# Patient Record
Sex: FEMALE | Race: WHITE | NOT HISPANIC OR LATINO | Employment: OTHER | URBAN - NONMETROPOLITAN AREA
[De-identification: names, ages, dates, MRNs, and addresses within clinical notes are randomized per-mention and may not be internally consistent; named-entity substitution may affect disease eponyms.]

---

## 2017-02-15 ENCOUNTER — OFFICE VISIT - GICH (OUTPATIENT)
Dept: FAMILY MEDICINE | Facility: OTHER | Age: 67
End: 2017-02-15

## 2017-02-15 ENCOUNTER — HISTORY (OUTPATIENT)
Dept: FAMILY MEDICINE | Facility: OTHER | Age: 67
End: 2017-02-15

## 2017-02-15 DIAGNOSIS — R09.81 NASAL CONGESTION: ICD-10-CM

## 2017-02-15 DIAGNOSIS — J20.9 ACUTE BRONCHITIS: ICD-10-CM

## 2018-01-03 NOTE — PROGRESS NOTES
"Patient Information     Patient Name MRN Sex Natalee Urban [id number] Female 1950      Progress Notes by Linda Pedroza NP at 2/15/2017  3:45 PM     Author:  Linda Pedroza NP Service:  (none) Author Type:  PHYS- Nurse Practitioner     Filed:  2/15/2017  8:08 PM Encounter Date:  2/15/2017 Status:  Signed     :  Linda Pedroza NP (PHYS- Nurse Practitioner)            HPI:    Natalee Montalvo is a 66 y.o. female who presents to clinic today for URI.  Cough x 2 weeks.  Chest tightness initially, loosened up after taking Mucinex.  Rarely short of breath with coughing spells/fits.  Nasal sinus congestion and drainage ongoing for 2 weeks.  Lots of nasal phlegm.   Sinus pressure with hard coughing.  No fevers.  No sore throat.  Appetite normal.  Energy normal.  States she normally goes to [facility] but she was unable to get into see her PCP until .  States she was treated about 2 months ago with Z jarrod which worked well.  No flu shot.  Current daily smoker, 0.5 ppd            No past medical history on file.  No past surgical history on file.  Social History        Substance Use Topics          Smoking status:   Current Every Day Smoker      Packs/day:  0.50      Types:  Cigarettes      Smokeless tobacco:   Never Used      Alcohol use   Yes      Comment: moderate       Current Outpatient Prescriptions       Medication  Sig Dispense Refill     atorvastatin (LIPITOR) 80 mg tablet        hydroCHLOROthiazide (HCTZ) 25 mg tablet        LORazepam (ATIVAN) 0.5 mg tab        No current facility-administered medications for this visit.      Medications have been reviewed by me and are current to the best of my knowledge and ability.    No Known Allergies    ROS:  Refer to HPI    Visit Vitals       /80     Pulse 80     Temp 99.6  F (37.6  C) (Tympanic)     Ht 1.61 m (5' 3.39\")     Wt 64.3 kg (141 lb 12.8 oz)     Breastfeeding No     BMI 24.81 kg/m2       EXAM:  General Appearance: Well " appearing adult female, appropriate appearance for age. No acute distress  Head: normocephalic, atraumatic  Ears: Left TM with bony landmarks appreciated, no erythema, no effusion, no bulging, no purulence.  Right TM with bony landmarks appreciated, no erythema, no effusion, no bulging, no purulence.   Left auditory canal clear.  Right auditory canal clear.  Normal external ears, non tender.  Eyes: conjunctivae normal, no drainage  Orophayrnx: moist mucous membranes, posterior pharynx without erythema, tonsils without hypertrophy, no erythema, no exudates or petechiae, no post nasal drip seen.    No sinus pain upon palpation of the frontal, maxillary, or ethmoid sinuses  Nose:  Bilateral nares with erythema, edema, and congestion   Neck: supple without adenopathy  Respiratory: normal chest wall and respirations.  Normal effort.  Clear to auscultation bilaterally, no wheezes or rhonchi or congestion, congested cough appreciated  Cardiac: RRR with no murmurs  Psychological: normal affect, alert and pleasant    ASSESSMENT/PLAN:    ICD-10-CM    1. Bronchitis, acute, with bronchospasm J20.9 azithromycin (ZITHROMAX Z-HOLLY) 250 mg tablet      predniSONE (DELTASONE) 20 mg tablet      albuterol HFA 90 mcg/actuation inhaler   2. Nasal sinus congestion R09.81 azithromycin (ZITHROMAX Z-HOLLY) 250 mg tablet         Azithromycin daily x 5 days (z holly dosing)  Prednisone 20 mg daily x 3 days, take with food  Albuterol inhaler 2 puffs Q 4 hours PRN  Mucinex PRN  Encouraged fluids  Symptomatic treatment - salt water gargles, honey, elevation, humidifier, sinus rinse/netti pot, lozenges, etc   Tylenol or ibuprofen PRN  Follow up if symptoms persist or worsen or concerns        Patient Instructions   Z holly - daily x 5 days     Prednisone daily x 3 days     Albuterol inhaler 2 puffs every 4 hours as needed      Encouraged fluids and rest.    May use symptomatic care with tylenol or ibuprofen.     Using a humidifier works well to break up  the congestion.     Elevate the mattress to 15 degrees in order to help with the congestion.    Frequent swallows of cool liquid.      Oatmeal or honey coats the throat and some patients find it soothes the pain.     Please call clinic with any questions or concerns.     Return to clinic with change/worsening of symptoms or concerns.

## 2018-01-03 NOTE — NURSING NOTE
Patient Information     Patient Name MRN Natalee Sims [id number] Female 1950      Nursing Note by Lynette Saba at 2/15/2017  3:45 PM     Author:  Lynette Saba Service:  (none) Author Type:  NURS- Student Practical Nurse     Filed:  2/15/2017  3:48 PM Encounter Date:  2/15/2017 Status:  Signed     :  Lynette Saba (NURS- Student Practical Nurse)            Patient presents with congestion, nasal discharge/yellow, cough. Patient uses honey. These symptoms have been 2 weeks. Two months ago was seen in [place] and given zpak. Patient will bring living will in.  Lynette Saba LPN .............2/15/2017  3:41 PM

## 2018-01-03 NOTE — PATIENT INSTRUCTIONS
Patient Information     Patient Name MRN Natalee Sims [id number] Female 1950      Patient Instructions by Linda Pedroza NP at 2/15/2017  3:45 PM     Author:  Linda Pedroza NP  Service:  (none) Author Type:  PHYS- Nurse Practitioner     Filed:  2/15/2017  8:07 PM  Encounter Date:  2/15/2017 Status:  Addendum     :  Linda Pedroza NP (PHYS- Nurse Practitioner)        Related Notes: Original Note by Linda Pedroza NP (PHYS- Nurse Practitioner) filed at 2/15/2017  4:06 PM            Z jarrod - daily x 5 days     Prednisone daily x 3 days     Albuterol inhaler 2 puffs every 4 hours as needed      Encouraged fluids and rest.    May use symptomatic care with tylenol or ibuprofen.     Using a humidifier works well to break up the congestion.     Elevate the mattress to 15 degrees in order to help with the congestion.    Frequent swallows of cool liquid.      Oatmeal or honey coats the throat and some patients find it soothes the pain.     Please call clinic with any questions or concerns.     Return to clinic with change/worsening of symptoms or concerns.

## 2018-01-26 VITALS
WEIGHT: 141.8 LBS | BODY MASS INDEX: 25.12 KG/M2 | HEIGHT: 63 IN | DIASTOLIC BLOOD PRESSURE: 80 MMHG | SYSTOLIC BLOOD PRESSURE: 118 MMHG | TEMPERATURE: 99.6 F | HEART RATE: 80 BPM

## 2018-03-09 ENCOUNTER — DOCUMENTATION ONLY (OUTPATIENT)
Dept: FAMILY MEDICINE | Facility: OTHER | Age: 68
End: 2018-03-09

## 2018-03-09 RX ORDER — LORAZEPAM 0.5 MG/1
TABLET ORAL
Status: ON HOLD | COMMUNITY
Start: 2016-12-01 | End: 2019-04-02

## 2018-03-09 RX ORDER — ALBUTEROL SULFATE 90 UG/1
2 AEROSOL, METERED RESPIRATORY (INHALATION) EVERY 4 HOURS PRN
Status: ON HOLD | COMMUNITY
Start: 2017-02-15 | End: 2024-01-18

## 2018-03-09 RX ORDER — HYDROCHLOROTHIAZIDE 25 MG/1
25 TABLET ORAL DAILY
Status: ON HOLD | COMMUNITY
Start: 2019-02-15 | End: 2019-04-02

## 2018-03-09 RX ORDER — ATORVASTATIN CALCIUM 80 MG/1
80 TABLET, FILM COATED ORAL DAILY
COMMUNITY
Start: 2017-01-28

## 2018-05-11 ENCOUNTER — TRANSFERRED RECORDS (OUTPATIENT)
Dept: HEALTH INFORMATION MANAGEMENT | Facility: OTHER | Age: 68
End: 2018-05-11

## 2019-04-01 ENCOUNTER — APPOINTMENT (OUTPATIENT)
Dept: CT IMAGING | Facility: OTHER | Age: 69
DRG: 871 | End: 2019-04-01
Attending: FAMILY MEDICINE
Payer: MEDICARE

## 2019-04-01 ENCOUNTER — HOSPITAL ENCOUNTER (INPATIENT)
Facility: OTHER | Age: 69
LOS: 5 days | Discharge: NURSING, REHAB OR LONG-TERM CARE | DRG: 871 | End: 2019-04-06
Attending: PHYSICIAN ASSISTANT | Admitting: INTERNAL MEDICINE
Payer: MEDICARE

## 2019-04-01 ENCOUNTER — APPOINTMENT (OUTPATIENT)
Dept: GENERAL RADIOLOGY | Facility: OTHER | Age: 69
DRG: 871 | End: 2019-04-01
Attending: FAMILY MEDICINE
Payer: MEDICARE

## 2019-04-01 DIAGNOSIS — E87.1 HYPOSMOLALITY SYNDROME: ICD-10-CM

## 2019-04-01 DIAGNOSIS — J44.1 CHRONIC OBSTRUCTIVE PULMONARY DISEASE WITH ACUTE EXACERBATION (H): Primary | ICD-10-CM

## 2019-04-01 DIAGNOSIS — F10.29 ALCOHOL DEPENDENCE WITH ALCOHOL-INDUCED DISORDER (H): ICD-10-CM

## 2019-04-01 DIAGNOSIS — E87.1 HYPONATREMIA: ICD-10-CM

## 2019-04-01 DIAGNOSIS — F17.210 CIGARETTE SMOKER: ICD-10-CM

## 2019-04-01 DIAGNOSIS — F10.29 ALCOHOL DEPENDENCE WITH UNSPECIFIED ALCOHOL-INDUCED DISORDER (H): ICD-10-CM

## 2019-04-01 DIAGNOSIS — E87.6 HYPOKALEMIA: ICD-10-CM

## 2019-04-01 DIAGNOSIS — E87.3 ALKALOSIS: ICD-10-CM

## 2019-04-01 DIAGNOSIS — E87.3 METABOLIC ALKALOSIS: ICD-10-CM

## 2019-04-01 DIAGNOSIS — E86.1 HYPOTENSION DUE TO HYPOVOLEMIA: ICD-10-CM

## 2019-04-01 DIAGNOSIS — E86.1 HYPOVOLEMIA: ICD-10-CM

## 2019-04-01 PROBLEM — R57.1 HYPOVOLEMIC SHOCK (H): Status: ACTIVE | Noted: 2019-04-01

## 2019-04-01 PROBLEM — S22.41XA CLOSED FRACTURE OF MULTIPLE RIBS OF RIGHT SIDE: Status: ACTIVE | Noted: 2019-04-01

## 2019-04-01 PROBLEM — R94.31 PROLONGED Q-T INTERVAL ON ECG: Status: ACTIVE | Noted: 2019-04-01

## 2019-04-01 LAB
ALBUMIN SERPL-MCNC: 3.5 G/DL (ref 3.5–5.7)
ALBUMIN UR-MCNC: NEGATIVE MG/DL
ALP SERPL-CCNC: 102 U/L (ref 34–104)
ALT SERPL W P-5'-P-CCNC: 35 U/L (ref 7–52)
ANION GAP SERPL CALCULATED.3IONS-SCNC: 21 MMOL/L (ref 3–14)
APPEARANCE UR: CLEAR
AST SERPL W P-5'-P-CCNC: 65 U/L (ref 13–39)
BASOPHILS # BLD AUTO: 0 10E9/L (ref 0–0.2)
BASOPHILS NFR BLD AUTO: 0.4 %
BILIRUB SERPL-MCNC: 0.6 MG/DL (ref 0.3–1)
BILIRUB UR QL STRIP: NEGATIVE
BUN SERPL-MCNC: 6 MG/DL (ref 7–25)
CALCIUM SERPL-MCNC: 8.6 MG/DL (ref 8.6–10.3)
CHLORIDE SERPL-SCNC: 80 MMOL/L (ref 98–107)
CK SERPL-CCNC: 234 U/L (ref 30–223)
CO2 SERPL-SCNC: 20 MMOL/L (ref 21–31)
COLOR UR AUTO: YELLOW
CREAT SERPL-MCNC: 0.56 MG/DL (ref 0.6–1.2)
D DIMER PPP DDU-MCNC: 2992 NG/ML D-DU (ref 0–230)
DIFFERENTIAL METHOD BLD: ABNORMAL
EOSINOPHIL # BLD AUTO: 0 10E9/L (ref 0–0.7)
EOSINOPHIL NFR BLD AUTO: 0.3 %
ERYTHROCYTE [DISTWIDTH] IN BLOOD BY AUTOMATED COUNT: 13.2 % (ref 10–15)
ETHANOL SERPL-MCNC: 0.04 %
GFR SERPL CREATININE-BSD FRML MDRD: >90 ML/MIN/{1.73_M2}
GLUCOSE SERPL-MCNC: 149 MG/DL (ref 70–105)
GLUCOSE UR STRIP-MCNC: NEGATIVE MG/DL
HBA1C MFR BLD: 5.4 % (ref 4–6)
HCO3 BLD-SCNC: 24 MMOL/L (ref 22–28)
HCT VFR BLD AUTO: 32.3 % (ref 35–47)
HGB BLD-MCNC: 11.9 G/DL (ref 11.7–15.7)
HGB UR QL STRIP: NEGATIVE
IMM GRANULOCYTES # BLD: 0.1 10E9/L (ref 0–0.4)
IMM GRANULOCYTES NFR BLD: 1 %
KETONES UR STRIP-MCNC: 15 MG/DL
LACTATE SERPL-SCNC: 1.1 MMOL/L (ref 0.5–2.2)
LEUKOCYTE ESTERASE UR QL STRIP: NEGATIVE
LYMPHOCYTES # BLD AUTO: 0.5 10E9/L (ref 0.8–5.3)
LYMPHOCYTES NFR BLD AUTO: 7 %
MAGNESIUM SERPL-MCNC: 1.3 MG/DL (ref 1.9–2.7)
MCH RBC QN AUTO: 35 PG (ref 26.5–33)
MCHC RBC AUTO-ENTMCNC: 36.8 G/DL (ref 31.5–36.5)
MCV RBC AUTO: 95 FL (ref 78–100)
MONOCYTES # BLD AUTO: 0.5 10E9/L (ref 0–1.3)
MONOCYTES NFR BLD AUTO: 8.1 %
NEUTROPHILS # BLD AUTO: 5.6 10E9/L (ref 1.6–8.3)
NEUTROPHILS NFR BLD AUTO: 83.2 %
NITRATE UR QL: NEGATIVE
NT-PROBNP SERPL-MCNC: 260 PG/ML (ref 0–100)
O2/TOTAL GAS SETTING VFR VENT: 0 %
OXYHGB MFR BLD: 90 %
PCO2 BLD: 33 MM HG (ref 35–45)
PH BLD: 7.47 PH (ref 7.35–7.45)
PH UR STRIP: 5.5 PH (ref 5–9)
PLATELET # BLD AUTO: 219 10E9/L (ref 150–450)
PO2 BLD: 73 MM HG (ref 83–108)
POTASSIUM SERPL-SCNC: 2.5 MMOL/L (ref 3.5–5.1)
POTASSIUM SERPL-SCNC: 2.7 MMOL/L (ref 3.5–5.1)
PROT SERPL-MCNC: 6.4 G/DL (ref 6.4–8.9)
RBC # BLD AUTO: 3.4 10E12/L (ref 3.8–5.2)
SODIUM SERPL-SCNC: 121 MMOL/L (ref 134–144)
SOURCE: ABNORMAL
SP GR UR STRIP: 1.01 (ref 1–1.03)
TROPONIN I SERPL-MCNC: <0.03 UG/L (ref 0–0.03)
TROPONIN I SERPL-MCNC: <0.03 UG/L (ref 0–0.03)
UROBILINOGEN UR STRIP-ACNC: 0.2 EU/DL (ref 0.2–1)
WBC # BLD AUTO: 6.7 10E9/L (ref 4–11)

## 2019-04-01 PROCEDURE — 84484 ASSAY OF TROPONIN QUANT: CPT | Performed by: FAMILY MEDICINE

## 2019-04-01 PROCEDURE — 71260 CT THORAX DX C+: CPT

## 2019-04-01 PROCEDURE — 72125 CT NECK SPINE W/O DYE: CPT

## 2019-04-01 PROCEDURE — 96365 THER/PROPH/DIAG IV INF INIT: CPT | Performed by: FAMILY MEDICINE

## 2019-04-01 PROCEDURE — 85379 FIBRIN DEGRADATION QUANT: CPT | Performed by: FAMILY MEDICINE

## 2019-04-01 PROCEDURE — 20000006 ZZH R&B ICU - GICH

## 2019-04-01 PROCEDURE — 25000132 ZZH RX MED GY IP 250 OP 250 PS 637: Mod: GY | Performed by: FAMILY MEDICINE

## 2019-04-01 PROCEDURE — 99223 1ST HOSP IP/OBS HIGH 75: CPT | Mod: AI | Performed by: INTERNAL MEDICINE

## 2019-04-01 PROCEDURE — 87040 BLOOD CULTURE FOR BACTERIA: CPT | Performed by: INTERNAL MEDICINE

## 2019-04-01 PROCEDURE — 84132 ASSAY OF SERUM POTASSIUM: CPT | Performed by: INTERNAL MEDICINE

## 2019-04-01 PROCEDURE — 83735 ASSAY OF MAGNESIUM: CPT | Performed by: FAMILY MEDICINE

## 2019-04-01 PROCEDURE — 25000128 H RX IP 250 OP 636: Performed by: FAMILY MEDICINE

## 2019-04-01 PROCEDURE — 25000132 ZZH RX MED GY IP 250 OP 250 PS 637: Mod: GY | Performed by: INTERNAL MEDICINE

## 2019-04-01 PROCEDURE — 83605 ASSAY OF LACTIC ACID: CPT | Performed by: FAMILY MEDICINE

## 2019-04-01 PROCEDURE — 99285 EMERGENCY DEPT VISIT HI MDM: CPT | Mod: Z6 | Performed by: FAMILY MEDICINE

## 2019-04-01 PROCEDURE — 80320 DRUG SCREEN QUANTALCOHOLS: CPT | Performed by: FAMILY MEDICINE

## 2019-04-01 PROCEDURE — 85025 COMPLETE CBC W/AUTO DIFF WBC: CPT | Performed by: FAMILY MEDICINE

## 2019-04-01 PROCEDURE — 84484 ASSAY OF TROPONIN QUANT: CPT | Performed by: INTERNAL MEDICINE

## 2019-04-01 PROCEDURE — 93010 ELECTROCARDIOGRAM REPORT: CPT | Performed by: INTERNAL MEDICINE

## 2019-04-01 PROCEDURE — 36600 WITHDRAWAL OF ARTERIAL BLOOD: CPT | Performed by: FAMILY MEDICINE

## 2019-04-01 PROCEDURE — 83880 ASSAY OF NATRIURETIC PEPTIDE: CPT | Performed by: FAMILY MEDICINE

## 2019-04-01 PROCEDURE — 83735 ASSAY OF MAGNESIUM: CPT | Performed by: INTERNAL MEDICINE

## 2019-04-01 PROCEDURE — 70450 CT HEAD/BRAIN W/O DYE: CPT

## 2019-04-01 PROCEDURE — 25800030 ZZH RX IP 258 OP 636: Performed by: FAMILY MEDICINE

## 2019-04-01 PROCEDURE — 25500064 ZZH RX 255 OP 636: Performed by: FAMILY MEDICINE

## 2019-04-01 PROCEDURE — 83036 HEMOGLOBIN GLYCOSYLATED A1C: CPT | Performed by: INTERNAL MEDICINE

## 2019-04-01 PROCEDURE — A9270 NON-COVERED ITEM OR SERVICE: HCPCS | Mod: GY | Performed by: FAMILY MEDICINE

## 2019-04-01 PROCEDURE — A9270 NON-COVERED ITEM OR SERVICE: HCPCS | Mod: GY | Performed by: INTERNAL MEDICINE

## 2019-04-01 PROCEDURE — 25000128 H RX IP 250 OP 636: Performed by: INTERNAL MEDICINE

## 2019-04-01 PROCEDURE — 93005 ELECTROCARDIOGRAM TRACING: CPT | Mod: 76 | Performed by: FAMILY MEDICINE

## 2019-04-01 PROCEDURE — 81003 URINALYSIS AUTO W/O SCOPE: CPT | Performed by: INTERNAL MEDICINE

## 2019-04-01 PROCEDURE — 99285 EMERGENCY DEPT VISIT HI MDM: CPT | Mod: 25 | Performed by: FAMILY MEDICINE

## 2019-04-01 PROCEDURE — 93005 ELECTROCARDIOGRAM TRACING: CPT | Performed by: FAMILY MEDICINE

## 2019-04-01 PROCEDURE — 96367 TX/PROPH/DG ADDL SEQ IV INF: CPT | Performed by: FAMILY MEDICINE

## 2019-04-01 PROCEDURE — 36415 COLL VENOUS BLD VENIPUNCTURE: CPT | Performed by: INTERNAL MEDICINE

## 2019-04-01 PROCEDURE — 96361 HYDRATE IV INFUSION ADD-ON: CPT | Performed by: FAMILY MEDICINE

## 2019-04-01 PROCEDURE — 82550 ASSAY OF CK (CPK): CPT | Performed by: INTERNAL MEDICINE

## 2019-04-01 PROCEDURE — 96366 THER/PROPH/DIAG IV INF ADDON: CPT | Performed by: FAMILY MEDICINE

## 2019-04-01 PROCEDURE — 36416 COLLJ CAPILLARY BLOOD SPEC: CPT | Performed by: FAMILY MEDICINE

## 2019-04-01 PROCEDURE — 80053 COMPREHEN METABOLIC PANEL: CPT | Performed by: FAMILY MEDICINE

## 2019-04-01 PROCEDURE — 82805 BLOOD GASES W/O2 SATURATION: CPT | Performed by: FAMILY MEDICINE

## 2019-04-01 PROCEDURE — 71045 X-RAY EXAM CHEST 1 VIEW: CPT

## 2019-04-01 RX ORDER — POTASSIUM CHLORIDE 7.45 MG/ML
10 INJECTION INTRAVENOUS CONTINUOUS
Status: DISCONTINUED | OUTPATIENT
Start: 2019-04-01 | End: 2019-04-06 | Stop reason: HOSPADM

## 2019-04-01 RX ORDER — PROCHLORPERAZINE 25 MG
12.5 SUPPOSITORY, RECTAL RECTAL EVERY 12 HOURS PRN
Status: DISCONTINUED | OUTPATIENT
Start: 2019-04-01 | End: 2019-04-06 | Stop reason: HOSPADM

## 2019-04-01 RX ORDER — SODIUM CHLORIDE 9 MG/ML
INJECTION, SOLUTION INTRAVENOUS CONTINUOUS
Status: DISCONTINUED | OUTPATIENT
Start: 2019-04-01 | End: 2019-04-03

## 2019-04-01 RX ORDER — ASPIRIN 81 MG/1
81 TABLET, CHEWABLE ORAL DAILY
Status: DISCONTINUED | OUTPATIENT
Start: 2019-04-02 | End: 2019-04-06 | Stop reason: HOSPADM

## 2019-04-01 RX ORDER — METOCLOPRAMIDE 5 MG/1
5 TABLET ORAL EVERY 6 HOURS PRN
Status: DISCONTINUED | OUTPATIENT
Start: 2019-04-01 | End: 2019-04-06 | Stop reason: HOSPADM

## 2019-04-01 RX ORDER — DOPAMINE HYDROCHLORIDE 160 MG/100ML
5-20 INJECTION, SOLUTION INTRAVENOUS CONTINUOUS
Status: DISCONTINUED | OUTPATIENT
Start: 2019-04-01 | End: 2019-04-02 | Stop reason: DRUGHIGH

## 2019-04-01 RX ORDER — POTASSIUM CHLORIDE 20MEQ/15ML
20 LIQUID (ML) ORAL ONCE
Status: COMPLETED | OUTPATIENT
Start: 2019-04-01 | End: 2019-04-01

## 2019-04-01 RX ORDER — METOCLOPRAMIDE HYDROCHLORIDE 5 MG/ML
5 INJECTION INTRAMUSCULAR; INTRAVENOUS EVERY 6 HOURS PRN
Status: DISCONTINUED | OUTPATIENT
Start: 2019-04-01 | End: 2019-04-06 | Stop reason: HOSPADM

## 2019-04-01 RX ORDER — CALCIUM CARBONATE 500 MG/1
1000 TABLET, CHEWABLE ORAL 4 TIMES DAILY PRN
Status: DISCONTINUED | OUTPATIENT
Start: 2019-04-01 | End: 2019-04-06 | Stop reason: HOSPADM

## 2019-04-01 RX ORDER — ONDANSETRON 2 MG/ML
4 INJECTION INTRAMUSCULAR; INTRAVENOUS EVERY 6 HOURS PRN
Status: DISCONTINUED | OUTPATIENT
Start: 2019-04-01 | End: 2019-04-06 | Stop reason: HOSPADM

## 2019-04-01 RX ORDER — AMOXICILLIN 250 MG
1 CAPSULE ORAL 2 TIMES DAILY PRN
Status: DISCONTINUED | OUTPATIENT
Start: 2019-04-01 | End: 2019-04-06 | Stop reason: HOSPADM

## 2019-04-01 RX ORDER — POTASSIUM CHLORIDE 7.45 MG/ML
10 INJECTION INTRAVENOUS
Status: DISCONTINUED | OUTPATIENT
Start: 2019-04-01 | End: 2019-04-06 | Stop reason: HOSPADM

## 2019-04-01 RX ORDER — SODIUM CHLORIDE 9 MG/ML
INJECTION, SOLUTION INTRAVENOUS CONTINUOUS
Status: DISCONTINUED | OUTPATIENT
Start: 2019-04-01 | End: 2019-04-02

## 2019-04-01 RX ORDER — AMOXICILLIN 250 MG
2 CAPSULE ORAL 2 TIMES DAILY PRN
Status: DISCONTINUED | OUTPATIENT
Start: 2019-04-01 | End: 2019-04-06 | Stop reason: HOSPADM

## 2019-04-01 RX ORDER — MORPHINE SULFATE 2 MG/ML
2 INJECTION, SOLUTION INTRAMUSCULAR; INTRAVENOUS
Status: DISCONTINUED | OUTPATIENT
Start: 2019-04-01 | End: 2019-04-06 | Stop reason: HOSPADM

## 2019-04-01 RX ORDER — POTASSIUM CHLORIDE 1500 MG/1
20-40 TABLET, EXTENDED RELEASE ORAL
Status: DISCONTINUED | OUTPATIENT
Start: 2019-04-01 | End: 2019-04-06 | Stop reason: HOSPADM

## 2019-04-01 RX ORDER — LIDOCAINE 40 MG/G
CREAM TOPICAL
Status: DISCONTINUED | OUTPATIENT
Start: 2019-04-01 | End: 2019-04-06 | Stop reason: HOSPADM

## 2019-04-01 RX ORDER — PROCHLORPERAZINE MALEATE 5 MG
5 TABLET ORAL EVERY 6 HOURS PRN
Status: DISCONTINUED | OUTPATIENT
Start: 2019-04-01 | End: 2019-04-06 | Stop reason: HOSPADM

## 2019-04-01 RX ORDER — ONDANSETRON 4 MG/1
4 TABLET, ORALLY DISINTEGRATING ORAL EVERY 6 HOURS PRN
Status: DISCONTINUED | OUTPATIENT
Start: 2019-04-01 | End: 2019-04-06 | Stop reason: HOSPADM

## 2019-04-01 RX ORDER — MAGNESIUM SULFATE HEPTAHYDRATE 40 MG/ML
2 INJECTION, SOLUTION INTRAVENOUS ONCE
Status: COMPLETED | OUTPATIENT
Start: 2019-04-01 | End: 2019-04-01

## 2019-04-01 RX ORDER — DEXTROSE MONOHYDRATE 25 G/50ML
25-50 INJECTION, SOLUTION INTRAVENOUS
Status: DISCONTINUED | OUTPATIENT
Start: 2019-04-01 | End: 2019-04-06 | Stop reason: HOSPADM

## 2019-04-01 RX ORDER — MAGNESIUM SULFATE HEPTAHYDRATE 40 MG/ML
4 INJECTION, SOLUTION INTRAVENOUS EVERY 4 HOURS PRN
Status: DISCONTINUED | OUTPATIENT
Start: 2019-04-01 | End: 2019-04-06 | Stop reason: HOSPADM

## 2019-04-01 RX ORDER — ATORVASTATIN CALCIUM 40 MG/1
80 TABLET, FILM COATED ORAL DAILY
Status: DISCONTINUED | OUTPATIENT
Start: 2019-04-02 | End: 2019-04-06 | Stop reason: HOSPADM

## 2019-04-01 RX ORDER — ACETAMINOPHEN 325 MG/1
650 TABLET ORAL EVERY 4 HOURS PRN
Status: DISCONTINUED | OUTPATIENT
Start: 2019-04-01 | End: 2019-04-06 | Stop reason: HOSPADM

## 2019-04-01 RX ORDER — MAGNESIUM CARB/ALUMINUM HYDROX 105-160MG
148 TABLET,CHEWABLE ORAL
Status: DISCONTINUED | OUTPATIENT
Start: 2019-04-01 | End: 2019-04-06 | Stop reason: HOSPADM

## 2019-04-01 RX ORDER — ACETAMINOPHEN 650 MG/1
650 SUPPOSITORY RECTAL EVERY 4 HOURS PRN
Status: DISCONTINUED | OUTPATIENT
Start: 2019-04-01 | End: 2019-04-06 | Stop reason: HOSPADM

## 2019-04-01 RX ORDER — NICOTINE POLACRILEX 4 MG
15-30 LOZENGE BUCCAL
Status: DISCONTINUED | OUTPATIENT
Start: 2019-04-01 | End: 2019-04-06 | Stop reason: HOSPADM

## 2019-04-01 RX ORDER — NALOXONE HYDROCHLORIDE 0.4 MG/ML
.1-.4 INJECTION, SOLUTION INTRAMUSCULAR; INTRAVENOUS; SUBCUTANEOUS
Status: DISCONTINUED | OUTPATIENT
Start: 2019-04-01 | End: 2019-04-06 | Stop reason: HOSPADM

## 2019-04-01 RX ORDER — POLYETHYLENE GLYCOL 3350 17 G/17G
17 POWDER, FOR SOLUTION ORAL DAILY PRN
Status: DISCONTINUED | OUTPATIENT
Start: 2019-04-01 | End: 2019-04-06 | Stop reason: HOSPADM

## 2019-04-01 RX ORDER — ASPIRIN 81 MG/1
81 TABLET, CHEWABLE ORAL DAILY
Status: ON HOLD | COMMUNITY
End: 2024-02-19

## 2019-04-01 RX ADMIN — SODIUM CHLORIDE 1000 ML: 9 INJECTION, SOLUTION INTRAVENOUS at 19:28

## 2019-04-01 RX ADMIN — IOHEXOL 100 ML: 350 INJECTION, SOLUTION INTRAVENOUS at 18:46

## 2019-04-01 RX ADMIN — DEXTROSE MONOHYDRATE AND SODIUM CHLORIDE 125 ML/HR: 5; .9 INJECTION, SOLUTION INTRAVENOUS at 18:19

## 2019-04-01 RX ADMIN — SODIUM CHLORIDE 500 ML: 9 INJECTION, SOLUTION INTRAVENOUS at 17:55

## 2019-04-01 RX ADMIN — SODIUM CHLORIDE 125 ML/HR: 9 INJECTION, SOLUTION INTRAVENOUS at 17:40

## 2019-04-01 RX ADMIN — DOPAMINE HYDROCHLORIDE IN DEXTROSE 5 MCG/KG/MIN: 1.6 INJECTION, SOLUTION INTRAVENOUS at 23:46

## 2019-04-01 RX ADMIN — ACETAMINOPHEN 650 MG: 325 TABLET, FILM COATED ORAL at 21:46

## 2019-04-01 RX ADMIN — MORPHINE SULFATE 2 MG: 2 INJECTION, SOLUTION INTRAMUSCULAR; INTRAVENOUS at 21:46

## 2019-04-01 RX ADMIN — SODIUM CHLORIDE 500 ML: 9 INJECTION, SOLUTION INTRAVENOUS at 20:39

## 2019-04-01 RX ADMIN — POTASSIUM CHLORIDE 20 MEQ: 40 SOLUTION ORAL at 19:23

## 2019-04-01 RX ADMIN — MAGNESIUM SULFATE HEPTAHYDRATE 2 G: 40 INJECTION, SOLUTION INTRAVENOUS at 20:39

## 2019-04-01 ASSESSMENT — ENCOUNTER SYMPTOMS
UNEXPECTED WEIGHT CHANGE: 0
ABDOMINAL PAIN: 0
PALPITATIONS: 0
PSYCHIATRIC NEGATIVE: 1
ROS SKIN COMMENTS: EASY BRUISABILITY
SHORTNESS OF BREATH: 1
CHEST TIGHTNESS: 0
SPEECH DIFFICULTY: 0
FATIGUE: 1
NECK PAIN: 0
DIAPHORESIS: 0
DIARRHEA: 0
ACTIVITY CHANGE: 1
ABDOMINAL DISTENTION: 0
CHILLS: 0
HEADACHES: 0
APPETITE CHANGE: 0
LIGHT-HEADEDNESS: 1
VOMITING: 0
FEVER: 0
HEMATOLOGIC/LYMPHATIC NEGATIVE: 1
MYALGIAS: 0
WHEEZING: 0
STRIDOR: 0
BACK PAIN: 0
WEAKNESS: 1
APNEA: 0
ALLERGIC/IMMUNOLOGIC NEGATIVE: 1
CONSTIPATION: 0
DIZZINESS: 0

## 2019-04-01 ASSESSMENT — ACTIVITIES OF DAILY LIVING (ADL)
BATHING: 0-->INDEPENDENT
AMBULATION: 0-->INDEPENDENT
COGNITION: 0 - NO COGNITION ISSUES REPORTED
TOILETING: 0-->INDEPENDENT
RETIRED_COMMUNICATION: 0-->UNDERSTANDS/COMMUNICATES WITHOUT DIFFICULTY
TRANSFERRING: 0-->INDEPENDENT
FALL_HISTORY_WITHIN_LAST_SIX_MONTHS: YES
NUMBER_OF_TIMES_PATIENT_HAS_FALLEN_WITHIN_LAST_SIX_MONTHS: 10
SWALLOWING: 0-->SWALLOWS FOODS/LIQUIDS WITHOUT DIFFICULTY
RETIRED_EATING: 0-->INDEPENDENT
DRESS: 0-->INDEPENDENT

## 2019-04-01 ASSESSMENT — MIFFLIN-ST. JEOR
SCORE: 1133.24
SCORE: 1209.13

## 2019-04-01 NOTE — ED TRIAGE NOTES
"Pt to ER from home after falling, sister called EMS after pt had been on floor for approx 15 mins having trouble getting up.  Pt c/o neck pain, c collar applied.  BG for EMS 60, 1 amp D25 given, BG up to 282.  BP 60 systolic on arrival.  Pt reported tired and weak for past weeks.  Pt c/o neck back and right sided rib pain on arrival.  Pt states she has \"been falling all the time\" for past few weeks d/t weakness, dizzy. C/o cough.  Multiple bruises t/o body.  "

## 2019-04-01 NOTE — ED NOTES
Patient has multiple bruises in different stages of healing all over body. Patient reporting frequent falls that correlate to the bruises. Large bruise on left upper back noted upon skin assessment.

## 2019-04-01 NOTE — ED PROVIDER NOTES
History     Chief Complaint   Patient presents with     Fall     Trauma     HPI  Natalee Montalvo is a 68 year old female who  Presents via EMS for fall about two hours ago.Sister called EMS after she was unable to get patient up 15 minutes.  When EMS arrived it was noted that she had SBP of 60 and BS of 60. She was given 1 am of D25 and repeat upon arrival here was 282//. Initial BP was 92 here.  She notes multiple falls last week and chart review shows that she called in because she was concerned that her last BP med was causing the falls.  She complained of neck pain today and collar was applied prior to arrival at the ER. Complains of right rib pain as well. HAs been having difficulty coughing. She did take her meds today including Plavix, Toprol, hydrochlorothiazide  And atorvastatin.      She has a PMH of CAD with 3V CABG in 2013 (SHERRELL-LAD, RSVG-OM1 and RSVG-RCA), anxiety and depression, history of alcohol use disorder, COPD, HTN, and HLD. Also recent admit for NSTEMI to [facility] and they noted severe left subclavian stenosis requiring bypass for the left CCA to the left subclavian artery 1/22/19. She was discharged from [facility] with plans for cardiac rehab which she did not get. She continues to smoke as well and admits to drinking 4 shots of Rum daily. Patient admitted to alcohol intake today as well stating she only had one drinke    Allergies:  Allergies   Allergen Reactions     Augmentin      Other reaction(s): GI intolerance  Stomach cramps     Bupropion      Other reaction(s): Other  Insomnia and tension     Oxycodone      nausea     Pseudoephedrine      Other reaction(s): Increased blood pressure, Tachycardia  Advise not to use this medication which was in her allergy product.     Nicotine Rash     Sertraline Rash       Problem List:    Patient Active Problem List    Diagnosis Date Noted     Hypovolemic shock (H) 04/01/2019     Priority: Medium     CAD (coronary artery disease) 04/01/2019     Priority:  Medium     COPD (chronic obstructive pulmonary disease) (H) 04/01/2019     Priority: Medium     Alcoholism (H) 04/01/2019     Priority: Medium     Hypokalemia 04/01/2019     Priority: Medium     Hyponatremia 04/01/2019     Priority: Medium     Closed fracture of multiple ribs of right side 04/01/2019     Priority: Medium     Prolonged Q-T interval on ECG 04/01/2019     Priority: Medium        Past Medical History:    Past Medical History:   Diagnosis Date     Alcoholism (H) 4/1/2019     CAD (coronary artery disease) 4/1/2019     COPD (chronic obstructive pulmonary disease) (H) 4/1/2019       Past Surgical History:    History reviewed. No pertinent surgical history.    Family History:    History reviewed. No pertinent family history.    Social History:  Marital Status:   [5]  Social History     Tobacco Use     Smoking status: Current Every Day Smoker     Packs/day: 0.50     Types: Cigarettes     Smokeless tobacco: Never Used   Substance Use Topics     Alcohol use: Yes     Comment: daily     Drug use: No     Comment: Drug use: No        Medications:      albuterol (PROAIR HFA/PROVENTIL HFA/VENTOLIN HFA) 108 (90 BASE) MCG/ACT Inhaler   aspirin (ASA) 81 MG chewable tablet   atorvastatin (LIPITOR) 80 MG tablet   hydrochlorothiazide (HYDRODIURIL) 25 MG tablet   LORazepam (ATIVAN) 0.5 MG tablet         Review of Systems   Constitutional: Positive for activity change and fatigue. Negative for appetite change, chills, diaphoresis, fever and unexpected weight change.   HENT: Negative.    Respiratory: Positive for shortness of breath. Negative for apnea, chest tightness, wheezing and stridor.    Cardiovascular: Positive for leg swelling. Negative for chest pain and palpitations.   Gastrointestinal: Negative for abdominal distention, abdominal pain, constipation, diarrhea and vomiting.   Genitourinary: Negative.    Musculoskeletal: Negative for back pain, myalgias and neck pain.   Skin:        Easy bruisability  "  Allergic/Immunologic: Negative.    Neurological: Positive for weakness and light-headedness. Negative for dizziness, speech difficulty and headaches.   Hematological: Negative.    Psychiatric/Behavioral: Negative.        Physical Exam   BP: 97/50  Pulse: 73  Heart Rate: 81  Temp: 98.4  F (36.9  C)  Resp: 22  Height: 160 cm (5' 3\")  Weight: 64.4 kg (142 lb)  SpO2: 94 %      Physical Exam   Constitutional: She is oriented to person, place, and time. She appears well-developed and well-nourished. No distress.   HENT:   Head: Normocephalic.   Right Ear: External ear normal.   Left Ear: External ear normal.   Nose: Nose normal.   Mouth/Throat: Oropharynx is clear and moist.   Multiple bruises   Eyes: Conjunctivae and EOM are normal. Pupils are equal, round, and reactive to light. Right eye exhibits no discharge. Left eye exhibits no discharge.   Neck: Normal range of motion. Neck supple. No thyromegaly present.   Cardiovascular: Normal rate, regular rhythm and normal heart sounds.   Pulmonary/Chest: Effort normal and breath sounds normal. No stridor. No respiratory distress. She has no wheezes.   Chest tenderness on right.    Abdominal: Soft. Bowel sounds are normal. She exhibits no distension. There is no tenderness. There is no guarding.   Musculoskeletal: Normal range of motion. She exhibits no edema, tenderness or deformity.   Lymphadenopathy:     She has no cervical adenopathy.   Neurological: She is alert and oriented to person, place, and time.   Skin: Skin is warm and dry. Capillary refill takes less than 2 seconds. She is not diaphoretic.   Psychiatric: She has a normal mood and affect. Her behavior is normal. Judgment and thought content normal.   Nursing note and vitals reviewed.      ED Course   Patient seen and examined. Labs and EKG ordered. CXR ordered.     Labs reviewed as they came in, but were incidentally deleted from the ED course. Patient's sodium noted to be 121 potassium 2.5 with an anion gap of " 21, creatinine 0.56 pH was 7.47 with a PCO2 of 33 and a PO2 of 73.  O2 sats are 90%.    Discussed results with patient she has been more short of breath with any kind of activity as well as lightheadedness dizziness.  She has had multiple falls with bruising all over her body but also states that she has been bruising very easily with minimal contact.    D-dimer came back elevated at 2992.  CT PE chest ordered.  Ethanol is also elevated at 0.04 and BNP is mildly elevated at 260.  Troponin was negative and lactate was also negative.    CT PE study is negative. Discussed with Dr Saldivar=- She does have 4 broken ribs, but no [pneumothorax noted. She does have a cough that is congested. Potassium - is low and she has been given one dose of potassium IV and now that she is more awake will give her oral potassium as well. Second troponin is ordered. Second EKG done as well.     Second EKG has improved but QTC is more prolonged. BP has dropped again. Dr Saldivar has evaluated. MAP has dropped to 58 - will start Dopamine for pressor support.     7:37 PM Phone call to [facility].Discussed with Dr Weathers. Patient's pressure is finally coming back up into the systolic of 90's/ Not on pressors yet. Dr Saldivar is comfortable with admission here now that pressures are coming up. Patient is on her third liter of normal saline at present and has received 30cc/kg of fluids. She is acting more normally. BP's were taken with patient lying flat, had previously been sitting up. Repeat BP in right arm is the same as the left. Patient had subclavian stenosis and repair on the right.   Procedures               EKG Interpretation:      Interpreted by Isis Gonzalez  Time reviewed: 17:00   Symptoms at time of EKG: lightheadedness and SOB   Rhythm: normal sinus  and sinus arrhythmia  Rate: Normal  Axis: Normal  Ectopy: none  Conduction: normal  ST Segments/ T Waves: No acute ischemic changes, ST Segment Depression III, T wave inversion  V1 and V2 and QTc prolongation 565 versus 464 <arch 2019  Q Waves: none  Comparison to prior: unable to directly compare but QT is prolonged per report.    Clinical Impression: non-specific EKG and prolonged QT interval         EKG Interpretation:    #2  Interpreted by Isis Gonzalez  Time reviewed:19:25  Symptoms at time of EKG: hypotension   Rhythm: Normal sinus/sinus arrhythmia   Rate: Normal  Axis: Normal  Ectopy: None  Conduction: Normal  ST Segments/ T Waves: Non-specific ST-T wave changes and QTc prolongation -574  Q Waves: None  Comparison to prior: improved ST depression and T wave inversions. QTC is longer.     Clinical Impression: non-specific EKG, hypokalemia and prolonged QT interval    Results for orders placed or performed during the hospital encounter of 04/01/19 (from the past 24 hour(s))   CBC with platelets differential   Result Value Ref Range    WBC 6.7 4.0 - 11.0 10e9/L    RBC Count 3.40 (L) 3.8 - 5.2 10e12/L    Hemoglobin 11.9 11.7 - 15.7 g/dL    Hematocrit 32.3 (L) 35.0 - 47.0 %    MCV 95 78 - 100 fl    MCH 35.0 (H) 26.5 - 33.0 pg    MCHC 36.8 (H) 31.5 - 36.5 g/dL    RDW 13.2 10.0 - 15.0 %    Platelet Count 219 150 - 450 10e9/L    Diff Method Automated Method     % Neutrophils 83.2 %    % Lymphocytes 7.0 %    % Monocytes 8.1 %    % Eosinophils 0.3 %    % Basophils 0.4 %    % Immature Granulocytes 1.0 %    Absolute Neutrophil 5.6 1.6 - 8.3 10e9/L    Absolute Lymphocytes 0.5 (L) 0.8 - 5.3 10e9/L    Absolute Monocytes 0.5 0.0 - 1.3 10e9/L    Absolute Eosinophils 0.0 0.0 - 0.7 10e9/L    Absolute Basophils 0.0 0.0 - 0.2 10e9/L    Abs Immature Granulocytes 0.1 0 - 0.4 10e9/L   Comprehensive metabolic panel   Result Value Ref Range    Sodium 121 (L) 134 - 144 mmol/L    Potassium 2.5 (L) 3.5 - 5.1 mmol/L    Chloride 80 (L) 98 - 107 mmol/L    Carbon Dioxide 20 (L) 21 - 31 mmol/L    Anion Gap 21 (H) 3 - 14 mmol/L    Glucose 149 (H) 70 - 105 mg/dL    Urea Nitrogen 6 (L) 7 - 25 mg/dL    Creatinine 0.56  (L) 0.60 - 1.20 mg/dL    GFR Estimate >90 >60 mL/min/[1.73_m2]    GFR Estimate If Black >90 >60 mL/min/[1.73_m2]    Calcium 8.6 8.6 - 10.3 mg/dL    Bilirubin Total 0.6 0.3 - 1.0 mg/dL    Albumin 3.5 3.5 - 5.7 g/dL    Protein Total 6.4 6.4 - 8.9 g/dL    Alkaline Phosphatase 102 34 - 104 U/L    ALT 35 7 - 52 U/L    AST 65 (H) 13 - 39 U/L   NT pro BNP   Result Value Ref Range    N-Terminal Pro BNP Inpatient 260 (H) 0 - 100 pg/mL   Troponin I   Result Value Ref Range    Troponin I ES <0.030 0.000 - 0.034 ug/L   Ethanol GH   Result Value Ref Range    Ethanol g/dL 0.04 (H) <0.01 %   XR Chest Port 1 View    Narrative    PROCEDURE:  XR CHEST PORT 1 VW    HISTORY: chest tightness -recent NSTEMI. .    COMPARISON:  6/5/07.    FINDINGS:    The cardiomediastinal contours are within normal limits. There is  calcific aortic atherosclerosis. Changes of prior median sternotomy  are seen.  No focal consolidation, effusion or pneumothorax.  Age indeterminant right mid posterior rib fractures are noted.      Impression    IMPRESSION:  Age-indeterminate right rib fractures.      SEEMA ALEXNADRE MD   CT Head w/o Contrast    Narrative    PROCEDURE: CT HEAD W/O CONTRAST     HISTORY: Head trauma, minor, pt on anticoagulation.    COMPARISON: None.    TECHNIQUE:  Helical images of the head from the foramen magnum to the  vertex were obtained without contrast.    FINDINGS: The ventricles and sulci are prominent, compatible with  mild, generalized volume loss. No acute intracranial hemorrhage, mass  effect, midline shift, hydrocephalus or basilar cystern effacement are  present.    The grey-white matter interface is preserved.     The calvarium is intact. An air-fluid level is present in the left  maxillary sinus, which can be seen in acute on chronic sinusitis.      Impression    IMPRESSION: No acute intracranial hemorrhage.      SEEMA ALEXANDRE MD   CT Cervical Spine w/o Contrast    Narrative    PROCEDURE: CT CERVICAL SPINE W/O  CONTRAST     HISTORY: C-spine trauma, ligamentous injury suspected.    TECHNIQUE: Helical noncontrast CT images of the cervical spine.    COMPARISON: None.    FINDINGS:     No acute fracture is identified. The vertebral bodies are normal in  height. The cervical lordosis is straightened. The C1-2 articulation  and the craniocervical junction are intact.     Scattered senescent changes are present. Vascular calcifications are  noted at the carotid bifurcations. Bulky calcific change within the  proximal left subclavian may reflect an occluded stent. Correlate for  evidence of subclavian steal.    Emphysematous changes are present at the lung apices.       Impression    IMPRESSION: No evidence of acute cervical spine fracture.    SEEMA ALEXANDRE MD   D-Dimer (HI,GH)   Result Value Ref Range    D-Dimer ng/mL 2,992 (H) 0 - 230 ng/ml D-DU   Blood gas arterial and oxyhgb   Result Value Ref Range    pH Arterial 7.47 (H) 7.35 - 7.45 pH    pCO2 Arterial 33 (L) 35 - 45 mm Hg    pO2 Arterial 73 (L) 83 - 108 mm Hg    Bicarbonate Arterial 24 22 - 28 mmol/L    FIO2 0     Oxyhemoglobin Arterial 90 (L) >95 %   Lactic acid   Result Value Ref Range    Lactic Acid 1.1 0.5 - 2.2 mmol/L   CT Chest Pulmonary Embolism w Contrast    Narrative    PROCEDURE:  CT CHEST PULMONARY EMBOLISM W CONTRAST.    HISTORY:  Evaluate for pulmonary embolism.  PE suspected, high pretest  prob    TECHNIQUE:  Initial pre-contrast  and localizer images were  obtained.  Contrast enhanced helical CT pulmonary angiography was then  performed.  Routine transaxial and post-processed (multiplanar and/or  MIP) reformations were obtained.    COMPARISON:  Radiograph earlier today.    MEDS/CONTRAST: 100 mL omnipaque 350    PULMONARY CTA FINDINGS:  This is a diagnostic quality helical CT  pulmonary angiogram.  There is no acute pulmonary embolism within the  limitations of mild respiratory motion.    OTHER FINDINGS:      The heart is borderline enlarged.  Coronary calcifications are present.  There is no pericardial thickening or effusions. There is no  mediastinal, hilar, or axillary lymphadenopathy. The thoracic aorta  and main pulmonary artery are within normal limits in size.     Limited views of the upper abdomen reveal no adrenal mass or acute  process. Increased colonic submucosal fat is present.    The pleura is without thickening or effusions. The central airways are  unremarkable. Dependent atelectasis is present.    A chronic right fifth for rib fracture is present. Acute right sixth,  seventh, eighth and ninth rib fractures are present.      Impression    IMPRESSION:    No acute pulmonary emboli.    Acute right sixth, seventh, eighth and ninth rib fractures are  present. Probable atelectasis at the bases from splinting. No  pneumothorax.    SEEMA ALEXANDRE MD   Troponin I   Result Value Ref Range    Troponin I ES <0.030 0.000 - 0.034 ug/L   Magnesium   Result Value Ref Range    Magnesium 1.3 (L) 1.9 - 2.7 mg/dL       Medications   0.9% sodium chloride BOLUS (0 mLs Intravenous Stopped 4/1/19 1740)   sodium chloride 0.9% infusion ( Intravenous Stopped 4/1/19 1819)   potassium chloride 10 mEq in 100 mL sterile water intermittent infusion (premix) ( Intravenous Canceled Entry 4/1/19 1917)   dextrose 5% and 0.9% NaCl infusion (125 mL/hr Intravenous New Bag 4/1/19 1819)   0.9% sodium chloride BOLUS (1,000 mLs Intravenous New Bag 4/1/19 1928)   DOPamine 400 mg in dextrose 5% 250 mL (adult std) - premix (0 mcg/kg/min × 64.4 kg Intravenous Hold 4/1/19 2004)   0.9% sodium chloride BOLUS (0 mLs Intravenous Stopped 4/1/19 1915)   iohexol (OMNIPAQUE) 350 mg/mL solution 100 mL (100 mLs Intravenous Given 4/1/19 1846)   potassium chloride (KAYCIEL) solution 20 mEq (20 mEq Oral Given 4/1/19 1923)       Assessments & Plan (with Medical Decision Making)     I have reviewed the nursing notes.    I have reviewed the findings, diagnosis, plan and need for follow up with  the patient.  8:04 PM  IF her troponin bumps significantly- will consider transfer at that time. Dr Saldivar is comfortable with this plan. Admit ICU.        Medication List      There are no discharge medications for this visit.         Final diagnoses:   Hypotension due to hypovolemia   Alcohol dependence with unspecified alcohol-induced disorder (H)   Hyponatremia   Hypokalemia   Metabolic alkalosis       4/1/2019   [facility] ortega MD  04/01/19 2013

## 2019-04-01 NOTE — ED NOTES
C collar off.  Pt remains lethargic, but opens eyes and speaks appropriately with verbal prompt.  Sister remains at bedside.

## 2019-04-01 NOTE — PROGRESS NOTES
1.  Has the patient had a previous reaction to IV contrast? No      2.  Does the patient have kidney disease? No    3.  Is the patient on dialysis? no    If YES to any of these questions, exam will be reviewed with a Radiologist before administering contrast.

## 2019-04-01 NOTE — ED NOTES
"Sister at bedside, states pt was normal when she found her on floor, just seemed really \"tired\".  "

## 2019-04-02 ENCOUNTER — APPOINTMENT (OUTPATIENT)
Dept: CARDIOLOGY | Facility: OTHER | Age: 69
DRG: 871 | End: 2019-04-02
Attending: INTERNAL MEDICINE
Payer: MEDICARE

## 2019-04-02 LAB
ALBUMIN SERPL-MCNC: 3.3 G/DL (ref 3.5–5.7)
ALP SERPL-CCNC: 90 U/L (ref 34–104)
ALT SERPL W P-5'-P-CCNC: 30 U/L (ref 7–52)
ANION GAP SERPL CALCULATED.3IONS-SCNC: 7 MMOL/L (ref 3–14)
AST SERPL W P-5'-P-CCNC: 50 U/L (ref 13–39)
BILIRUB SERPL-MCNC: 0.5 MG/DL (ref 0.3–1)
BUN SERPL-MCNC: 5 MG/DL (ref 7–25)
CALCIUM SERPL-MCNC: 7.6 MG/DL (ref 8.6–10.3)
CHLORIDE SERPL-SCNC: 93 MMOL/L (ref 98–107)
CO2 SERPL-SCNC: 29 MMOL/L (ref 21–31)
CREAT SERPL-MCNC: 0.51 MG/DL (ref 0.6–1.2)
ERYTHROCYTE [DISTWIDTH] IN BLOOD BY AUTOMATED COUNT: 13.6 % (ref 10–15)
GFR SERPL CREATININE-BSD FRML MDRD: >90 ML/MIN/{1.73_M2}
GLUCOSE SERPL-MCNC: 202 MG/DL (ref 70–105)
HCT VFR BLD AUTO: 30.3 % (ref 35–47)
HGB BLD-MCNC: 10.5 G/DL (ref 11.7–15.7)
MAGNESIUM SERPL-MCNC: 1.8 MG/DL (ref 1.9–2.7)
MCH RBC QN AUTO: 34.3 PG (ref 26.5–33)
MCHC RBC AUTO-ENTMCNC: 34.7 G/DL (ref 31.5–36.5)
MCV RBC AUTO: 99 FL (ref 78–100)
PLATELET # BLD AUTO: 177 10E9/L (ref 150–450)
POTASSIUM SERPL-SCNC: 2.5 MMOL/L (ref 3.5–5.1)
POTASSIUM SERPL-SCNC: 3.4 MMOL/L (ref 3.5–5.1)
PROCALCITONIN SERPL-MCNC: 1.2 NG/ML
PROT SERPL-MCNC: 5.5 G/DL (ref 6.4–8.9)
RBC # BLD AUTO: 3.06 10E12/L (ref 3.8–5.2)
SODIUM SERPL-SCNC: 129 MMOL/L (ref 134–144)
TROPONIN I SERPL-MCNC: 0.04 UG/L (ref 0–0.03)
WBC # BLD AUTO: 5.7 10E9/L (ref 4–11)

## 2019-04-02 PROCEDURE — 40000264 ECHOCARDIOGRAM COMPLETE

## 2019-04-02 PROCEDURE — 25000131 ZZH RX MED GY IP 250 OP 636 PS 637: Mod: GY | Performed by: INTERNAL MEDICINE

## 2019-04-02 PROCEDURE — 93306 TTE W/DOPPLER COMPLETE: CPT | Mod: 26 | Performed by: INTERNAL MEDICINE

## 2019-04-02 PROCEDURE — 25000128 H RX IP 250 OP 636: Performed by: INTERNAL MEDICINE

## 2019-04-02 PROCEDURE — 80053 COMPREHEN METABOLIC PANEL: CPT | Performed by: INTERNAL MEDICINE

## 2019-04-02 PROCEDURE — A9270 NON-COVERED ITEM OR SERVICE: HCPCS | Mod: GY | Performed by: FAMILY MEDICINE

## 2019-04-02 PROCEDURE — 36415 COLL VENOUS BLD VENIPUNCTURE: CPT | Performed by: INTERNAL MEDICINE

## 2019-04-02 PROCEDURE — 25500064 ZZH RX 255 OP 636: Performed by: INTERNAL MEDICINE

## 2019-04-02 PROCEDURE — 84132 ASSAY OF SERUM POTASSIUM: CPT | Performed by: INTERNAL MEDICINE

## 2019-04-02 PROCEDURE — 20000006 ZZH R&B ICU - GICH

## 2019-04-02 PROCEDURE — 99233 SBSQ HOSP IP/OBS HIGH 50: CPT | Performed by: INTERNAL MEDICINE

## 2019-04-02 PROCEDURE — 84484 ASSAY OF TROPONIN QUANT: CPT | Performed by: INTERNAL MEDICINE

## 2019-04-02 PROCEDURE — 25000132 ZZH RX MED GY IP 250 OP 250 PS 637: Mod: GY | Performed by: FAMILY MEDICINE

## 2019-04-02 PROCEDURE — 25800030 ZZH RX IP 258 OP 636: Performed by: FAMILY MEDICINE

## 2019-04-02 PROCEDURE — 85027 COMPLETE CBC AUTOMATED: CPT | Performed by: INTERNAL MEDICINE

## 2019-04-02 PROCEDURE — 25800030 ZZH RX IP 258 OP 636: Performed by: INTERNAL MEDICINE

## 2019-04-02 PROCEDURE — A9270 NON-COVERED ITEM OR SERVICE: HCPCS | Mod: GY | Performed by: INTERNAL MEDICINE

## 2019-04-02 PROCEDURE — 25000132 ZZH RX MED GY IP 250 OP 250 PS 637: Mod: GY | Performed by: INTERNAL MEDICINE

## 2019-04-02 PROCEDURE — 84145 PROCALCITONIN (PCT): CPT | Performed by: INTERNAL MEDICINE

## 2019-04-02 RX ORDER — METOPROLOL SUCCINATE 25 MG/1
25 TABLET, EXTENDED RELEASE ORAL DAILY
COMMUNITY

## 2019-04-02 RX ORDER — FOLIC ACID 1 MG/1
1 TABLET ORAL DAILY
Status: DISCONTINUED | OUTPATIENT
Start: 2019-04-02 | End: 2019-04-06 | Stop reason: HOSPADM

## 2019-04-02 RX ORDER — IPRATROPIUM BROMIDE AND ALBUTEROL SULFATE 2.5; .5 MG/3ML; MG/3ML
1 SOLUTION RESPIRATORY (INHALATION) EVERY 4 HOURS PRN
Status: ON HOLD | COMMUNITY
End: 2024-01-19

## 2019-04-02 RX ORDER — MULTIVITAMIN,THERAPEUTIC
1 TABLET ORAL DAILY
Status: ON HOLD | COMMUNITY
End: 2024-01-18

## 2019-04-02 RX ORDER — CLOPIDOGREL BISULFATE 75 MG/1
75 TABLET ORAL DAILY
Status: DISCONTINUED | OUTPATIENT
Start: 2019-04-02 | End: 2019-04-06 | Stop reason: HOSPADM

## 2019-04-02 RX ORDER — LORAZEPAM 2 MG/ML
.5-1 INJECTION INTRAMUSCULAR EVERY 4 HOURS PRN
Status: DISCONTINUED | OUTPATIENT
Start: 2019-04-02 | End: 2019-04-06 | Stop reason: HOSPADM

## 2019-04-02 RX ORDER — KETOROLAC TROMETHAMINE 30 MG/ML
30 INJECTION, SOLUTION INTRAMUSCULAR; INTRAVENOUS EVERY 6 HOURS PRN
Status: DISCONTINUED | OUTPATIENT
Start: 2019-04-02 | End: 2019-04-04

## 2019-04-02 RX ORDER — SILVER SULFADIAZINE 10 MG/G
CREAM TOPICAL PRN
Status: ON HOLD | COMMUNITY
End: 2024-01-18

## 2019-04-02 RX ORDER — MULTIPLE VITAMINS W/ MINERALS TAB 9MG-400MCG
1 TAB ORAL DAILY
Status: DISCONTINUED | OUTPATIENT
Start: 2019-04-02 | End: 2019-04-06 | Stop reason: HOSPADM

## 2019-04-02 RX ORDER — LISINOPRIL 10 MG/1
10 TABLET ORAL DAILY
COMMUNITY
Start: 2019-03-20

## 2019-04-02 RX ORDER — FLUTICASONE PROPIONATE 50 MCG
2 SPRAY, SUSPENSION (ML) NASAL DAILY
Status: ON HOLD | COMMUNITY
End: 2024-01-18

## 2019-04-02 RX ORDER — BUDESONIDE AND FORMOTEROL FUMARATE DIHYDRATE 160; 4.5 UG/1; UG/1
2 AEROSOL RESPIRATORY (INHALATION) 2 TIMES DAILY
Status: ON HOLD | COMMUNITY
End: 2024-01-18

## 2019-04-02 RX ORDER — DOPAMINE HYDROCHLORIDE 160 MG/100ML
2-20 INJECTION, SOLUTION INTRAVENOUS CONTINUOUS
Status: DISCONTINUED | OUTPATIENT
Start: 2019-04-02 | End: 2019-04-03

## 2019-04-02 RX ORDER — FUROSEMIDE 20 MG
20 TABLET ORAL DAILY
Status: ON HOLD | COMMUNITY
Start: 2019-03-20 | End: 2024-01-18

## 2019-04-02 RX ORDER — LORAZEPAM 1 MG/1
1-2 TABLET ORAL EVERY 30 MIN PRN
Status: DISCONTINUED | OUTPATIENT
Start: 2019-04-02 | End: 2019-04-06 | Stop reason: HOSPADM

## 2019-04-02 RX ORDER — LORAZEPAM 2 MG/ML
1-2 INJECTION INTRAMUSCULAR EVERY 30 MIN PRN
Status: DISCONTINUED | OUTPATIENT
Start: 2019-04-02 | End: 2019-04-06 | Stop reason: HOSPADM

## 2019-04-02 RX ORDER — LANOLIN ALCOHOL/MO/W.PET/CERES
100 CREAM (GRAM) TOPICAL DAILY
Status: COMPLETED | OUTPATIENT
Start: 2019-04-02 | End: 2019-04-06

## 2019-04-02 RX ORDER — NITROGLYCERIN 0.4 MG/1
0.4 TABLET SUBLINGUAL EVERY 5 MIN PRN
COMMUNITY

## 2019-04-02 RX ORDER — CLOPIDOGREL BISULFATE 75 MG/1
75 TABLET ORAL DAILY
COMMUNITY
Start: 2019-01-25 | End: 2020-01-25

## 2019-04-02 RX ADMIN — POTASSIUM CHLORIDE 10 MEQ: 10 INJECTION, SOLUTION INTRAVENOUS at 13:14

## 2019-04-02 RX ADMIN — THIAMINE HCL TAB 100 MG 100 MG: 100 TAB at 21:31

## 2019-04-02 RX ADMIN — LORAZEPAM 1 MG: 2 INJECTION INTRAMUSCULAR; INTRAVENOUS at 23:44

## 2019-04-02 RX ADMIN — ATORVASTATIN CALCIUM 80 MG: 40 TABLET, FILM COATED ORAL at 10:04

## 2019-04-02 RX ADMIN — TAZOBACTAM SODIUM AND PIPERACILLIN SODIUM 3.38 G: 375; 3 INJECTION, SOLUTION INTRAVENOUS at 10:03

## 2019-04-02 RX ADMIN — LORAZEPAM 1 MG: 2 INJECTION INTRAMUSCULAR; INTRAVENOUS at 19:28

## 2019-04-02 RX ADMIN — MORPHINE SULFATE 2 MG: 2 INJECTION, SOLUTION INTRAMUSCULAR; INTRAVENOUS at 23:44

## 2019-04-02 RX ADMIN — MORPHINE SULFATE 2 MG: 2 INJECTION, SOLUTION INTRAMUSCULAR; INTRAVENOUS at 08:24

## 2019-04-02 RX ADMIN — FOLIC ACID 1 MG: 1 TABLET ORAL at 21:30

## 2019-04-02 RX ADMIN — TAZOBACTAM SODIUM AND PIPERACILLIN SODIUM 3.38 G: 375; 3 INJECTION, SOLUTION INTRAVENOUS at 16:54

## 2019-04-02 RX ADMIN — KETOROLAC TROMETHAMINE 30 MG: 30 INJECTION, SOLUTION INTRAMUSCULAR; INTRAVENOUS at 06:06

## 2019-04-02 RX ADMIN — KETOROLAC TROMETHAMINE 30 MG: 30 INJECTION, SOLUTION INTRAMUSCULAR; INTRAVENOUS at 18:14

## 2019-04-02 RX ADMIN — ASPIRIN 81 MG 81 MG: 81 TABLET ORAL at 10:04

## 2019-04-02 RX ADMIN — MORPHINE SULFATE 2 MG: 2 INJECTION, SOLUTION INTRAMUSCULAR; INTRAVENOUS at 11:02

## 2019-04-02 RX ADMIN — POTASSIUM CHLORIDE 10 MEQ: 10 INJECTION, SOLUTION INTRAVENOUS at 08:48

## 2019-04-02 RX ADMIN — MULTIPLE VITAMINS W/ MINERALS TAB 1 TABLET: TAB at 21:30

## 2019-04-02 RX ADMIN — INSULIN ASPART 2 UNITS: 100 INJECTION, SOLUTION INTRAVENOUS; SUBCUTANEOUS at 08:23

## 2019-04-02 RX ADMIN — LORAZEPAM 0.5 MG: 2 INJECTION INTRAMUSCULAR; INTRAVENOUS at 12:28

## 2019-04-02 RX ADMIN — KETOROLAC TROMETHAMINE 30 MG: 30 INJECTION, SOLUTION INTRAMUSCULAR; INTRAVENOUS at 11:47

## 2019-04-02 RX ADMIN — DOPAMINE HYDROCHLORIDE IN DEXTROSE 2 MCG/KG/MIN: 1.6 INJECTION, SOLUTION INTRAVENOUS at 06:58

## 2019-04-02 RX ADMIN — MORPHINE SULFATE 2 MG: 2 INJECTION, SOLUTION INTRAMUSCULAR; INTRAVENOUS at 00:16

## 2019-04-02 RX ADMIN — POTASSIUM CHLORIDE 10 MEQ: 10 INJECTION, SOLUTION INTRAVENOUS at 06:48

## 2019-04-02 RX ADMIN — DEXTROSE MONOHYDRATE AND SODIUM CHLORIDE: 5; .9 INJECTION, SOLUTION INTRAVENOUS at 10:04

## 2019-04-02 RX ADMIN — MORPHINE SULFATE 2 MG: 2 INJECTION, SOLUTION INTRAMUSCULAR; INTRAVENOUS at 21:37

## 2019-04-02 RX ADMIN — POTASSIUM CHLORIDE 10 MEQ: 10 INJECTION, SOLUTION INTRAVENOUS at 07:44

## 2019-04-02 RX ADMIN — INSULIN ASPART 2 UNITS: 100 INJECTION, SOLUTION INTRAVENOUS; SUBCUTANEOUS at 11:51

## 2019-04-02 RX ADMIN — SODIUM CHLORIDE: 9 INJECTION, SOLUTION INTRAVENOUS at 01:29

## 2019-04-02 RX ADMIN — PERFLUTREN 2 ML: 6.52 INJECTION, SUSPENSION INTRAVENOUS at 11:20

## 2019-04-02 RX ADMIN — MORPHINE SULFATE 2 MG: 2 INJECTION, SOLUTION INTRAMUSCULAR; INTRAVENOUS at 16:50

## 2019-04-02 RX ADMIN — MORPHINE SULFATE 2 MG: 2 INJECTION, SOLUTION INTRAMUSCULAR; INTRAVENOUS at 04:27

## 2019-04-02 RX ADMIN — INSULIN ASPART 2 UNITS: 100 INJECTION, SOLUTION INTRAVENOUS; SUBCUTANEOUS at 16:51

## 2019-04-02 RX ADMIN — TAZOBACTAM SODIUM AND PIPERACILLIN SODIUM 3.38 G: 375; 3 INJECTION, SOLUTION INTRAVENOUS at 21:31

## 2019-04-02 RX ADMIN — POTASSIUM CHLORIDE 10 MEQ: 10 INJECTION, SOLUTION INTRAVENOUS at 11:02

## 2019-04-02 RX ADMIN — CLOPIDOGREL 75 MG: 75 TABLET, FILM COATED ORAL at 10:04

## 2019-04-02 RX ADMIN — POTASSIUM CHLORIDE 10 MEQ: 10 INJECTION, SOLUTION INTRAVENOUS at 10:01

## 2019-04-02 RX ADMIN — DEXTROSE MONOHYDRATE AND SODIUM CHLORIDE: 5; .9 INJECTION, SOLUTION INTRAVENOUS at 18:13

## 2019-04-02 RX ADMIN — DEXTROSE MONOHYDRATE AND SODIUM CHLORIDE: 5; .9 INJECTION, SOLUTION INTRAVENOUS at 02:29

## 2019-04-02 ASSESSMENT — ACTIVITIES OF DAILY LIVING (ADL)
ADLS_ACUITY_SCORE: 12
ADLS_ACUITY_SCORE: 13
ADLS_ACUITY_SCORE: 12
ADLS_ACUITY_SCORE: 14

## 2019-04-02 NOTE — H&P
"[facility]    History and Physical  Hospitalist       Date of Admission:  4/1/2019    Assessment & Plan   Natalee Montalvo is a 68 year old female who presents with falls, weakness.    Principal Problem:    Hypovolemic shock (H)    Assessment: Blood pressure persistently in the 70s systolic.  The patient is received 4 L of crystalloid and finally has blood pressures in the 90s systolic while in the ED.  No evidence for septic shock or cardiogenic shock.  Hemoglobin normal.  Per her family she does not eat, but drinks alcohol daily.    Plan: Admit to ICU.  Low-dose dopamine if blood pressures drop.  Continue aggressive IV fluid replacement.    Active Problems:    CAD (coronary artery disease)    Assessment: Chronic.  Recent non-ST elevation MI.  Troponin negative.  EKG does show ST depressions in V1 and V2 which have been seen on prior EKGs according to care everywhere.    Plan: Serial troponins, monitor EKG.      COPD (chronic obstructive pulmonary disease) (H)    Assessment: Chronic, stable.  No evidence for an exacerbation at this time    Plan: monitor      Alcoholism (H)    Assessment: Patient is a heavy alcohol user.  Her blood alcohol level is 0.04 on admission.  I am concerned that she could develop alcohol withdrawal.    Plan: CIWA protocol once hemodynamically stable.      Hypokalemia    Assessment: Severe, present on admission.  Related to nutrition and nausea vomiting diarrhea    Plan: Replace      Hyponatremia    Assessment: Severe, related to poor intake and \"tea and toast diet\"    Plan: Normal saline should improve sodium levels    Hypomagnesemia  Assessment: Present on admission  Plan: Replace      Closed fracture of multiple ribs of right side    Assessment: Patient has 4 rib fractures on the right that are all acute and 2 old fractures on the left.    Plan: PT OT, Toradol for pain control      Prolonged Q-T interval on ECG    Assessment: Presumed secondary to hypokalemia and " hypomagnesemia.  I do not see any other medications that prolong QT.    Plan: Replace potassium and magnesium    DVT Prophylaxis: Pneumatic Compression Devices  Code Status: Full    John Saldivar    Primary Care Physician   TERE GRIDER    Chief Complaint   Weakness, falls    History is obtained from the patient and chart review.    History of Present Illness   Natalee Montalvo is a 68 year old female who presents with recurrent falls.  She has a history of heavy alcohol use and coronary artery disease.  Her sister was visiting her at her home today and witnessed her falling.  EMS was called.  Upon arrival she had a systolic blood pressure of 60 and a blood glucose of 60.  On arrival to the emergency department she has had persistent blood pressures in the 70-80 range.  Blood glucose has normalized.  She has a normal troponin.  EKG shows some ST depressions in the anterior precordial leads.  Patient has been drinking and not been eating well per her family.  She says she has had nausea and vomiting at home as well as some diarrhea.  She states she continues to take her medications.    She has a recent history of a non-ST elevation MI as well as left subclavian artery bypass.  She had a stress test on January 25 with no reversible ischemia.    Her emergency room evaluation includes a CT angiogram of the chest which shows no PE but does show multiple rib fractures.  Laboratory studies show profound hyponatremia of 121, hypokalemia of 2.5, and magnesium of 1.3.  Liver and kidney function appear normal.  She has no fever and a normal white blood cell count.  She has had persistent hypotension with a normal lactate.  After 3 L of normal saline, her blood pressures tyra to the 90s systolic range.  She was admitted to the ICU.    Past Medical History    I have reviewed this patient's medical history and updated it with pertinent information if needed.   Past Medical History:   Diagnosis Date     Alcoholism (H)  4/1/2019     CAD (coronary artery disease) 4/1/2019     COPD (chronic obstructive pulmonary disease) (H) 4/1/2019       Past Surgical History   I have reviewed this patient's surgical history and updated it with pertinent information if needed.  History reviewed. No pertinent surgical history.    Prior to Admission Medications   Prior to Admission Medications   Prescriptions Last Dose Informant Patient Reported? Taking?   LORazepam (ATIVAN) 0.5 MG tablet Unknown at Unknown time  Yes No   albuterol (PROAIR HFA/PROVENTIL HFA/VENTOLIN HFA) 108 (90 BASE) MCG/ACT Inhaler Past Week at Unknown time  Yes Yes   Sig: Inhale 2 puffs into the lungs every 4 hours as needed   aspirin (ASA) 81 MG chewable tablet 4/1/2019 at Unknown time  Yes Yes   Sig: Take 81 mg by mouth daily   atorvastatin (LIPITOR) 80 MG tablet 4/1/2019 at Unknown time  Yes Yes   hydrochlorothiazide (HYDRODIURIL) 25 MG tablet 4/1/2019 at Unknown time  Yes Yes      Facility-Administered Medications: None     Allergies   Allergies   Allergen Reactions     Augmentin      Other reaction(s): GI intolerance  Stomach cramps     Bupropion      Other reaction(s): Other  Insomnia and tension     Oxycodone      nausea     Pseudoephedrine      Other reaction(s): Increased blood pressure, Tachycardia  Advise not to use this medication which was in her allergy product.     Nicotine Rash     Sertraline Rash       Social History   I have reviewed this patient's social history and updated it with pertinent information if needed. Natalee Montalvo  reports that she has been smoking cigarettes.  She has been smoking about 0.50 packs per day. she has never used smokeless tobacco. She reports that she drinks alcohol. She reports that she does not use drugs.    Family History   I have reviewed this patient's family history and updated it with pertinent information if needed.   History reviewed. No pertinent family history.    Review of Systems     REVIEW OF SYSTEMS:     Constitutional: no energy and no appetite, no recent sick contacts  Eyes: no changes in vision  Ears, nose, mouth, throat, and face: no mouth sores, dysphagia, or odynophagia  Respiratory: no shortness of breath, cough, or wheezing. No aspiration symptoms.   Cardiovascular: no chest pain, palpitations, orthopnea, increased lower extremity edema, or syncope.   Gastrointestinal: complains of nausea, vomiting, diarrhea  Genitourinary: no dysuria, hematuria, urgency or frequency.   Hematologic/lymphatic: no unintentional weight loss or night sweats.  Musculoskeletal: complains of right rib pain, having multiple falls.   Neurological: no new weakness, tingling, numbness.   Psychiatric: no hallucinations ordelusions.  Endocrine:  not a known diabetic.     Physical Exam   Temp: 98.4  F (36.9  C) Temp src: Tympanic BP: 93/55 Pulse: 82 Heart Rate: 82 Resp: 12 SpO2: 99 % O2 Device: None (Room air)    Vital Signs with Ranges  Temp:  [98.4  F (36.9  C)] 98.4  F (36.9  C)  Pulse:  [73-82] 82  Heart Rate:  [71-86] 82  Resp:  [11-22] 12  BP: ()/(29-71) 93/55  SpO2:  [90 %-99 %] 99 %  142 lbs 0 oz    Constitutional: In no apparent distress  Eyes: pupils reactive, extraocular movements intact. Anicteric sclera.   HEENT: TM's normal, oropharynx nonerythematous. Neck supple, no JVD.  Respiratory: no crackles noted, no wheezes.  Cardiovascular: regular, no murmur. No lower extremity edema.  GI: soft, non-tender, bowel sounds present.  Lymph/Hematologic: no cervical or supraclavicular LAD.  Genitourinary: deferred  Skin:  Multiple bruises  Musculoskeletal: no joint erythema or swelling  Neurologic: cranial nerves symmetric. Neuro exam nonfocal  Psychiatric: alert and oriented x3. Interactive.       Data   Data reviewed today:  I personally reviewed the chest CT image(s) showing no PE, rib fractures.  Recent Labs   Lab 04/01/19 1938 04/01/19  1720   WBC  --  6.7   HGB  --  11.9   MCV  --  95   PLT  --  219   NA  --  121*    POTASSIUM 2.7* 2.5*   CHLORIDE  --  80*   CO2  --  20*   BUN  --  6*   CR  --  0.56*   ANIONGAP  --  21*   FELICIANO  --  8.6   GLC  --  149*   ALBUMIN  --  3.5   PROTTOTAL  --  6.4   BILITOTAL  --  0.6   ALKPHOS  --  102   ALT  --  35   AST  --  65*   TROPI <0.030 <0.030       Recent Results (from the past 24 hour(s))   XR Chest Port 1 View    Narrative    PROCEDURE:  XR CHEST PORT 1 VW    HISTORY: chest tightness -recent NSTEMI. .    COMPARISON:  6/5/07.    FINDINGS:    The cardiomediastinal contours are within normal limits. There is  calcific aortic atherosclerosis. Changes of prior median sternotomy  are seen.  No focal consolidation, effusion or pneumothorax.  Age indeterminant right mid posterior rib fractures are noted.      Impression    IMPRESSION:  Age-indeterminate right rib fractures.      SEEMA ALEXANDRE MD   CT Head w/o Contrast    Narrative    PROCEDURE: CT HEAD W/O CONTRAST     HISTORY: Head trauma, minor, pt on anticoagulation.    COMPARISON: None.    TECHNIQUE:  Helical images of the head from the foramen magnum to the  vertex were obtained without contrast.    FINDINGS: The ventricles and sulci are prominent, compatible with  mild, generalized volume loss. No acute intracranial hemorrhage, mass  effect, midline shift, hydrocephalus or basilar cystern effacement are  present.    The grey-white matter interface is preserved.     The calvarium is intact. An air-fluid level is present in the left  maxillary sinus, which can be seen in acute on chronic sinusitis.      Impression    IMPRESSION: No acute intracranial hemorrhage.      SEEMA ALEXANDRE MD   CT Cervical Spine w/o Contrast    Narrative    PROCEDURE: CT CERVICAL SPINE W/O CONTRAST     HISTORY: C-spine trauma, ligamentous injury suspected.    TECHNIQUE: Helical noncontrast CT images of the cervical spine.    COMPARISON: None.    FINDINGS:     No acute fracture is identified. The vertebral bodies are normal in  height. The cervical lordosis  is straightened. The C1-2 articulation  and the craniocervical junction are intact.     Scattered senescent changes are present. Vascular calcifications are  noted at the carotid bifurcations. Bulky calcific change within the  proximal left subclavian may reflect an occluded stent. Correlate for  evidence of subclavian steal.    Emphysematous changes are present at the lung apices.       Impression    IMPRESSION: No evidence of acute cervical spine fracture.    SEEMA ALEXANDRE MD   CT Chest Pulmonary Embolism w Contrast    Narrative    PROCEDURE:  CT CHEST PULMONARY EMBOLISM W CONTRAST.    HISTORY:  Evaluate for pulmonary embolism.  PE suspected, high pretest  prob    TECHNIQUE:  Initial pre-contrast  and localizer images were  obtained.  Contrast enhanced helical CT pulmonary angiography was then  performed.  Routine transaxial and post-processed (multiplanar and/or  MIP) reformations were obtained.    COMPARISON:  Radiograph earlier today.    MEDS/CONTRAST: 100 mL omnipaque 350    PULMONARY CTA FINDINGS:  This is a diagnostic quality helical CT  pulmonary angiogram.  There is no acute pulmonary embolism within the  limitations of mild respiratory motion.    OTHER FINDINGS:      The heart is borderline enlarged. Coronary calcifications are present.  There is no pericardial thickening or effusions. There is no  mediastinal, hilar, or axillary lymphadenopathy. The thoracic aorta  and main pulmonary artery are within normal limits in size.     Limited views of the upper abdomen reveal no adrenal mass or acute  process. Increased colonic submucosal fat is present.    The pleura is without thickening or effusions. The central airways are  unremarkable. Dependent atelectasis is present.    A chronic right fifth for rib fracture is present. Acute right sixth,  seventh, eighth and ninth rib fractures are present.      Impression    IMPRESSION:    No acute pulmonary emboli.    Acute right sixth, seventh, eighth and  ninth rib fractures are  present. Probable atelectasis at the bases from splinting. No  pneumothorax.    SEEMA ALEXANDRE MD

## 2019-04-02 NOTE — PHARMACY-ADMISSION MEDICATION HISTORY
"Pharmacy -- Admission Medication Reconciliation    Prior to admission (PTA) medications were reviewed and the patient's PTA medication list was updated.    Sources Consulted: patient, [employer], [place] , Care Everywhere    The reliability of this Medication Reconciliation is: Reliability: Borderline reliable    The following significant changes were made:  -Symbicort ADDED - patient states she is almost out of this medication and needs a refill.  There is no refill history on file at [facility] for this medication.  -clopidogrel ADDED -  per chart review patient is to take for 1 year (until 1/25/20)  -Flonase ADDED - patient had been getting this filled regularly but states she can no longer afford this so has not been using recently  -Furosemide 20 mg daily ADDED - this medication was started on 3/20/19  -Duonebs ADDED - patient states she cannot afford this medication but that she should be taking it  -lisinopril 5 mg daily ADDED - this medication was started on 3/20/19 but patient states she did not take for 3 days because she could feel her blood pressure was \"low\"  -Metoprolol ER ADDED  -multivitamin ADDED  -nitroglycerin ADDED  -omeprazole ADDED  -Silvadene cream ADDED  -lorazepam REMOVED - patient states she has not taken any for a very long time -  records support this  -hydrochlorothiazide REMOVED - patient states she does not take this medication but it has been filled regularly at [facility] (last fill 2/15/19 #90 tabs)    In addition, the patient's allergies were reviewed with the patient and updated as follows:   Allergies: Augmentin; Bupropion; Nicotine; Oxycodone; Pseudoephedrine; and Sertraline    The pharmacist has reviewed with the patient that all personal medications should be removed from the building or locked in the belongings safe.  Patient shall only take medications ordered by the physician and administered by the nursing staff.       Medication barriers identified: patient states she cannot " afford her medications   Medication adherence concerns: yes due to cost- information provided to patient on [facility], lifestyle   Understanding of emergency medications: has nitroglycerin but has never had to use    Rossisa ROBERT [facility], 4/2/2019,  1:15 PM

## 2019-04-02 NOTE — PROGRESS NOTES
MD at bedside.  BP is elevated, patient sitting on side of bed.  Dopamine stopped at this moment.  Continue to monitor BP, MAP.

## 2019-04-02 NOTE — PLAN OF CARE
Patient has been sustaining blood pressures 120's/60-70's and has remained off the dopamine drip since 1238 today.  She has rested most of the day, has c/o pain on right rib cage which toradol seems to be helping mostly with small bumps of morphine in between.  She did have an anxiety attack today (mentioned in previous note) relieved by 0.5mg of Ativan.  CIWA's completed and only rated around lunch hour d/t anxiety attack, otherwise they have been 0.  Her potassium was replaced with 60 meq today and recheck showed a level of 3.4.  Blood sugars have been elevated, patient on D5NS all day.  Patient requiring O2, will continue to titrate PRN.

## 2019-04-02 NOTE — PLAN OF CARE
"Pt arrives via cart and monitored from ED with diagnosis of fall down stairs and hypotension.  Pt is alert and oriented, speech clear.  NS infusing at 125/hr and D5NS is infusing at 125/hr.  Pt oriented to room, call light, controls on bed and Television.  Pt ate 75% of sandwich, fruit, and milk.  Pt states that she is having pain on the right side of her chest (rib fractures 6-9) from her fall.  Pt has multiple bruises in various stages of healing scattered all over her body.  Pt admits to falling \"at least 10 times\" in the past 6 months.  Pt admits to daily alcohol intake.  States she lives alone in a house and normally performs all self care.  Pt has a frequent productive cough which she states that she has had for about 2 weeks.  IVs x 2 patent.  "

## 2019-04-02 NOTE — PROGRESS NOTES
Phoned MD. Braden stating that she feels like she wants a cigarette and feels like she is having an anxiety attack.  She states that she does get these at home and takes medications for it.  She is sitting on the edge of the bed, swaying.  Quick CIWA assessment done and all is negative aside from the acute anxiety, no c/o nausea, tactile disturbance, headache, agitation, etc.  New orders received for Ativan.  MD to place orders.

## 2019-04-03 LAB
ALBUMIN SERPL-MCNC: 3.1 G/DL (ref 3.5–5.7)
ALP SERPL-CCNC: 81 U/L (ref 34–104)
ALT SERPL W P-5'-P-CCNC: 25 U/L (ref 7–52)
ANION GAP SERPL CALCULATED.3IONS-SCNC: 6 MMOL/L (ref 3–14)
AST SERPL W P-5'-P-CCNC: 34 U/L (ref 13–39)
BASOPHILS # BLD AUTO: 0.1 10E9/L (ref 0–0.2)
BASOPHILS NFR BLD AUTO: 0.9 %
BILIRUB SERPL-MCNC: 0.4 MG/DL (ref 0.3–1)
BUN SERPL-MCNC: <2 MG/DL (ref 7–25)
CALCIUM SERPL-MCNC: 7.6 MG/DL (ref 8.6–10.3)
CHLORIDE SERPL-SCNC: 97 MMOL/L (ref 98–107)
CO2 SERPL-SCNC: 29 MMOL/L (ref 21–31)
CREAT SERPL-MCNC: 0.42 MG/DL (ref 0.6–1.2)
DIFFERENTIAL METHOD BLD: ABNORMAL
EOSINOPHIL # BLD AUTO: 0.1 10E9/L (ref 0–0.7)
EOSINOPHIL NFR BLD AUTO: 1.7 %
ERYTHROCYTE [DISTWIDTH] IN BLOOD BY AUTOMATED COUNT: 14.1 % (ref 10–15)
GFR SERPL CREATININE-BSD FRML MDRD: >90 ML/MIN/{1.73_M2}
GLUCOSE SERPL-MCNC: 141 MG/DL (ref 70–105)
HCT VFR BLD AUTO: 29.6 % (ref 35–47)
HGB BLD-MCNC: 10.1 G/DL (ref 11.7–15.7)
IMM GRANULOCYTES # BLD: 0 10E9/L (ref 0–0.4)
IMM GRANULOCYTES NFR BLD: 0.2 %
LYMPHOCYTES # BLD AUTO: 1.1 10E9/L (ref 0.8–5.3)
LYMPHOCYTES NFR BLD AUTO: 19.3 %
MAGNESIUM SERPL-MCNC: 1.2 MG/DL (ref 1.9–2.7)
MAGNESIUM SERPL-MCNC: 2.3 MG/DL (ref 1.9–2.7)
MCH RBC QN AUTO: 34.9 PG (ref 26.5–33)
MCHC RBC AUTO-ENTMCNC: 34.1 G/DL (ref 31.5–36.5)
MCV RBC AUTO: 102 FL (ref 78–100)
MONOCYTES # BLD AUTO: 0.6 10E9/L (ref 0–1.3)
MONOCYTES NFR BLD AUTO: 10.7 %
NEUTROPHILS # BLD AUTO: 3.7 10E9/L (ref 1.6–8.3)
NEUTROPHILS NFR BLD AUTO: 67.2 %
PLATELET # BLD AUTO: 150 10E9/L (ref 150–450)
POTASSIUM SERPL-SCNC: 2.7 MMOL/L (ref 3.5–5.1)
POTASSIUM SERPL-SCNC: 4.1 MMOL/L (ref 3.5–5.1)
PROT SERPL-MCNC: 5.4 G/DL (ref 6.4–8.9)
RBC # BLD AUTO: 2.89 10E12/L (ref 3.8–5.2)
SODIUM SERPL-SCNC: 132 MMOL/L (ref 134–144)
WBC # BLD AUTO: 5.4 10E9/L (ref 4–11)

## 2019-04-03 PROCEDURE — 36415 COLL VENOUS BLD VENIPUNCTURE: CPT | Performed by: INTERNAL MEDICINE

## 2019-04-03 PROCEDURE — 25000128 H RX IP 250 OP 636: Performed by: INTERNAL MEDICINE

## 2019-04-03 PROCEDURE — 80053 COMPREHEN METABOLIC PANEL: CPT | Performed by: INTERNAL MEDICINE

## 2019-04-03 PROCEDURE — 12000000 ZZH R&B MED SURG/OB

## 2019-04-03 PROCEDURE — A9270 NON-COVERED ITEM OR SERVICE: HCPCS | Mod: GY | Performed by: FAMILY MEDICINE

## 2019-04-03 PROCEDURE — 25800030 ZZH RX IP 258 OP 636: Performed by: FAMILY MEDICINE

## 2019-04-03 PROCEDURE — 83735 ASSAY OF MAGNESIUM: CPT | Performed by: INTERNAL MEDICINE

## 2019-04-03 PROCEDURE — 25000128 H RX IP 250 OP 636: Performed by: FAMILY MEDICINE

## 2019-04-03 PROCEDURE — A9270 NON-COVERED ITEM OR SERVICE: HCPCS | Mod: GY | Performed by: INTERNAL MEDICINE

## 2019-04-03 PROCEDURE — 25000132 ZZH RX MED GY IP 250 OP 250 PS 637: Mod: GY | Performed by: FAMILY MEDICINE

## 2019-04-03 PROCEDURE — 84132 ASSAY OF SERUM POTASSIUM: CPT | Performed by: INTERNAL MEDICINE

## 2019-04-03 PROCEDURE — 85025 COMPLETE CBC W/AUTO DIFF WBC: CPT | Performed by: INTERNAL MEDICINE

## 2019-04-03 PROCEDURE — 99233 SBSQ HOSP IP/OBS HIGH 50: CPT | Performed by: INTERNAL MEDICINE

## 2019-04-03 PROCEDURE — 25000132 ZZH RX MED GY IP 250 OP 250 PS 637: Mod: GY | Performed by: INTERNAL MEDICINE

## 2019-04-03 RX ORDER — FUROSEMIDE 10 MG/ML
40 INJECTION INTRAMUSCULAR; INTRAVENOUS ONCE
Status: COMPLETED | OUTPATIENT
Start: 2019-04-03 | End: 2019-04-03

## 2019-04-03 RX ADMIN — POTASSIUM CHLORIDE 40 MEQ: 1500 TABLET, EXTENDED RELEASE ORAL at 09:39

## 2019-04-03 RX ADMIN — DEXTROSE MONOHYDRATE AND SODIUM CHLORIDE: 5; .9 INJECTION, SOLUTION INTRAVENOUS at 02:54

## 2019-04-03 RX ADMIN — FOLIC ACID 1 MG: 1 TABLET ORAL at 09:31

## 2019-04-03 RX ADMIN — LORAZEPAM 2 MG: 2 INJECTION INTRAMUSCULAR; INTRAVENOUS at 17:16

## 2019-04-03 RX ADMIN — KETOROLAC TROMETHAMINE 30 MG: 30 INJECTION, SOLUTION INTRAMUSCULAR; INTRAVENOUS at 09:29

## 2019-04-03 RX ADMIN — LORAZEPAM 2 MG: 2 INJECTION INTRAMUSCULAR; INTRAVENOUS at 15:02

## 2019-04-03 RX ADMIN — CLOPIDOGREL 75 MG: 75 TABLET, FILM COATED ORAL at 09:31

## 2019-04-03 RX ADMIN — ASPIRIN 81 MG 81 MG: 81 TABLET ORAL at 09:30

## 2019-04-03 RX ADMIN — ATORVASTATIN CALCIUM 80 MG: 40 TABLET, FILM COATED ORAL at 09:31

## 2019-04-03 RX ADMIN — MORPHINE SULFATE 2 MG: 2 INJECTION, SOLUTION INTRAMUSCULAR; INTRAVENOUS at 05:52

## 2019-04-03 RX ADMIN — MORPHINE SULFATE 2 MG: 2 INJECTION, SOLUTION INTRAMUSCULAR; INTRAVENOUS at 03:19

## 2019-04-03 RX ADMIN — KETOROLAC TROMETHAMINE 30 MG: 30 INJECTION, SOLUTION INTRAMUSCULAR; INTRAVENOUS at 16:41

## 2019-04-03 RX ADMIN — KETOROLAC TROMETHAMINE 30 MG: 30 INJECTION, SOLUTION INTRAMUSCULAR; INTRAVENOUS at 03:17

## 2019-04-03 RX ADMIN — TAZOBACTAM SODIUM AND PIPERACILLIN SODIUM 3.38 G: 375; 3 INJECTION, SOLUTION INTRAVENOUS at 21:24

## 2019-04-03 RX ADMIN — THIAMINE HCL TAB 100 MG 100 MG: 100 TAB at 09:32

## 2019-04-03 RX ADMIN — TAZOBACTAM SODIUM AND PIPERACILLIN SODIUM 3.38 G: 375; 3 INJECTION, SOLUTION INTRAVENOUS at 16:06

## 2019-04-03 RX ADMIN — LORAZEPAM 2 MG: 2 INJECTION INTRAMUSCULAR; INTRAVENOUS at 22:58

## 2019-04-03 RX ADMIN — INSULIN ASPART 1 UNITS: 100 INJECTION, SOLUTION INTRAVENOUS; SUBCUTANEOUS at 07:28

## 2019-04-03 RX ADMIN — FUROSEMIDE 40 MG: 10 INJECTION, SOLUTION INTRAMUSCULAR; INTRAVENOUS at 09:45

## 2019-04-03 RX ADMIN — TAZOBACTAM SODIUM AND PIPERACILLIN SODIUM 3.38 G: 375; 3 INJECTION, SOLUTION INTRAVENOUS at 03:43

## 2019-04-03 RX ADMIN — POTASSIUM CHLORIDE 40 MEQ: 1500 TABLET, EXTENDED RELEASE ORAL at 08:07

## 2019-04-03 RX ADMIN — MULTIPLE VITAMINS W/ MINERALS TAB 1 TABLET: TAB at 09:31

## 2019-04-03 RX ADMIN — LORAZEPAM 1 MG: 2 INJECTION INTRAMUSCULAR; INTRAVENOUS at 13:45

## 2019-04-03 RX ADMIN — LORAZEPAM 1 MG: 1 TABLET ORAL at 12:41

## 2019-04-03 RX ADMIN — LORAZEPAM 2 MG: 2 INJECTION INTRAMUSCULAR; INTRAVENOUS at 20:07

## 2019-04-03 RX ADMIN — LORAZEPAM 2 MG: 2 INJECTION INTRAMUSCULAR; INTRAVENOUS at 16:16

## 2019-04-03 RX ADMIN — LORAZEPAM 1 MG: 1 TABLET ORAL at 09:37

## 2019-04-03 RX ADMIN — MAGNESIUM SULFATE HEPTAHYDRATE 4 G: 40 INJECTION, SOLUTION INTRAVENOUS at 05:03

## 2019-04-03 RX ADMIN — TAZOBACTAM SODIUM AND PIPERACILLIN SODIUM 3.38 G: 375; 3 INJECTION, SOLUTION INTRAVENOUS at 09:47

## 2019-04-03 ASSESSMENT — ACTIVITIES OF DAILY LIVING (ADL)
ADLS_ACUITY_SCORE: 12
ADLS_ACUITY_SCORE: 13
ADLS_ACUITY_SCORE: 12
ADLS_ACUITY_SCORE: 13
ADLS_ACUITY_SCORE: 12
ADLS_ACUITY_SCORE: 12

## 2019-04-03 ASSESSMENT — MIFFLIN-ST. JEOR: SCORE: 1224.13

## 2019-04-03 NOTE — PHARMACY
Pharmacy - Transfer Medication Reconciliation     The patient's transfer medication orders have been compared to the medication administration record and to the Prior to Admissions Medications list - any noted discrepancies were resolved with the MD.     Thank you. Pharmacy will continue to monitor.     Glo Alexander [facility] ....................  4/3/2019   11:08 AM

## 2019-04-03 NOTE — PROGRESS NOTES
Report given to: DORINA Godinez    Time of transfer: 1031    Transferred to: [facility]    Belongings sent:Yes, Insulin pen and chart sent to receiving unit.     Family updated:No    Reviewed pertinent information from EPIC (EMAR/Clinical Summary/Flowsheets):Yes    Head-to-toe assessment with receiving RN:No    Recommendations (e.g. Family needs/recent issues/things to watch for): ETOH w/drawal.

## 2019-04-03 NOTE — PLAN OF CARE
"  Alcohol Withdrawal  Alcohol Withdrawal Symptom Control  4/3/2019 1804 by Gintete Hutchinson, RN  Note  Pt was A&O upon transfer to unit. Pt has become oriented to only self throughout the shift. CIWAs have been 8-13 throughout the shift, ativan given per protocol. Pt noted to have anxiety, tremors, and restless. Pt also reports dyspnea at rest, pt satting 93-96% on room air. Elevating the HOB helps a little bit. LS are diminished with crackles in the bases. Pt moved to room 305, family notified. Middle bed alarm set at all times.   Ginette Hutchinson, RN on 4/3/2019 at 6:06 PM    /71   Pulse 75   Temp 98.4  F (36.9  C) (Tympanic)   Resp 18   Ht 1.6 m (5' 3\")   Wt 73.5 kg (162 lb 0.6 oz)   SpO2 96%   Breastfeeding? No   BMI 28.70 kg/m         "

## 2019-04-03 NOTE — PROGRESS NOTES
"[facility]    Hospitalist Progress Note      Assessment & Plan   Natalee Montalvo is a 68 year old female who was admitted on 4/1/2019.     Hypovolemic shock (H)    Assessment: resolved. Suspect hypovolemia as cause, consider sepsis but unlikely. Borderline procalcitonin. Off pressors. Eating and drinking well.     Plan: transfer to medical floor. Saline lock. If procalcitonin normal tomorrow then stop zosyn.     Active Problems:    CAD (coronary artery disease)    Assessment: Chronic.  Recent non-ST elevation MI.  Echo shows EF 45-50%    Plan: monitor. Add troponin to this AM labs       COPD (chronic obstructive pulmonary disease) (H)    Assessment: Chronic, stable.  No evidence for an exacerbation at this time    Plan: monitor       Alcoholism (H)    Assessment: Patient is a heavy alcohol user.  Her blood alcohol level is 0.04 on admission.  I am concerned that she could develop alcohol withdrawal.    Plan: CIWA protocol        Hypokalemia    Assessment: Severe, present on admission.  Related to nutrition and nausea vomiting diarrhea. Not improving much yet.    Plan: Replace, replace mag.       Hyponatremia    Assessment: Severe, related to poor intake and \"tea and toast diet\". Improving with NS intravenous fluids.     Plan: monitor     Hypomagnesemia  Assessment: Present on admission. Persistently low  Plan: Replace       Closed fracture of multiple ribs of right side    Assessment: Patient has 4 rib fractures on the right that are all acute and 2 old fractures on the left.    Plan: PT OT, Toradol for pain control. Add oxycodone low dose today.       Prolonged Q-T interval on ECG    Assessment: Presumed secondary to hypokalemia and hypomagnesemia.  Still prolonged QTc of 0.50    Plan: Replace potassium and magnesium      Acute on Chronic systolic heart failure     Assessment: aggressive intravenous fluids has fluid overloaded    Plan: lasix 20 mg IV x1 today    DVT Prophylaxis: Pneumatic " Compression Devices  Code Status: Full Code    John Saldivar    Interval History   Feels better. Slept poorly. Has anxiety issues. Pain from ribs present. Productive cough. No nausea, vomiting. Eating and drinking.    -Data reviewed today: I reviewed all new labs and imaging results over the last 24 hours. I personally reviewed the Echo image(s) showing EF 45-50%, IVF not collapsing.    Physical Exam   Temp: 98  F (36.7  C) Temp src: Tympanic BP: 137/73   Heart Rate: 90 Resp: 12 SpO2: 93 % O2 Device: Nasal cannula Oxygen Delivery: 3 LPM  Vitals:    04/01/19 1646 04/01/19 2130   Weight: 64.4 kg (142 lb) 72 kg (158 lb 11.7 oz)     Vital Signs with Ranges  Temp:  [97.7  F (36.5  C)-98  F (36.7  C)] 98  F (36.7  C)  Heart Rate:  [] 90  Resp:  [7-23] 12  BP: ()/(35-88) 137/73  SpO2:  [87 %-100 %] 93 %  I/O last 3 completed shifts:  In: 4211.4 [P.O.:370; I.V.:3841.4]  Out: 2540 [Urine:2540]    GENERAL: Comfortable, no apparent distress.  CARDIOVASCULAR: regular rate and rhythm, no murmur. No lower extremity edema   RESPIRATORY: Crackles  GI: non-tender, non-distended, normal bowel sounds.   SKIN: warm periphery, no rashes      Medications     potassium chloride         sodium chloride 0.9%  1,000 mL Intravenous Once     aspirin  81 mg Oral Daily     atorvastatin  80 mg Oral Daily     clopidogrel  75 mg Oral Daily     folic acid  1 mg Oral Daily     furosemide  40 mg Intravenous Once     insulin aspart  1-7 Units Subcutaneous TID AC     insulin aspart  1-5 Units Subcutaneous At Bedtime     multivitamin w/minerals  1 tablet Oral Daily     piperacillin-tazobactam  3.375 g Intravenous Q6H     sodium chloride (PF)  3 mL Intracatheter Q8H     vitamin B1  100 mg Oral Daily       Data   Recent Labs   Lab 04/03/19  0400 04/02/19  1650 04/02/19  0400 04/01/19  1938 04/01/19  1720   WBC 5.4  --  5.7  --  6.7   HGB 10.1*  --  10.5*  --  11.9   *  --  99  --  95     --  177  --  219   *  --  129*  --   121*   POTASSIUM 2.7* 3.4* 2.5* 2.7* 2.5*   CHLORIDE 97*  --  93*  --  80*   CO2 29  --  29  --  20*   BUN <2*  --  5*  --  6*   CR 0.42*  --  0.51*  --  0.56*   ANIONGAP 6  --  7  --  21*   FELICIANO 7.6*  --  7.6*  --  8.6   *  --  202*  --  149*   ALBUMIN 3.1*  --  3.3*  --  3.5   PROTTOTAL 5.4*  --  5.5*  --  6.4   BILITOTAL 0.4  --  0.5  --  0.6   ALKPHOS 81  --  90  --  102   ALT 25  --  30  --  35   AST 34  --  50*  --  65*   TROPI  --   --  0.035* <0.030 <0.030     Recent Results (from the past 4320 hour(s))   Echocardiogram Complete    Narrative    [id number]  [id number]  [id number]  [id number]^ONEYDA^JACKSON^L        [facility]  [address] [place] [address]     Name: GLENNA AHN  MRN: [id number]  : 1950  Study Date: 2019 10:02 AM  Age: 68 yrs  Gender: Female  Patient Location: [facility]  Reason For Study: Other, Please Specify in Comments  Ordering Physician: JACKSON CALL  Performed By: Essie Dawkins RDCS, RVT     BSA: 1.7 m2  Height: 63 in  Weight: 158 lb  HR: 78  BP: 96/51 mmHg  _____________________________________________________________________________  __        Procedure  Complete Portable Echo Adult. Contrast Definity. Definity (NDC #46039-583-67)  given intravenously. Patient was given 2ml mixture of 1.5ml Definity and 8.5ml  saline. 8 ml wasted. Definity Lot # [id number] .  _____________________________________________________________________________  __        Interpretation Summary  Ischemic cardiomyopathy  Mildly (EF 45-50%) reduced left ventricular function is present. There is  hypokinesis of the basal-mid lateral segments and the mid-distal anterior  segments.  Global right ventricular function is normal.  Mild to moderate mitral insufficiency is present. Moderate tricuspid  insufficiency is present.  Pulmonary hypertension present. Estimated pulmonary artery systolic pressure  is 55 mmHg.  The inferior vena cava was normal in size with preserved  respiratory  variability.  No pericardial effusion is present.  _____________________________________________________________________________  __        Left Ventricle  Left ventricular wall thickness is normal. Mild left ventricular dilation is  present. Mildly (EF 45-50%) reduced left ventricular function is present.  There is hypokinesis of the basal-mid lateral segments and the mid-distal  anterior segments concerning for CAD in the LAD and LCx territorries. There is  no thrombus.     Right Ventricle  The right ventricle is normal size. Global right ventricular function is  normal.     Atria  The right atria appears normal. Severe left atrial enlargement is present.     Mitral Valve  Moderate mitral annular calcification is present. Mild to moderate mitral  insufficiency is present.     Aortic Valve  Trileaflet aortic sclerosis without stenosis.        Tricuspid Valve  Moderate tricuspid insufficiency is present. Estimated pulmonary artery  systolic pressure is 52 mmHg plus right atrial pressure.     Pulmonic Valve  The pulmonic valve is normal.     Vessels  The inferior vena cava was normal in size with preserved respiratory  variability. Ascending aorta 3.3 cm.     Pericardium  No pericardial effusion is present.     Compared to Previous Study  Previous study not available for comparison.     _____________________________________________________________________________  __  MMode/2D Measurements & Calculations  IVSd: 0.89 cm  LVIDd: 5.1 cm  LVIDs: 3.0 cm  LVPWd: 0.80 cm  FS: 40.2 %     LV mass(C)d: 149.5 grams  LV mass(C)dI: 85.4 grams/m2  Ao root diam: 3.2 cm  LA dimension: 4.4 cm  asc Aorta Diam: 3.3 cm  LA/Ao: 1.4  LVOT diam: 2.2 cm  LVOT area: 3.8 cm2  LA Volume (BP): 90.6 ml  LA Volume Index (BP): 51.8 ml/m2  RWT: 0.32        Doppler Measurements & Calculations  MV E max darion: 132.0 cm/sec  MV A max darion: 90.0 cm/sec  MV E/A: 1.5     MV dec time: 0.15 sec  Ao V2 max: 146.0 cm/sec  Ao max P.0  mmHg  YOUNG(V,D): 3.0 cm2  LV V1 max P.2 mmHg  LV V1 max: 114.0 cm/sec  TR max sonido: 340.3 cm/sec  TR max P.4 mmHg  AV Sonido Ratio (DI): 0.78  E/E' av.0  Lateral E/e': 15.0  Medial E/e': 19.0           _____________________________________________________________________________  __           Report approved by: Samuel Godinez 2019 01:00 PM

## 2019-04-03 NOTE — PROGRESS NOTES
Poor first half of night spent due to anxiety. VSS throughout the night. Patient was sleeping and pain got out of control at one point, but relieved well post medication and then able to rest well. Required to have 02 per nc on all night to maintain oxygen saturations. Patient did get a bit confused throughout the night but easily orientated.

## 2019-04-03 NOTE — PLAN OF CARE
"Adult Inpatient Plan of Care  Plan of Care Review  4/3/2019 1122 by Ginette Hutchinson, RN  Note  Pt is lethargic at times, but oriented x4 when awake. CIWA score 0. Pt denies pain when at rest, but states she has 9/10 pain in right rib cage when coughing. LS diminished with crackles in the bases bilaterally. Shallow breaths noted, pt encouraged deep breath and use IS. No cough noted, satting 93% on RA.   Ginette Hutchinson, RN on 4/3/2019 at 11:28 AM    /49   Pulse 75   Temp 98.3  F (36.8  C) (Tympanic)   Resp 16   Ht 1.6 m (5' 3\")   Wt 73.5 kg (162 lb 0.6 oz)   SpO2 93%   Breastfeeding? No   BMI 28.70 kg/m         "

## 2019-04-03 NOTE — PROGRESS NOTES
"[facility] Transfer Note    Patient admitted to room 333 at approximately DORINA Sher via wheel chair from ICU. Patient was accompanied by transport tech.     Verbal SBAR report received from DORINA Sher prior to patient arrival.     Patient ambulated to bed with one assist. Patient alert and oriented X 4. The patient is not having any pain. 0-10 Pain Scale: 8. Admission vital signs: Blood pressure 132/49, pulse 75, temperature 98.3  F (36.8  C), temperature source Tympanic, resp. rate 16, height 1.6 m (5' 3\"), weight 73.5 kg (162 lb 0.6 oz), SpO2 93 %, not currently breastfeeding. Patient was oriented to plan of care, call light, bed controls, tv, telephone, bathroom and visiting hours.     Risk Assessment    The following safety risks were identified during admission: fall. Yellow risk band applied: YES.     Ginette Hutchinson    "

## 2019-04-04 ENCOUNTER — ANESTHESIA EVENT (OUTPATIENT)
Dept: MEDSURG UNIT | Facility: OTHER | Age: 69
DRG: 871 | End: 2019-04-04
Payer: MEDICARE

## 2019-04-04 ENCOUNTER — APPOINTMENT (OUTPATIENT)
Dept: GENERAL RADIOLOGY | Facility: OTHER | Age: 69
DRG: 871 | End: 2019-04-04
Attending: INTERNAL MEDICINE
Payer: MEDICARE

## 2019-04-04 ENCOUNTER — ANESTHESIA (OUTPATIENT)
Dept: MEDSURG UNIT | Facility: OTHER | Age: 69
DRG: 871 | End: 2019-04-04
Payer: MEDICARE

## 2019-04-04 ENCOUNTER — APPOINTMENT (OUTPATIENT)
Dept: OCCUPATIONAL THERAPY | Facility: OTHER | Age: 69
DRG: 871 | End: 2019-04-04
Attending: INTERNAL MEDICINE
Payer: MEDICARE

## 2019-04-04 ENCOUNTER — APPOINTMENT (OUTPATIENT)
Dept: PHYSICAL THERAPY | Facility: OTHER | Age: 69
DRG: 871 | End: 2019-04-04
Attending: INTERNAL MEDICINE
Payer: MEDICARE

## 2019-04-04 LAB
ALBUMIN SERPL-MCNC: 3.1 G/DL (ref 3.5–5.7)
ALP SERPL-CCNC: 83 U/L (ref 34–104)
ALT SERPL W P-5'-P-CCNC: 23 U/L (ref 7–52)
ANION GAP SERPL CALCULATED.3IONS-SCNC: 7 MMOL/L (ref 3–14)
AST SERPL W P-5'-P-CCNC: 32 U/L (ref 13–39)
BILIRUB SERPL-MCNC: 0.6 MG/DL (ref 0.3–1)
BUN SERPL-MCNC: 3 MG/DL (ref 7–25)
CALCIUM SERPL-MCNC: 8.2 MG/DL (ref 8.6–10.3)
CHLORIDE SERPL-SCNC: 95 MMOL/L (ref 98–107)
CO2 SERPL-SCNC: 30 MMOL/L (ref 21–31)
CREAT SERPL-MCNC: 0.38 MG/DL (ref 0.6–1.2)
ERYTHROCYTE [DISTWIDTH] IN BLOOD BY AUTOMATED COUNT: 13.9 % (ref 10–15)
GFR SERPL CREATININE-BSD FRML MDRD: >90 ML/MIN/{1.73_M2}
GLUCOSE SERPL-MCNC: 131 MG/DL (ref 70–105)
HCT VFR BLD AUTO: 30.9 % (ref 35–47)
HGB BLD-MCNC: 10.5 G/DL (ref 11.7–15.7)
MAGNESIUM SERPL-MCNC: 1.5 MG/DL (ref 1.9–2.7)
MAGNESIUM SERPL-MCNC: 2.7 MG/DL (ref 1.9–2.7)
MCH RBC QN AUTO: 34.3 PG (ref 26.5–33)
MCHC RBC AUTO-ENTMCNC: 34 G/DL (ref 31.5–36.5)
MCV RBC AUTO: 101 FL (ref 78–100)
PLATELET # BLD AUTO: 143 10E9/L (ref 150–450)
POTASSIUM SERPL-SCNC: 3.1 MMOL/L (ref 3.5–5.1)
POTASSIUM SERPL-SCNC: 4.1 MMOL/L (ref 3.5–5.1)
PROCALCITONIN SERPL-MCNC: 2.3 NG/ML
PROT SERPL-MCNC: 5.8 G/DL (ref 6.4–8.9)
RBC # BLD AUTO: 3.06 10E12/L (ref 3.8–5.2)
SODIUM SERPL-SCNC: 132 MMOL/L (ref 134–144)
WBC # BLD AUTO: 5.3 10E9/L (ref 4–11)

## 2019-04-04 PROCEDURE — 25000128 H RX IP 250 OP 636: Performed by: FAMILY MEDICINE

## 2019-04-04 PROCEDURE — 84132 ASSAY OF SERUM POTASSIUM: CPT | Performed by: INTERNAL MEDICINE

## 2019-04-04 PROCEDURE — 36410 VNPNXR 3YR/> PHY/QHP DX/THER: CPT | Performed by: NURSE ANESTHETIST, CERTIFIED REGISTERED

## 2019-04-04 PROCEDURE — 71046 X-RAY EXAM CHEST 2 VIEWS: CPT

## 2019-04-04 PROCEDURE — 97530 THERAPEUTIC ACTIVITIES: CPT | Mod: GP

## 2019-04-04 PROCEDURE — 97161 PT EVAL LOW COMPLEX 20 MIN: CPT | Mod: GP

## 2019-04-04 PROCEDURE — 93005 ELECTROCARDIOGRAM TRACING: CPT

## 2019-04-04 PROCEDURE — A9270 NON-COVERED ITEM OR SERVICE: HCPCS | Mod: GY | Performed by: INTERNAL MEDICINE

## 2019-04-04 PROCEDURE — A9270 NON-COVERED ITEM OR SERVICE: HCPCS | Mod: GY | Performed by: FAMILY MEDICINE

## 2019-04-04 PROCEDURE — 97535 SELF CARE MNGMENT TRAINING: CPT | Mod: GO | Performed by: OCCUPATIONAL THERAPIST

## 2019-04-04 PROCEDURE — 12000000 ZZH R&B MED SURG/OB

## 2019-04-04 PROCEDURE — 36415 COLL VENOUS BLD VENIPUNCTURE: CPT | Performed by: INTERNAL MEDICINE

## 2019-04-04 PROCEDURE — 40000275 ZZH STATISTIC RCP TIME EA 10 MIN

## 2019-04-04 PROCEDURE — 99233 SBSQ HOSP IP/OBS HIGH 50: CPT | Performed by: INTERNAL MEDICINE

## 2019-04-04 PROCEDURE — 25800030 ZZH RX IP 258 OP 636: Performed by: INTERNAL MEDICINE

## 2019-04-04 PROCEDURE — 83735 ASSAY OF MAGNESIUM: CPT | Performed by: INTERNAL MEDICINE

## 2019-04-04 PROCEDURE — 25000132 ZZH RX MED GY IP 250 OP 250 PS 637: Mod: GY | Performed by: FAMILY MEDICINE

## 2019-04-04 PROCEDURE — 93010 ELECTROCARDIOGRAM REPORT: CPT | Performed by: INTERNAL MEDICINE

## 2019-04-04 PROCEDURE — 85027 COMPLETE CBC AUTOMATED: CPT | Performed by: INTERNAL MEDICINE

## 2019-04-04 PROCEDURE — 25000128 H RX IP 250 OP 636: Performed by: INTERNAL MEDICINE

## 2019-04-04 PROCEDURE — 84145 PROCALCITONIN (PCT): CPT | Performed by: INTERNAL MEDICINE

## 2019-04-04 PROCEDURE — 97530 THERAPEUTIC ACTIVITIES: CPT | Mod: GO | Performed by: OCCUPATIONAL THERAPIST

## 2019-04-04 PROCEDURE — 97166 OT EVAL MOD COMPLEX 45 MIN: CPT | Mod: GO | Performed by: OCCUPATIONAL THERAPIST

## 2019-04-04 PROCEDURE — 80053 COMPREHEN METABOLIC PANEL: CPT | Performed by: INTERNAL MEDICINE

## 2019-04-04 PROCEDURE — 25000132 ZZH RX MED GY IP 250 OP 250 PS 637: Mod: GY | Performed by: INTERNAL MEDICINE

## 2019-04-04 RX ORDER — ALBUTEROL SULFATE 0.83 MG/ML
2.5 SOLUTION RESPIRATORY (INHALATION) EVERY 6 HOURS PRN
Status: DISCONTINUED | OUTPATIENT
Start: 2019-04-04 | End: 2019-04-06 | Stop reason: HOSPADM

## 2019-04-04 RX ORDER — FUROSEMIDE 10 MG/ML
20 INJECTION INTRAMUSCULAR; INTRAVENOUS ONCE
Status: COMPLETED | OUTPATIENT
Start: 2019-04-04 | End: 2019-04-04

## 2019-04-04 RX ORDER — KETOROLAC TROMETHAMINE 15 MG/ML
15 INJECTION, SOLUTION INTRAMUSCULAR; INTRAVENOUS EVERY 6 HOURS PRN
Status: DISCONTINUED | OUTPATIENT
Start: 2019-04-04 | End: 2019-04-06 | Stop reason: HOSPADM

## 2019-04-04 RX ORDER — SODIUM CHLORIDE 9 MG/ML
INJECTION, SOLUTION INTRAVENOUS CONTINUOUS
Status: DISCONTINUED | OUTPATIENT
Start: 2019-04-04 | End: 2019-04-06 | Stop reason: HOSPADM

## 2019-04-04 RX ADMIN — MAGNESIUM SULFATE HEPTAHYDRATE 4 G: 40 INJECTION, SOLUTION INTRAVENOUS at 05:37

## 2019-04-04 RX ADMIN — TAZOBACTAM SODIUM AND PIPERACILLIN SODIUM 3.38 G: 375; 3 INJECTION, SOLUTION INTRAVENOUS at 03:40

## 2019-04-04 RX ADMIN — LORAZEPAM 1 MG: 1 TABLET ORAL at 00:21

## 2019-04-04 RX ADMIN — FOLIC ACID 1 MG: 1 TABLET ORAL at 09:33

## 2019-04-04 RX ADMIN — FUROSEMIDE 20 MG: 10 INJECTION, SOLUTION INTRAMUSCULAR; INTRAVENOUS at 16:24

## 2019-04-04 RX ADMIN — TAZOBACTAM SODIUM AND PIPERACILLIN SODIUM 3.38 G: 375; 3 INJECTION, SOLUTION INTRAVENOUS at 21:44

## 2019-04-04 RX ADMIN — KETOROLAC TROMETHAMINE 15 MG: 15 INJECTION, SOLUTION INTRAMUSCULAR; INTRAVENOUS at 22:26

## 2019-04-04 RX ADMIN — LORAZEPAM 2 MG: 2 INJECTION INTRAMUSCULAR; INTRAVENOUS at 22:23

## 2019-04-04 RX ADMIN — LORAZEPAM 2 MG: 2 INJECTION INTRAMUSCULAR; INTRAVENOUS at 06:08

## 2019-04-04 RX ADMIN — LORAZEPAM 2 MG: 1 TABLET ORAL at 01:32

## 2019-04-04 RX ADMIN — LORAZEPAM 2 MG: 2 INJECTION INTRAMUSCULAR; INTRAVENOUS at 02:17

## 2019-04-04 RX ADMIN — TAZOBACTAM SODIUM AND PIPERACILLIN SODIUM 3.38 G: 375; 3 INJECTION, SOLUTION INTRAVENOUS at 09:46

## 2019-04-04 RX ADMIN — MULTIPLE VITAMINS W/ MINERALS TAB 1 TABLET: TAB at 09:32

## 2019-04-04 RX ADMIN — LORAZEPAM 1 MG: 2 INJECTION INTRAMUSCULAR; INTRAVENOUS at 14:45

## 2019-04-04 RX ADMIN — LORAZEPAM 1 MG: 2 INJECTION INTRAMUSCULAR; INTRAVENOUS at 18:22

## 2019-04-04 RX ADMIN — CLOPIDOGREL 75 MG: 75 TABLET, FILM COATED ORAL at 09:32

## 2019-04-04 RX ADMIN — POTASSIUM CHLORIDE 20 MEQ: 1500 TABLET, EXTENDED RELEASE ORAL at 09:32

## 2019-04-04 RX ADMIN — LORAZEPAM 1 MG: 1 TABLET ORAL at 09:32

## 2019-04-04 RX ADMIN — POTASSIUM CHLORIDE 40 MEQ: 1500 TABLET, EXTENDED RELEASE ORAL at 05:38

## 2019-04-04 RX ADMIN — SODIUM CHLORIDE: 9 INJECTION, SOLUTION INTRAVENOUS at 03:40

## 2019-04-04 RX ADMIN — LORAZEPAM 2 MG: 2 INJECTION INTRAMUSCULAR; INTRAVENOUS at 13:48

## 2019-04-04 RX ADMIN — KETOROLAC TROMETHAMINE 15 MG: 15 INJECTION, SOLUTION INTRAMUSCULAR; INTRAVENOUS at 11:33

## 2019-04-04 RX ADMIN — LORAZEPAM 2 MG: 2 INJECTION INTRAMUSCULAR; INTRAVENOUS at 11:50

## 2019-04-04 RX ADMIN — LORAZEPAM 2 MG: 2 INJECTION INTRAMUSCULAR; INTRAVENOUS at 21:44

## 2019-04-04 RX ADMIN — TAZOBACTAM SODIUM AND PIPERACILLIN SODIUM 3.38 G: 375; 3 INJECTION, SOLUTION INTRAVENOUS at 16:25

## 2019-04-04 RX ADMIN — THIAMINE HCL TAB 100 MG 100 MG: 100 TAB at 09:33

## 2019-04-04 RX ADMIN — ATORVASTATIN CALCIUM 80 MG: 40 TABLET, FILM COATED ORAL at 09:32

## 2019-04-04 RX ADMIN — LORAZEPAM 2 MG: 2 INJECTION INTRAMUSCULAR; INTRAVENOUS at 16:23

## 2019-04-04 RX ADMIN — LORAZEPAM 2 MG: 1 TABLET ORAL at 15:23

## 2019-04-04 RX ADMIN — LORAZEPAM 2 MG: 2 INJECTION INTRAMUSCULAR; INTRAVENOUS at 10:45

## 2019-04-04 RX ADMIN — ASPIRIN 81 MG 81 MG: 81 TABLET ORAL at 09:33

## 2019-04-04 RX ADMIN — MORPHINE SULFATE 2 MG: 2 INJECTION, SOLUTION INTRAMUSCULAR; INTRAVENOUS at 12:53

## 2019-04-04 ASSESSMENT — ACTIVITIES OF DAILY LIVING (ADL)
ADLS_ACUITY_SCORE: 11
ADLS_ACUITY_SCORE: 12.5
ADLS_ACUITY_SCORE: 11
ADLS_ACUITY_SCORE: 14
ADLS_ACUITY_SCORE: 11
ADLS_ACUITY_SCORE: 14

## 2019-04-04 NOTE — PROGRESS NOTES
Patient in bed restless complaining of pain, repositioned patient and offered ice pack and pillows. Patient in bed at this time.

## 2019-04-04 NOTE — PLAN OF CARE
Discharge Planner PT   Patient plan for discharge: short term rehab shantelle be beneficial  Current status: moderate assist of 2 for all mobility  Barriers to return to prior living situation: fatigue, weakness, instability  Recommendations for discharge: continued PT  Rationale for recommendations: to promote strength, stability and safe mobility       Entered by: Cristi Alexander 04/04/2019 2:15 PM

## 2019-04-04 NOTE — PROGRESS NOTES
Highest CIWA score 14. Ativan 2 mg IVP and PO administered frequently t/o night. Patient very slow to respond to cues and answering questions. Patient attempting to get out of bed numerous times. Restless, agitated, and anxious throughout evening. Forgetful at times. VSS with exception of HR 95-120s. Denies pain. LS diminshed, but has a congested, non-prod cough. Will continue to monitor and intervene as needed. Malinda Elmore RN on 4/4/2019 at 6:17 AM     Yes

## 2019-04-04 NOTE — PHARMACY
Pharmacy- Renal Dose Adjustment    Patient Active Problem List   Diagnosis     Hypovolemic shock (H)     CAD (coronary artery disease)     COPD (chronic obstructive pulmonary disease) (H)     Alcoholism (H)     Hypokalemia     Hyponatremia     Closed fracture of multiple ribs of right side     Prolonged Q-T interval on ECG        Relevant Labs:  Recent Labs   Lab Test 04/04/19  0432 04/03/19  0400   WBC 5.3 5.4   HGB 10.5* 10.1*   * 150        CrCl: >100 ml/min, but over 65 years      Intake/Output Summary (Last 24 hours) at 4/4/2019 1044  Last data filed at 4/4/2019 0904  Gross per 24 hour   Intake --   Output 3065 ml   Net -3065 ml          Over 65 years, per Renal Dose Adjustment Protocol, will ketorolac to 15 mg  prn for remaining doses.    Will continue to follow and make adjustments accordingly. Thank You.    Glo Alexander [facility] ....................  4/4/2019   10:44 AM

## 2019-04-04 NOTE — PROGRESS NOTES
04/04/19 1347   Quick Adds   Type of Visit Initial PT Evaluation   Living Environment   Lives With alone   Living Arrangements house   Home Accessibility stairs within home   Number of Stairs, Within Home, Primary 7   Transportation Anticipated family or friend will provide   Self-Care   Usual Activity Tolerance good   Current Activity Tolerance poor   Equipment Currently Used at Home (no assistive gait device)   Functional Level Prior   Ambulation 0-->independent   Transferring 0-->independent   Toileting 0-->independent   Communication 0-->understands/communicates without difficulty   Cognition 1 - attention or memory deficits   Fall history within last six months yes   Number of times patient has fallen within last six months (multiple)   Which of the above functional risks had a recent onset or change? ambulation;transferring   General Information   Referring Physician Janna   Patient/Family Goals Statement return home when able   Precautions/Limitations fall precautions  (ETOH withdrawal)   Weight-Bearing Status - LLE weight-bearing as tolerated   Weight-Bearing Status - RLE weight-bearing as tolerated   Cognitive Status Examination   Orientation person   Level of Consciousness lethargic/somnolent;agitated   Follows Commands and Answers Questions 100% of the time   Personal Safety and Judgment impaired;impulsive   Memory (difficult to assess at time of PT evaluation )   Pain Assessment   Patient Currently in Pain Yes, see Vital Sign flowsheet  (right side ribs)   Posture    Posture Forward head position;Protracted shoulders   Range of Motion (ROM)   ROM Comment WFL LEs   Strength   Strength Comments WFL LEs, however, patient faitgues easily with activity   Bed Mobility   Bed Mobility Comments moderate assist   Transfer Skills   Transfer Comments moderate assist   Gait   Gait Comments unable to safely, functinoally ambulate at time of PT eval   Balance   Balance Comments poor static and dynamic stability    Sensory Examination   Sensory Perception Comments appears intact to light touch in LEs   Coordination   Coordination Comments appears delayed, but intact   General Therapy Interventions   Planned Therapy Interventions bed mobility training;gait training;transfer training   Clinical Impression   PT Diagnosis impaired mobility   Influenced by the following impairments fatigue and instability   Functional limitations due to impairments safe mobility and decreased functional activity tolerance   Clinical Presentation Evolving/Changing   Clinical Decision Making (Complexity) Low complexity   Therapy Frequency` daily   Predicted Duration of Therapy Intervention (days/wks) 2-3 days   Anticipated Equipment Needs at Discharge walker   Anticipated Discharge Disposition Transitional Care Facility   Risk & Benefits of therapy have been explained Yes   Patient, Family & other staff in agreement with plan of care Yes   Total Evaluation Time   Total Evaluation Time (Minutes) 15

## 2019-04-04 NOTE — PLAN OF CARE
Discharge Planner OT   Patient plan for discharge: pt unable to state due to cognition/lethargy  Current status: Pt lethargic, requesting commode, transferred from bed to commode with mod assist of 2, max assist with toileting and donning brief. Pt able to follow simple commands, brushed hair after shampoo cap with mod assist, max assist to move sit to supine with complaints of pain in rib area.   Barriers to return to prior living situation: pt unable to safely care for self, level of assist needed for mobility and self cares   Recommendations for discharge: SNF but TBA more   Rationale for recommendations: see above        Entered by: Blanca Barton 04/04/2019 1:46 PM

## 2019-04-04 NOTE — PLAN OF CARE
"CIWA assessments have ranged from 10-19. Pt gets very anxious, restless, agitated, and has some tremors. At times she does talk to people that are not present in room. She can state her name, birthday, town she is in, and correct year. Pt is forgetful on the month even after being redirected. Pt is very slow to respond to cues and questions. Speech is understandable.  Due to pt being so restless and continuing to set of the bed alarm, sitter is in room. Pt has been incontinent of urine. Check and change q2hr. On spot check, O2 read 88%. 1 liter of O2 placed and continuous pulse OX on. Pt remains on tele. BP (!) 154/93   Pulse 103   Temp 97.8  F (36.6  C) (Tympanic)   Resp 20   Ht 1.6 m (5' 3\")   Wt 73.5 kg (162 lb 0.6 oz)   SpO2 91%   Breastfeeding? No   BMI 28.70 kg/m   Will continue to assess and intervene appropriately.     Added: Lung sounds dim and pt has a congested, productive cough. Pt has not had an appetite.   "

## 2019-04-04 NOTE — PROGRESS NOTES
"   04/04/19 1300   Quick Adds   Type of Visit Initial Occupational Therapy Evaluation   Living Environment   Lives With alone   Living Arrangements house   Home Accessibility stairs within home   Number of Stairs, Within Home, Primary 7   Self-Care   Usual Activity Tolerance good   Current Activity Tolerance poor   Equipment Currently Used at Home (unable to assess due to cognition )   Functional Level   Ambulation 0-->independent   Transferring 0-->independent   Toileting 0-->independent   Bathing 0-->independent   Dressing 0-->independent   Eating 0-->independent   Communication 0-->understands/communicates without difficulty   Fall history within last six months yes   Cognitive Status Examination   Orientation not oriented to person, place or time   Level of Consciousness lethargic/somnolent   Follows Commands (Cognition) follows one step commands   Memory (unable to assess due to cognition )   Attention Distractible during evaluation   Organization/Problem Solving Problem solving impaired   Executive Function Impulsive  (climbing over bed rails, etc during eval )   Pain Assessment   Patient Currently in Pain Yes, see Vital Sign flowsheet  (\"my ribs\" with bed mobility )   Range of Motion (ROM)   ROM Quick Adds (TBA)   Coordination   Upper Extremity Coordination Left UE impaired;Right UE impaired   Mobility   Bed Mobility Bed mobility skill: Sit to supine   Bed Mobility Skill: Sit to Supine   Level of [place]: Sit/Supine maximum assist (25% patients effort)   Physical Assist/Nonphysical Assist: Sit/Supine 2 persons   Transfer Skill: Bed to Chair/Chair to Bed   Level of [place]: Bed to Chair moderate assist (50% patients effort)   Physical Assist/Nonphysical Assist: Bed to Chair 2 persons   Transfer Skill: Toilet Transfer   Level of [place]: Toilet moderate assist (50% patients effort)   Physical Assist/Nonphysical Assist: Toilet 2 persons   Assistive Device (to bedside commode )   Bathing   Level of " [place] (max assist with shampoo cap )   Lower Body Dressing   Level of [place]: Dress Lower Body maximum assist (25% patients effort)   Physical Assist/Nonphysical Assist: Dress Lower Body 2 person assist   Toileting   Level of [place]: Toilet maximum assist (25% patients effort)   Physical Assist/Nonphysical Assist: Toilet 2 person assist   Clinical Impression   Criteria for Skilled Therapeutic Interventions Met yes, treatment indicated   OT Diagnosis Hypovolemic shock/ETOH w/d    Influenced by the following impairments cognition, weakness, pain   Assessment of Occupational Performance 1-3 Performance Deficits   Identified Performance Deficits mobility, self care, cognition    Clinical Decision Making (Complexity) Moderate complexity   Therapy Frequency daily   Predicted Duration of Therapy Intervention (days/wks) 2-3 days    Anticipated Equipment Needs at Discharge (TBA further )   Anticipated Discharge Disposition (unknown at this time )   Risks and Benefits of Treatment have been explained. Yes   Patient, Family & other staff in agreement with plan of care Yes   Total Evaluation Time   Total Evaluation Time (Minutes) 15

## 2019-04-04 NOTE — PROGRESS NOTES
"[facility]    Hospitalist Progress Note      Assessment & Plan   Natalee Montalvo is a 68 year old female who was admitted on 4/1/2019.     Hypovolemic shock (H)    Assessment: resolved. Suspect hypovolemia as cause due to poor nutrition and intake, but also sepsis is possible. Borderline procalcitonin from 4/2 is higher now, supporting possible present on admission pneumonia.     Plan: Continue zosyn. Supportive care.    Community acquired pneumonia, presumed present on admission   Assessment: Rib fractures, hypoventilation. Possible aspiration with syncope and falls. Chest xray supportive of pneumonia diagnosis.   Plan: Day 3 of zosyn for community acquired pneumonia vs aspiration pneumonia.      Active Problems:    CAD (coronary artery disease)    Assessment: Chronic.  Recent non-ST elevation MI.  Echo shows EF 45-50%    Plan: monitor. Add troponin to this AM labs       COPD (chronic obstructive pulmonary disease) (H)    Assessment: Chronic, stable.  No evidence for an exacerbation at this time    Plan: monitor      Alcohol withdrawal with alcohol dependence     Assessment: CIWA scores in teens    Plan: continue CIWA       Alcoholism (H)    Assessment: Patient is a heavy alcohol user.  Her blood alcohol level is 0.04 on admission.  I am concerned that she could develop alcohol withdrawal.    Plan: CIWA protocol        Hypokalemia    Assessment: Severe, present on admission.  Related to nutrition and nausea vomiting diarrhea. Not improving much yet.    Plan: Replace, replace mag.       Hyponatremia    Assessment: Severe, related to poor intake and \"tea and toast diet\". Improved with normal saline.    Plan: monitor     Hypomagnesemia  Assessment: Present on admission. Persistently low  Plan: Replace       Closed fracture of multiple ribs of right side    Assessment: Patient has 4 rib fractures on the right that are all acute and 2 old fractures on the left.    Plan: PT OT, Toradol for pain " control. Added oxycodone low dose today.       Prolonged Q-T interval on ECG    Assessment: Presumed secondary to hypokalemia and hypomagnesemia.  Still prolonged QTc of 0.517 today.    Plan: Replace potassium and magnesium       Acute on Chronic systolic heart failure     Assessment: aggressive intravenous fluids has fluid overloaded    Plan: Repeat lasix 20 mg IV x1 today     DVT Prophylaxis: Pneumatic Compression Devices  Code Status: Full Code        John Saldivar    Interval History   Delirium now, productive cough.     -Data reviewed today: I reviewed all new labs and imaging results over the last 24 hours. I personally reviewed the chest x-ray image(s) showing r lung infiltrate.    Physical Exam   Temp: 97.8  F (36.6  C) Temp src: Tympanic BP: 140/70 Pulse: 103 Heart Rate: 99 Resp: 20 SpO2: 90 % O2 Device: None (Room air)    Vitals:    04/01/19 1646 04/01/19 2130 04/03/19 1046   Weight: 64.4 kg (142 lb) 72 kg (158 lb 11.7 oz) 73.5 kg (162 lb 0.6 oz)     Vital Signs with Ranges  Temp:  [97.7  F (36.5  C)-98.4  F (36.9  C)] 97.8  F (36.6  C)  Pulse:  [103] 103  Heart Rate:  [] 99  Resp:  [18-20] 20  BP: (132-157)/(68-88) 140/70  SpO2:  [90 %-96 %] 90 %  I/O last 3 completed shifts:  In: 100 [P.O.:100]  Out: 3415 [Urine:3415]    GENERAL: Comfortable, no apparent distress.  CARDIOVASCULAR: tachycardia, regular. No lower extremity edema   RESPIRATORY: Bilateral crackles  GI: non-tender, non-distended, normal bowel sounds.   SKIN: warm periphery, no rashes      Medications     potassium chloride       sodium chloride 10 mL/hr at 04/04/19 0340       sodium chloride 0.9%  1,000 mL Intravenous Once     aspirin  81 mg Oral Daily     atorvastatin  80 mg Oral Daily     clopidogrel  75 mg Oral Daily     folic acid  1 mg Oral Daily     insulin aspart  1-7 Units Subcutaneous TID AC     insulin aspart  1-5 Units Subcutaneous At Bedtime     multivitamin w/minerals  1 tablet Oral Daily     piperacillin-tazobactam   3.375 g Intravenous Q6H     sodium chloride (PF)  3 mL Intracatheter Q8H     vitamin B1  100 mg Oral Daily       Data   Recent Labs   Lab 04/04/19  0432 04/03/19  1405 04/03/19  0400  04/02/19  0400 04/01/19  1938 04/01/19  1720   WBC 5.3  --  5.4  --  5.7  --  6.7   HGB 10.5*  --  10.1*  --  10.5*  --  11.9   *  --  102*  --  99  --  95   *  --  150  --  177  --  219   *  --  132*  --  129*  --  121*   POTASSIUM 3.1* 4.1 2.7*   < > 2.5* 2.7* 2.5*   CHLORIDE 95*  --  97*  --  93*  --  80*   CO2 30  --  29  --  29  --  20*   BUN 3*  --  <2*  --  5*  --  6*   CR 0.38*  --  0.42*  --  0.51*  --  0.56*   ANIONGAP 7  --  6  --  7  --  21*   FELICIANO 8.2*  --  7.6*  --  7.6*  --  8.6   *  --  141*  --  202*  --  149*   ALBUMIN 3.1*  --  3.1*  --  3.3*  --  3.5   PROTTOTAL 5.8*  --  5.4*  --  5.5*  --  6.4   BILITOTAL 0.6  --  0.4  --  0.5  --  0.6   ALKPHOS 83  --  81  --  90  --  102   ALT 23  --  25  --  30  --  35   AST 32  --  34  --  50*  --  65*   TROPI  --   --   --   --  0.035* <0.030 <0.030    < > = values in this interval not displayed.       Recent Results (from the past 24 hour(s))   XR Chest 2 Views    Narrative    PROCEDURE: XR CHEST 2 VW 4/4/2019 9:06 AM    HISTORY: pneumonia    COMPARISONS: 4/1/2019.    TECHNIQUE: 2 views.    FINDINGS: There is been a median sternotomy.    Heart is enlarged but is similar to the prior exam. There is  increasing patchy atelectasis or infiltrate at the right lung base.  There are small bilateral pleural effusions with blunting of posterior  costophrenic angles.    There are posterior rib fractures on the right with mild displacement.  There does not appear to be significant callus formation. There has  been previous lateral clavicle excision on the right with elevation of  the lateral clavicle relative to the acromion process.         Impression    IMPRESSION: Right-sided rib fractures with increasing patchy density  in the right perihilar region and  right lung base.    JULIETA OBRIEN MD

## 2019-04-04 NOTE — PROGRESS NOTES
Tele tech called and said patient's HR got up to 170. Pt was sitting at edge of bed. Pt stated to feel dizzy with moving and can feel her heart pound. MD aware and EKG ordered.

## 2019-04-04 NOTE — PROGRESS NOTES
Administered ativan 2mg for restlessness and anxiety, per CWA.  Patient was anxiously crawling out of bed unsafely with no redirection.  Ghazala Trevino RN on 4/4/2019 at 3:25 PM

## 2019-04-05 ENCOUNTER — APPOINTMENT (OUTPATIENT)
Dept: PHYSICAL THERAPY | Facility: OTHER | Age: 69
DRG: 871 | End: 2019-04-05
Payer: MEDICARE

## 2019-04-05 ENCOUNTER — APPOINTMENT (OUTPATIENT)
Dept: OCCUPATIONAL THERAPY | Facility: OTHER | Age: 69
DRG: 871 | End: 2019-04-05
Payer: MEDICARE

## 2019-04-05 LAB
ALBUMIN SERPL-MCNC: 3 G/DL (ref 3.5–5.7)
ALP SERPL-CCNC: 84 U/L (ref 34–104)
ALT SERPL W P-5'-P-CCNC: 21 U/L (ref 7–52)
ANION GAP SERPL CALCULATED.3IONS-SCNC: 6 MMOL/L (ref 3–14)
AST SERPL W P-5'-P-CCNC: 30 U/L (ref 13–39)
BILIRUB SERPL-MCNC: 0.6 MG/DL (ref 0.3–1)
BUN SERPL-MCNC: 3 MG/DL (ref 7–25)
CALCIUM SERPL-MCNC: 8.3 MG/DL (ref 8.6–10.3)
CHLORIDE SERPL-SCNC: 97 MMOL/L (ref 98–107)
CO2 SERPL-SCNC: 29 MMOL/L (ref 21–31)
CREAT SERPL-MCNC: 0.42 MG/DL (ref 0.6–1.2)
ERYTHROCYTE [DISTWIDTH] IN BLOOD BY AUTOMATED COUNT: 14.3 % (ref 10–15)
GFR SERPL CREATININE-BSD FRML MDRD: >90 ML/MIN/{1.73_M2}
GLUCOSE SERPL-MCNC: 118 MG/DL (ref 70–105)
HCT VFR BLD AUTO: 31.9 % (ref 35–47)
HGB BLD-MCNC: 10.7 G/DL (ref 11.7–15.7)
MAGNESIUM SERPL-MCNC: 1.8 MG/DL (ref 1.9–2.7)
MCH RBC QN AUTO: 34.2 PG (ref 26.5–33)
MCHC RBC AUTO-ENTMCNC: 33.5 G/DL (ref 31.5–36.5)
MCV RBC AUTO: 102 FL (ref 78–100)
PLATELET # BLD AUTO: 126 10E9/L (ref 150–450)
POTASSIUM SERPL-SCNC: 3.7 MMOL/L (ref 3.5–5.1)
PROT SERPL-MCNC: 5.6 G/DL (ref 6.4–8.9)
RBC # BLD AUTO: 3.13 10E12/L (ref 3.8–5.2)
SODIUM SERPL-SCNC: 132 MMOL/L (ref 134–144)
WBC # BLD AUTO: 4.9 10E9/L (ref 4–11)

## 2019-04-05 PROCEDURE — 25000128 H RX IP 250 OP 636: Performed by: INTERNAL MEDICINE

## 2019-04-05 PROCEDURE — 36415 COLL VENOUS BLD VENIPUNCTURE: CPT | Performed by: INTERNAL MEDICINE

## 2019-04-05 PROCEDURE — 99233 SBSQ HOSP IP/OBS HIGH 50: CPT | Performed by: FAMILY MEDICINE

## 2019-04-05 PROCEDURE — 97530 THERAPEUTIC ACTIVITIES: CPT | Mod: GP

## 2019-04-05 PROCEDURE — 80053 COMPREHEN METABOLIC PANEL: CPT | Performed by: INTERNAL MEDICINE

## 2019-04-05 PROCEDURE — 83735 ASSAY OF MAGNESIUM: CPT | Performed by: INTERNAL MEDICINE

## 2019-04-05 PROCEDURE — 97530 THERAPEUTIC ACTIVITIES: CPT | Mod: GO | Performed by: OCCUPATIONAL THERAPIST

## 2019-04-05 PROCEDURE — 85027 COMPLETE CBC AUTOMATED: CPT | Performed by: INTERNAL MEDICINE

## 2019-04-05 PROCEDURE — 25000128 H RX IP 250 OP 636: Performed by: FAMILY MEDICINE

## 2019-04-05 PROCEDURE — 97116 GAIT TRAINING THERAPY: CPT | Mod: GP

## 2019-04-05 PROCEDURE — 25000132 ZZH RX MED GY IP 250 OP 250 PS 637: Mod: GY | Performed by: INTERNAL MEDICINE

## 2019-04-05 PROCEDURE — 25000132 ZZH RX MED GY IP 250 OP 250 PS 637: Mod: GY | Performed by: FAMILY MEDICINE

## 2019-04-05 PROCEDURE — 97535 SELF CARE MNGMENT TRAINING: CPT | Mod: GO | Performed by: OCCUPATIONAL THERAPIST

## 2019-04-05 PROCEDURE — 12000000 ZZH R&B MED SURG/OB

## 2019-04-05 PROCEDURE — A9270 NON-COVERED ITEM OR SERVICE: HCPCS | Mod: GY | Performed by: FAMILY MEDICINE

## 2019-04-05 PROCEDURE — A9270 NON-COVERED ITEM OR SERVICE: HCPCS | Mod: GY | Performed by: INTERNAL MEDICINE

## 2019-04-05 RX ADMIN — KETOROLAC TROMETHAMINE 15 MG: 15 INJECTION, SOLUTION INTRAMUSCULAR; INTRAVENOUS at 21:19

## 2019-04-05 RX ADMIN — ATORVASTATIN CALCIUM 80 MG: 40 TABLET, FILM COATED ORAL at 11:18

## 2019-04-05 RX ADMIN — TAZOBACTAM SODIUM AND PIPERACILLIN SODIUM 3.38 G: 375; 3 INJECTION, SOLUTION INTRAVENOUS at 18:33

## 2019-04-05 RX ADMIN — THIAMINE HCL TAB 100 MG 100 MG: 100 TAB at 11:19

## 2019-04-05 RX ADMIN — LORAZEPAM 2 MG: 2 INJECTION INTRAMUSCULAR; INTRAVENOUS at 15:05

## 2019-04-05 RX ADMIN — ASPIRIN 81 MG 81 MG: 81 TABLET ORAL at 11:21

## 2019-04-05 RX ADMIN — TAZOBACTAM SODIUM AND PIPERACILLIN SODIUM 3.38 G: 375; 3 INJECTION, SOLUTION INTRAVENOUS at 10:45

## 2019-04-05 RX ADMIN — LORAZEPAM 1 MG: 1 TABLET ORAL at 21:18

## 2019-04-05 RX ADMIN — MULTIPLE VITAMINS W/ MINERALS TAB 1 TABLET: TAB at 11:21

## 2019-04-05 RX ADMIN — ACETAMINOPHEN 650 MG: 325 TABLET, FILM COATED ORAL at 11:33

## 2019-04-05 RX ADMIN — FOLIC ACID 1 MG: 1 TABLET ORAL at 11:19

## 2019-04-05 RX ADMIN — LORAZEPAM 1 MG: 1 TABLET ORAL at 11:32

## 2019-04-05 RX ADMIN — TAZOBACTAM SODIUM AND PIPERACILLIN SODIUM 3.38 G: 375; 3 INJECTION, SOLUTION INTRAVENOUS at 03:38

## 2019-04-05 RX ADMIN — LORAZEPAM 1 MG: 2 INJECTION INTRAMUSCULAR; INTRAVENOUS at 13:07

## 2019-04-05 RX ADMIN — CLOPIDOGREL 75 MG: 75 TABLET, FILM COATED ORAL at 11:21

## 2019-04-05 ASSESSMENT — ACTIVITIES OF DAILY LIVING (ADL)
ADLS_ACUITY_SCORE: 11

## 2019-04-05 ASSESSMENT — MIFFLIN-ST. JEOR: SCORE: 1187.13

## 2019-04-05 NOTE — PLAN OF CARE
Discharge Planner PT   Patient plan for discharge: return home when able  Current status: minimal assist to CGA for mobility using a Fww for stability  Barriers to return to prior living situation: fatigue, decreased activity tolerance  Recommendations for discharge: continue PT  Rationale for recommendations: to promote strength, stability and safe, independent mobility       Entered by: Cristi Alexander 04/05/2019 3:50 PM

## 2019-04-05 NOTE — PLAN OF CARE
Discharge Planner OT   Patient plan for discharge: pt did not state   Current status: Pt remains lethargic but more alert today and agreeable to attempt ambulation in the hallway. Pt ambulated approximately 20 feet out in to hallway and back using FWW, slowly, but with CGA of 1-2. Pt completed hair combing with set up only after shampoo cap and needed min assist to assist L/E's in to bed.   Barriers to return to prior living situation: level of assist needed   Recommendations for discharge: with with assist vs SNF for short term rehab to build strength needed for independent living.   Rationale for recommendations: see above.        Entered by: Blanca Barton 04/05/2019 2:32 PM

## 2019-04-05 NOTE — PLAN OF CARE
"Patient has had episodes of intermittent restlessness today, CIWA scoring ranging from 2-10, total Ativan administered 3 mg. Alert but is confused, continually asking to be taken home by visitors, when asked if needing anything by staff states \"I just want to go home\". Family members present throughout the day.   Up to BSC & BRP with assist x 2, unsteady on feet. Fall risk precautions in place, sitter in place until 1600 then RN hourly rounding.   Patient continues to be tachycardic throughout the day, rate into mid 120's with activity. Telemetry in place.  Ongoing monitoring with interventions as needed.  Enedina Morales, RN on 4/5/2019 at 6:54 PM    "

## 2019-04-05 NOTE — PROGRESS NOTES
:    Notified that patient's son is here asking to speak with .      Met with patient's son Allan privately.  He shared concerns about patient's alcohol use.  They have been trying to get patient to go to treatment.  He stated she was recently scheduled to go to cd treatment and backed out right before her appointment.  He would like her offered cd treatment resources and requested a list of area cd resources.  He was provided with a list of area resources.  Also discussed al-anon for son.    JONATHON Villa on 4/5/2019 at 3:44 PM       Addendum:    Attempted to meet with patient to offer cd services/resources.  Patient was sleeping and  unable to wake patient enough to discuss.   Anticipated discharge home several days.    JONATHON Villa on 4/5/2019 at 4:08 PM

## 2019-04-05 NOTE — PROGRESS NOTES
"[facility]    Hospitalist Progress Note      Assessment & Plan   Natalee Montalvo is a 68 year old female who was admitted on 4/1/2019.     Hypovolemic shock (H). resolved. Suspect hypovolemia as cause due to poor nutrition and intake, but also sepsis is possible. Borderline procalcitonin from 4/2 is higher now, supporting possible present on admission pneumonia.   -Continue zosyn. Supportive care.    Community acquired pneumonia, presumed present on admission   Assessment: Rib fractures, hypoventilation. Possible aspiration with syncope and falls. Chest xray supportive of pneumonia diagnosis.   Plan: Day 4 of zosyn for community acquired pneumonia vs aspiration pneumonia.        CAD (coronary artery disease)    Assessment: Chronic.  Recent non-ST elevation MI.  Echo shows EF 45-50%    Plan: monitor. Add troponin to this AM labs       COPD (chronic obstructive pulmonary disease) (H)    Assessment: Chronic, stable.  No evidence for an exacerbation at this time    Plan: monitor      Alcohol withdrawal with alcohol dependence     Assessment: CIWA scores in teens    Plan: continue CIWA and ativan       Hypokalemia, resolved. present on admission.  Related to nutrition and nausea vomiting diarrhea.     Plan: monitor and Replace, replace mag.       Hyponatremia    Assessment: Severe, related to poor intake and \"tea and toast diet\". Improved with normal saline.    Plan: monitor     Hypomagnesemia  Assessment: Present on admission. Persistently low  Plan: Replace       Closed fracture of multiple ribs of right side.  Patient has 4 rib fractures on the right that are all acute and 2 old fractures on the left.  -PT OT, Toradol for pain control. Added oxycodone low dose today.       Prolonged Q-T interval on ECG    Assessment: Presumed secondary to hypokalemia and hypomagnesemia.  Still prolonged QTc of 0.517 today.   -monitor and Replace potassium and magnesium       Acute on Chronic systolic heart " "failure.  aggressive intravenous fluids on admission, with some fluid overload. Given lasix last 2 days. Appears euvolemic today.   -monitor. Lasix prn.   -monitor  I and O     DVT Prophylaxis: Pneumatic Compression Devices  Code Status: Full Code    Marivel Beavers    Interval History   More clear on my exam but has sitter at bedside. Wants to go home \"ASAP\". Discussed ongoing need for hospitalization and she is agreeable for now. Getting ativan for withdrawal. Does not have good appetite. Sleep was \"so-so\". No other new acute concerns.     -Data reviewed today: I reviewed all new labs and imaging results over the last 24 hours. I personally reviewed the chest x-ray image(s) showing r lung infiltrate.    Physical Exam   Temp: 97.5  F (36.4  C) Temp src: Tympanic BP: 137/86 Pulse: 107 Heart Rate: 92 Resp: 20 SpO2: 91 % O2 Device: None (Room air) Oxygen Delivery: 2 LPM  Vitals:    04/01/19 2130 04/03/19 1046 04/05/19 0459   Weight: 72 kg (158 lb 11.7 oz) 73.5 kg (162 lb 0.6 oz) 69.8 kg (153 lb 14.1 oz)     Vital Signs with Ranges  Temp:  [97.5  F (36.4  C)-98.6  F (37  C)] 97.5  F (36.4  C)  Pulse:  [107-110] 107  Heart Rate:  [] 92  Resp:  [16-24] 20  BP: (112-154)/(68-93) 137/86  SpO2:  [88 %-96 %] 91 %  I/O last 3 completed shifts:  In: 548 [I.V.:548]  Out: 675 [Urine:675]    GENERAL: Comfortable, no apparent distress.  CARDIOVASCULAR: regular. No murmur. Normal s1 and s2. No lower extremity edema   RESPIRATORY: Bilateral crackles  GI: non-tender, non-distended, normal bowel sounds.   SKIN: warm periphery, no rashes. Significant bruising of arms  in several stages of healing.       Medications     potassium chloride       sodium chloride 10 mL/hr at 04/04/19 0340       sodium chloride 0.9%  1,000 mL Intravenous Once     aspirin  81 mg Oral Daily     atorvastatin  80 mg Oral Daily     clopidogrel  75 mg Oral Daily     folic acid  1 mg Oral Daily     insulin aspart  1-7 Units Subcutaneous TID AC     insulin " aspart  1-5 Units Subcutaneous At Bedtime     multivitamin w/minerals  1 tablet Oral Daily     piperacillin-tazobactam  3.375 g Intravenous Q6H     sodium chloride (PF)  3 mL Intracatheter Q8H     vitamin B1  100 mg Oral Daily       Data   Recent Labs   Lab 04/05/19  0432 04/04/19  1343 04/04/19  0432  04/03/19  0400  04/02/19  0400 04/01/19  1938 04/01/19  1720   WBC 4.9  --  5.3  --  5.4  --  5.7  --  6.7   HGB 10.7*  --  10.5*  --  10.1*  --  10.5*  --  11.9   *  --  101*  --  102*  --  99  --  95   *  --  143*  --  150  --  177  --  219   *  --  132*  --  132*  --  129*  --  121*   POTASSIUM 3.7 4.1 3.1*   < > 2.7*   < > 2.5* 2.7* 2.5*   CHLORIDE 97*  --  95*  --  97*  --  93*  --  80*   CO2 29  --  30  --  29  --  29  --  20*   BUN 3*  --  3*  --  <2*  --  5*  --  6*   CR 0.42*  --  0.38*  --  0.42*  --  0.51*  --  0.56*   ANIONGAP 6  --  7  --  6  --  7  --  21*   FELICIANO 8.3*  --  8.2*  --  7.6*  --  7.6*  --  8.6   *  --  131*  --  141*  --  202*  --  149*   ALBUMIN 3.0*  --  3.1*  --  3.1*  --  3.3*  --  3.5   PROTTOTAL 5.6*  --  5.8*  --  5.4*  --  5.5*  --  6.4   BILITOTAL 0.6  --  0.6  --  0.4  --  0.5  --  0.6   ALKPHOS 84  --  83  --  81  --  90  --  102   ALT 21  --  23  --  25  --  30  --  35   AST 30  --  32  --  34  --  50*  --  65*   TROPI  --   --   --   --   --   --  0.035* <0.030 <0.030    < > = values in this interval not displayed.       Recent Results (from the past 24 hour(s))   XR Chest 2 Views    Narrative    PROCEDURE: XR CHEST 2 VW 4/4/2019 9:06 AM    HISTORY: pneumonia    COMPARISONS: 4/1/2019.    TECHNIQUE: 2 views.    FINDINGS: There is been a median sternotomy.    Heart is enlarged but is similar to the prior exam. There is  increasing patchy atelectasis or infiltrate at the right lung base.  There are small bilateral pleural effusions with blunting of posterior  costophrenic angles.    There are posterior rib fractures on the right with mild  displacement.  There does not appear to be significant callus formation. There has  been previous lateral clavicle excision on the right with elevation of  the lateral clavicle relative to the acromion process.         Impression    IMPRESSION: Right-sided rib fractures with increasing patchy density  in the right perihilar region and right lung base.    JULIETA OBRIEN MD

## 2019-04-06 ENCOUNTER — APPOINTMENT (OUTPATIENT)
Dept: OCCUPATIONAL THERAPY | Facility: OTHER | Age: 69
DRG: 871 | End: 2019-04-06
Payer: MEDICARE

## 2019-04-06 ENCOUNTER — APPOINTMENT (OUTPATIENT)
Dept: PHYSICAL THERAPY | Facility: OTHER | Age: 69
DRG: 871 | End: 2019-04-06
Payer: MEDICARE

## 2019-04-06 VITALS
OXYGEN SATURATION: 94 % | HEART RATE: 94 BPM | SYSTOLIC BLOOD PRESSURE: 135 MMHG | TEMPERATURE: 97.9 F | BODY MASS INDEX: 27.07 KG/M2 | RESPIRATION RATE: 22 BRPM | HEIGHT: 63 IN | DIASTOLIC BLOOD PRESSURE: 98 MMHG | WEIGHT: 152.78 LBS

## 2019-04-06 LAB
ANION GAP SERPL CALCULATED.3IONS-SCNC: 8 MMOL/L (ref 3–14)
BUN SERPL-MCNC: 6 MG/DL (ref 7–25)
CALCIUM SERPL-MCNC: 8.7 MG/DL (ref 8.6–10.3)
CHLORIDE SERPL-SCNC: 98 MMOL/L (ref 98–107)
CO2 SERPL-SCNC: 25 MMOL/L (ref 21–31)
CREAT SERPL-MCNC: 0.41 MG/DL (ref 0.6–1.2)
GFR SERPL CREATININE-BSD FRML MDRD: >90 ML/MIN/{1.73_M2}
GLUCOSE SERPL-MCNC: 110 MG/DL (ref 70–105)
MAGNESIUM SERPL-MCNC: 1.6 MG/DL (ref 1.9–2.7)
POTASSIUM SERPL-SCNC: 3.4 MMOL/L (ref 3.5–5.1)
SODIUM SERPL-SCNC: 131 MMOL/L (ref 134–144)

## 2019-04-06 PROCEDURE — 83735 ASSAY OF MAGNESIUM: CPT | Performed by: FAMILY MEDICINE

## 2019-04-06 PROCEDURE — 25000132 ZZH RX MED GY IP 250 OP 250 PS 637: Mod: GY | Performed by: INTERNAL MEDICINE

## 2019-04-06 PROCEDURE — 99239 HOSP IP/OBS DSCHRG MGMT >30: CPT | Performed by: FAMILY MEDICINE

## 2019-04-06 PROCEDURE — 36415 COLL VENOUS BLD VENIPUNCTURE: CPT | Performed by: FAMILY MEDICINE

## 2019-04-06 PROCEDURE — 97116 GAIT TRAINING THERAPY: CPT | Mod: GP | Performed by: PHYSICAL THERAPIST

## 2019-04-06 PROCEDURE — 25000128 H RX IP 250 OP 636: Performed by: INTERNAL MEDICINE

## 2019-04-06 PROCEDURE — 25000132 ZZH RX MED GY IP 250 OP 250 PS 637: Mod: GY | Performed by: FAMILY MEDICINE

## 2019-04-06 PROCEDURE — A9270 NON-COVERED ITEM OR SERVICE: HCPCS | Mod: GY | Performed by: INTERNAL MEDICINE

## 2019-04-06 PROCEDURE — 97530 THERAPEUTIC ACTIVITIES: CPT | Mod: GO | Performed by: OCCUPATIONAL THERAPIST

## 2019-04-06 PROCEDURE — A9270 NON-COVERED ITEM OR SERVICE: HCPCS | Mod: GY | Performed by: FAMILY MEDICINE

## 2019-04-06 PROCEDURE — 80048 BASIC METABOLIC PNL TOTAL CA: CPT | Performed by: FAMILY MEDICINE

## 2019-04-06 PROCEDURE — 97530 THERAPEUTIC ACTIVITIES: CPT | Mod: GP | Performed by: PHYSICAL THERAPIST

## 2019-04-06 RX ORDER — LANOLIN ALCOHOL/MO/W.PET/CERES
100 CREAM (GRAM) TOPICAL DAILY
Qty: 100 TABLET | Refills: 0 | Status: ON HOLD | OUTPATIENT
Start: 2019-04-06 | End: 2024-01-18

## 2019-04-06 RX ORDER — LANOLIN ALCOHOL/MO/W.PET/CERES
100 CREAM (GRAM) TOPICAL DAILY
Qty: 100 TABLET | Refills: 0 | Status: SHIPPED | OUTPATIENT
Start: 2019-04-06 | End: 2019-04-06

## 2019-04-06 RX ORDER — DOXYCYCLINE HYCLATE 100 MG
100 TABLET ORAL 2 TIMES DAILY
Qty: 6 TABLET | Refills: 0 | Status: ON HOLD | OUTPATIENT
Start: 2019-04-06 | End: 2024-01-18

## 2019-04-06 RX ORDER — FOLIC ACID 1 MG/1
1 TABLET ORAL DAILY
Qty: 100 TABLET | Refills: 0 | Status: SHIPPED | OUTPATIENT
Start: 2019-04-06 | End: 2019-04-06

## 2019-04-06 RX ORDER — SACCHAROMYCES BOULARDII 250 MG
250 CAPSULE ORAL 2 TIMES DAILY
Qty: 30 CAPSULE | Refills: 0 | Status: ON HOLD | OUTPATIENT
Start: 2019-04-06 | End: 2024-01-18

## 2019-04-06 RX ORDER — SACCHAROMYCES BOULARDII 250 MG
250 CAPSULE ORAL 2 TIMES DAILY
Qty: 30 CAPSULE | Refills: 0 | Status: SHIPPED | OUTPATIENT
Start: 2019-04-06 | End: 2019-04-06

## 2019-04-06 RX ORDER — DOXYCYCLINE HYCLATE 100 MG
100 TABLET ORAL 2 TIMES DAILY
Qty: 6 TABLET | Refills: 0 | Status: SHIPPED | OUTPATIENT
Start: 2019-04-06 | End: 2019-04-06

## 2019-04-06 RX ORDER — FOLIC ACID 1 MG/1
1 TABLET ORAL DAILY
Qty: 100 TABLET | Refills: 0 | Status: SHIPPED | OUTPATIENT
Start: 2019-04-06

## 2019-04-06 RX ADMIN — FOLIC ACID 1 MG: 1 TABLET ORAL at 10:06

## 2019-04-06 RX ADMIN — LORAZEPAM 1 MG: 1 TABLET ORAL at 11:48

## 2019-04-06 RX ADMIN — TAZOBACTAM SODIUM AND PIPERACILLIN SODIUM 3.38 G: 375; 3 INJECTION, SOLUTION INTRAVENOUS at 12:04

## 2019-04-06 RX ADMIN — CLOPIDOGREL 75 MG: 75 TABLET, FILM COATED ORAL at 10:06

## 2019-04-06 RX ADMIN — MULTIPLE VITAMINS W/ MINERALS TAB 1 TABLET: TAB at 10:06

## 2019-04-06 RX ADMIN — INSULIN ASPART 1 UNITS: 100 INJECTION, SOLUTION INTRAVENOUS; SUBCUTANEOUS at 12:10

## 2019-04-06 RX ADMIN — ASPIRIN 81 MG 81 MG: 81 TABLET ORAL at 10:07

## 2019-04-06 RX ADMIN — TAZOBACTAM SODIUM AND PIPERACILLIN SODIUM 3.38 G: 375; 3 INJECTION, SOLUTION INTRAVENOUS at 06:40

## 2019-04-06 RX ADMIN — ATORVASTATIN CALCIUM 80 MG: 40 TABLET, FILM COATED ORAL at 10:08

## 2019-04-06 RX ADMIN — KETOROLAC TROMETHAMINE 15 MG: 15 INJECTION, SOLUTION INTRAMUSCULAR; INTRAVENOUS at 12:12

## 2019-04-06 RX ADMIN — LORAZEPAM 1 MG: 1 TABLET ORAL at 00:32

## 2019-04-06 RX ADMIN — THIAMINE HCL TAB 100 MG 100 MG: 100 TAB at 10:05

## 2019-04-06 RX ADMIN — LORAZEPAM 1 MG: 1 TABLET ORAL at 13:54

## 2019-04-06 RX ADMIN — ACETAMINOPHEN 650 MG: 325 TABLET, FILM COATED ORAL at 00:15

## 2019-04-06 RX ADMIN — TAZOBACTAM SODIUM AND PIPERACILLIN SODIUM 3.38 G: 375; 3 INJECTION, SOLUTION INTRAVENOUS at 00:16

## 2019-04-06 ASSESSMENT — ACTIVITIES OF DAILY LIVING (ADL)
ADLS_ACUITY_SCORE: 18
ADLS_ACUITY_SCORE: 12

## 2019-04-06 ASSESSMENT — MIFFLIN-ST. JEOR: SCORE: 1182.13

## 2019-04-06 NOTE — PLAN OF CARE
Patient reports improvement with continued desire to be discharged, was able to get up with assist and use walker for transfer with ambulation. Continues to have some anxiousness regarding admission & right sided pain due to rib fracture. Has been able to use medication for comfort & symptom control.    Lungs continue to be diminished at bases with improved movement in other lobes. Congested productive cough with clear sputum.     Family present today, agreement by patient and family for discharge to [facility] for additional PT/OT strengthening.  Enedina Morales, RN on 4/6/2019 at 2:35 PM

## 2019-04-06 NOTE — PROGRESS NOTES
"[facility]    Hospitalist Progress Note      Assessment & Plan   Natalee Montalvo is a 68 year old female who was admitted on 4/1/2019.     Hypovolemic shock (H). resolved. Suspect hypovolemia as cause due to poor nutrition and intake, but also sepsis is possible. Borderline procalcitonin from 4/2 is higher now, supporting possible present on admission pneumonia.   -Continue zosyn. Supportive care.  -Initially we had planned to consider discharge home but after discussion with her family patient has now agreed to go to transitional care facility and we are working on placement.    Community acquired pneumonia, presumed present on admission   Assessment: Rib fractures, hypoventilation. Possible aspiration with syncope and falls. Chest xray supportive of pneumonia diagnosis.   Plan: Day 5 of zosyn for community acquired pneumonia vs aspiration pneumonia.        CAD (coronary artery disease)    Assessment: Chronic.  Recent non-ST elevation MI.  Echo shows EF 45-50%    Plan: monitor. Add troponin to this AM labs       COPD (chronic obstructive pulmonary disease) (H)    Assessment: Chronic, stable.  No evidence for an exacerbation at this time    Plan: monitor      Alcohol withdrawal with alcohol dependence.  Stable back to her baseline mental status.  No acute or current symptoms of withdrawal.       Hypokalemia, resolved. present on admission.  Related to nutrition and nausea vomiting diarrhea.     Plan: monitor and Replace, replace mag.       Hyponatremia    Assessment: Severe, related to poor intake and \"tea and toast diet\". Improved with normal saline.    Plan: monitor     Hypomagnesemia  Assessment: Present on admission. Persistently low  Plan: Replace       Closed fracture of multiple ribs of right side.  Patient has 4 rib fractures on the right that are all acute and 2 old fractures on the left.  -PT OT, Toradol for pain control. Added oxycodone low dose today.       Prolonged Q-T interval " on ECG    -EKG in AM   -monitor and Replace potassium and magnesium       Acute on Chronic systolic heart failure.  aggressive intravenous fluids on admission, with some fluid overload. Given lasix last 2 days. Appears euvolemic today.   -monitor. Lasix prn.   -monitor  I and O     DVT Prophylaxis: Pneumatic Compression Devices  Code Status: Full Code    Marivel Beavers    Interval History   More clear on my exam but has sitter at bedside.  Initially met with her on her own and she had 1 to go home.  She had not been agreeable to TCU but after her sisters arrived and discussed her concerns with her she then became agreeable to TCU.  She still quite weak working with PT and OT.  Denies cough, shortness of breath, fever, chills.  No current issues with withdrawal.  No tremors.  Continues to have diminished appetite but this is baseline for her.  We have encouraged her to increase her protein intake and food intake to help with her strength.    -Data reviewed today: I reviewed all new labs and imaging results over the last 24 hours. I personally reviewed the chest x-ray image(s) showing r lung infiltrate.    Physical Exam   Temp: 97.9  F (36.6  C) Temp src: Tympanic BP: (!) 135/98 Pulse: 94 Heart Rate: 94 Resp: 22 SpO2: 94 % O2 Device: None (Room air)    Vitals:    04/03/19 1046 04/05/19 0459 04/06/19 0400   Weight: 73.5 kg (162 lb 0.6 oz) 69.8 kg (153 lb 14.1 oz) 69.3 kg (152 lb 12.5 oz)     Vital Signs with Ranges  Temp:  [97.1  F (36.2  C)-98.7  F (37.1  C)] 97.9  F (36.6  C)  Pulse:  [] 94  Heart Rate:  [] 94  Resp:  [20-24] 22  BP: (111-139)/(65-98) 135/98  SpO2:  [86 %-94 %] 94 %  I/O last 3 completed shifts:  In: 599 [P.O.:340; I.V.:259]  Out: 350 [Urine:350]    GENERAL: Comfortable, no apparent distress.  CARDIOVASCULAR: regular. No murmur. Normal s1 and s2. No lower extremity edema   RESPIRATORY: Bilateral crackles  GI: non-tender, non-distended, normal bowel sounds.   SKIN: warm periphery, no  rashes. Significant bruising of arms  in several stages of healing.       Medications     potassium chloride       sodium chloride 10 mL/hr at 04/04/19 0340       sodium chloride 0.9%  1,000 mL Intravenous Once     aspirin  81 mg Oral Daily     atorvastatin  80 mg Oral Daily     clopidogrel  75 mg Oral Daily     folic acid  1 mg Oral Daily     insulin aspart  1-7 Units Subcutaneous TID AC     insulin aspart  1-5 Units Subcutaneous At Bedtime     multivitamin w/minerals  1 tablet Oral Daily     piperacillin-tazobactam  3.375 g Intravenous Q6H     sodium chloride (PF)  3 mL Intracatheter Q8H       Data   Recent Labs   Lab 04/06/19  0505 04/05/19  0432 04/04/19  1343 04/04/19  0432  04/03/19  0400  04/02/19  0400 04/01/19  1938 04/01/19  1720   WBC  --  4.9  --  5.3  --  5.4  --  5.7  --  6.7   HGB  --  10.7*  --  10.5*  --  10.1*  --  10.5*  --  11.9   MCV  --  102*  --  101*  --  102*  --  99  --  95   PLT  --  126*  --  143*  --  150  --  177  --  219   * 132*  --  132*  --  132*  --  129*  --  121*   POTASSIUM 3.4* 3.7 4.1 3.1*   < > 2.7*   < > 2.5* 2.7* 2.5*   CHLORIDE 98 97*  --  95*  --  97*  --  93*  --  80*   CO2 25 29  --  30  --  29  --  29  --  20*   BUN 6* 3*  --  3*  --  <2*  --  5*  --  6*   CR 0.41* 0.42*  --  0.38*  --  0.42*  --  0.51*  --  0.56*   ANIONGAP 8 6  --  7  --  6  --  7  --  21*   FELICIANO 8.7 8.3*  --  8.2*  --  7.6*  --  7.6*  --  8.6   * 118*  --  131*  --  141*  --  202*  --  149*   ALBUMIN  --  3.0*  --  3.1*  --  3.1*  --  3.3*  --  3.5   PROTTOTAL  --  5.6*  --  5.8*  --  5.4*  --  5.5*  --  6.4   BILITOTAL  --  0.6  --  0.6  --  0.4  --  0.5  --  0.6   ALKPHOS  --  84  --  83  --  81  --  90  --  102   ALT  --  21  --  23  --  25  --  30  --  35   AST  --  30  --  32  --  34  --  50*  --  65*   TROPI  --   --   --   --   --   --   --  0.035* <0.030 <0.030    < > = values in this interval not displayed.       No results found for this or any previous visit (from the past 24  hour(s)).

## 2019-04-06 NOTE — PLAN OF CARE
Pt admitted 4/1/19 with hypovolemic shock. Alert to self and place, forgetful with slow speech. Pt reports severe right rib cage pain. PRN tylenol and toradol given with position changes for comfort. Pain relieved, sleeping at this time.    Standing in bathroom with walker pt had a tremulous episode, whole body shaking for about 15 seconds. Pt assisted to sit back down. States she feels fine. Treated as tremor for CIWA assessment. No further episodes.     Congested, productive cough. LS diminished to R side.    Scored 8 and 9 for CIWA protocol. PO ativan effective. Pt has poor insight into situation, asking to discharge home. Does not want to be here. Large purple bruises all over body, present on admission. Unsteady on feet. Fall precautions in place.     0630- slept rest of NOC with no complaints of pain or ETOH withdrawal symptoms.

## 2019-04-06 NOTE — PLAN OF CARE
Occupational Therapy Discharge Summary    Reason for therapy discharge:    Discharged to home with home therapy.    Progress towards therapy goal(s). See goals on Care Plan in [facility] electronic health record for goal details.  Goals partially met.  Barriers to achieving goals:   discharge from facility.    Therapy recommendation(s):    Continued therapy is recommended.  Rationale/Recommendations:  pt has very low activity tolerance and endurance.  Would benefit from home strengthening and safety/ADL assessment.

## 2019-04-06 NOTE — DISCHARGE INSTRUCTIONS
Try to eat plenty of protein  Avoid alcohol  Use 4 wheeled walker to ambulate  I will have home health care see you next week for physical therapy, occupational therapy, and a nurse.  Continue taking her medications and antibiotics  See your regular doctor by the end of the week

## 2019-04-06 NOTE — PHARMACY - DISCHARGE MEDICATION RECONCILIATION
[facility]  Part of [facility]  [address] [place] [address]    April 6, 2019    Dear Pharmacist,    Your customer, Natalee Montalvo, born on 1950, was recently discharged from [facility] to [facility] (new resident).  We have updated her medication list and want to alert you to the following:       Review of your medicines      START taking      Dose / Directions   doxycycline hyclate 100 MG tablet  Commonly known as:  VIBRA-TABS  Used for:  Chronic obstructive pulmonary disease with acute exacerbation (H)      Dose:  100 mg  Take 1 tablet (100 mg) by mouth 2 times daily  Quantity:  6 tablet  Refills:  0     folic acid 1 MG tablet  Commonly known as:  FOLVITE  Used for:  Chronic obstructive pulmonary disease with acute exacerbation (H)      Dose:  1 mg  Take 1 tablet (1 mg) by mouth daily  Quantity:  100 tablet  Refills:  0     order for DME  Used for:  Chronic obstructive pulmonary disease with acute exacerbation (H)      Equipment being ordered: Walker Wheels ()  Treatment Diagnosis: weakness. COPD  Quantity:  1 each  Refills:  0     saccharomyces boulardii 250 MG capsule  Commonly known as:  FLORASTOR  Used for:  Chronic obstructive pulmonary disease with acute exacerbation (H)      Dose:  250 mg  Take 1 capsule (250 mg) by mouth 2 times daily  Quantity:  30 capsule  Refills:  0     vitamin B1 100 MG tablet  Commonly known as:  THIAMINE  Used for:  Chronic obstructive pulmonary disease with acute exacerbation (H)      Dose:  100 mg  Take 1 tablet (100 mg) by mouth daily  Quantity:  100 tablet  Refills:  0        CONTINUE these medicines which have NOT CHANGED      Dose / Directions   albuterol 108 (90 Base) MCG/ACT inhaler  Commonly known as:  PROAIR HFA/PROVENTIL HFA/VENTOLIN HFA      Dose:  2 puff  Inhale 2 puffs into the lungs every 4 hours as needed  Refills:  0     aspirin 81 MG chewable tablet  Commonly known as:  ASA      Dose:   81 mg  Take 81 mg by mouth daily  Refills:  0     atorvastatin 80 MG tablet  Commonly known as:  LIPITOR      Dose:  80 mg  Take 80 mg by mouth daily  Refills:  0     budesonide-formoterol 160-4.5 MCG/ACT Inhaler  Commonly known as:  SYMBICORT      Dose:  2 puff  Inhale 2 puffs into the lungs 2 times daily  Refills:  0     clopidogrel 75 MG tablet  Commonly known as:  PLAVIX      Dose:  75 mg  Take 75 mg by mouth daily Take for 1-year until 1/25/2020  Refills:  0     fluticasone 50 MCG/ACT nasal spray  Commonly known as:  FLONASE      Dose:  2 spray  Spray 2 sprays into both nostrils daily  Refills:  0     furosemide 20 MG tablet  Commonly known as:  LASIX      Dose:  20 mg  Take 20 mg by mouth daily  Refills:  0     ipratropium - albuterol 0.5 mg/2.5 mg/3 mL 0.5-2.5 (3) MG/3ML neb solution  Commonly known as:  DUONEB      Dose:  1 vial  Take 1 vial by nebulization every 4 hours as needed for shortness of breath / dyspnea or wheezing  Refills:  0     lisinopril 5 MG tablet  Commonly known as:  PRINIVIL/ZESTRIL      Dose:  5 mg  Take 5 mg by mouth daily  Refills:  0     metoprolol succinate  MG 24 hr tablet  Commonly known as:  TOPROL-XL      Dose:  100 mg  Take 100 mg by mouth daily  Refills:  0     multivitamin, therapeutic Tabs tablet      Dose:  1 tablet  Take 1 tablet by mouth daily  Refills:  0     nitroGLYcerin 0.4 MG sublingual tablet  Commonly known as:  NITROSTAT      Dose:  0.4 mg  Place 0.4 mg under the tongue every 5 minutes as needed for chest pain For chest pain place 1 tablet under the tongue every 5 minutes for 3 doses. If symptoms persist 5 minutes after 1st dose call 911.  Refills:  0     omeprazole 20 MG DR capsule  Commonly known as:  priLOSEC      Dose:  20 mg  Take 20 mg by mouth daily  Refills:  0     silver sulfADIAZINE 1 % external cream  Commonly known as:  SILVADENE      Apply topically as needed  Refills:  0           Where to get your medicines      These medications were sent to  [facility] [address] [place], [place] - [address] [address]    Phone:  [phone]     doxycycline hyclate 100 MG tablet    folic acid 1 MG tablet    saccharomyces boulardii 250 MG capsule    vitamin B1 100 MG tablet     Some of these will need a paper prescription and others can be bought over the counter. Ask your nurse if you have questions.    Bring a paper prescription for each of these medications    order for DME         We also reviewed Natalee Montalvo's allergy list and updated it as needed:  Allergies: Augmentin; Bupropion; Nicotine; Oxycodone; Pseudoephedrine; and Sertraline    Thank you for continuing to care for Natalee Montalvo.  We look forward to working together with you in the future.    Sincerely,  Rc King, [facility]

## 2019-04-06 NOTE — PROGRESS NOTES
NSG DISCHARGE NOTE    Patient discharged to [facility] at 3:40 PM via wheel chair for continued strengthening. Accompanied by other:transport aide and staff. Discharge instructions reviewed with DORINA [facility] during nurse to nurse report, opportunity offered to ask questions. Prescriptions sent to patients preferred pharmacy. All belongings sent with patient.    Enedina Morales

## 2019-04-06 NOTE — PLAN OF CARE
Physical Therapy Discharge Summary    Reason for therapy discharge:    Discharged to home with home therapy.    Progress towards therapy goal(s). [facility] for goal details.  Goals partially met.  Barriers to achieving goals:   discharge from facility. Limited assistance at home.  Discharge to home guarded, but pt reporting family will be present to stay with her. Pt deferring any  recommendations for short term rehab options.  Agreeable to homecare.      Therapy recommendation(s):    Continued therapy is recommended.  Rationale/Recommendations:  Pt continues with weakness, unsteady gait. .  Continue with homecare services.   to continue to maximize safety with functional mobility.

## 2019-04-06 NOTE — PHARMACY - DISCHARGE MEDICATION RECONCILIATION AND EDUCATION
Pharmacy:  Discharge Counseling and Medication Reconciliation    Natalee Montalvo  [address]  [facility] [address]  [phone] (home) [phone] (work)  68 year old female  PCP: Ketty Son    Allergies: Augmentin; Bupropion; Nicotine; Oxycodone; Pseudoephedrine; and Sertraline    Discharge Counseling:    No counseling provided as patient being discharged to SNF or Assisted Living    Discharge Medication Reconciliation:    Rc King RP has reviewed the patient's discharge medication orders and has compared them to the inpatient medication administration record and to what the patient was taking prior to admission - any discrepancies have been resolved.    It has been determined that the patient has an adequate supply of medications available or which can be obtained from [facility]    Thank you for the consult.    Rc King RPH........April 6, 2019 3:57 PM

## 2019-04-08 LAB
BACTERIA SPEC CULT: NORMAL
BACTERIA SPEC CULT: NORMAL
SPECIMEN SOURCE: NORMAL
SPECIMEN SOURCE: NORMAL

## 2020-11-10 NOTE — PROGRESS NOTES
"[facility]    Hospitalist Progress Note      Assessment & Plan   Natalee Montalvo is a 68 year old female who was admitted on 4/1/2019.     Hypovolemic shock (H)    Assessment: needed to start low dose dopamine overnight to maintain MAP of 60. Still getting aggressive intravenous fluids.    Plan:  Low-dose dopamine for hypotension. Check procalcitonin. Continue aggressive IV fluid replacement. Echo today.     Active Problems:    CAD (coronary artery disease)    Assessment: Chronic.  Recent non-ST elevation MI.  Troponin negative.  EKG does show ST depressions in V1 and V2 which have been seen on prior EKGs according to care everywhere.    Plan: Serial troponins, monitor EKG. Echo today       COPD (chronic obstructive pulmonary disease) (H)    Assessment: Chronic, stable.  No evidence for an exacerbation at this time    Plan: monitor       Alcoholism (H)    Assessment: Patient is a heavy alcohol user.  Her blood alcohol level is 0.04 on admission.  I am concerned that she could develop alcohol withdrawal.    Plan: CIWA protocol once hemodynamically stable.       Hypokalemia    Assessment: Severe, present on admission.  Related to nutrition and nausea vomiting diarrhea. Not improving much yet.    Plan: Replace, replace mag.       Hyponatremia    Assessment: Severe, related to poor intake and \"tea and toast diet\". Improving.    Plan: Normal saline should improve sodium levels     Hypomagnesemia  Assessment: Present on admission  Plan: Replace       Closed fracture of multiple ribs of right side    Assessment: Patient has 4 rib fractures on the right that are all acute and 2 old fractures on the left.    Plan: PT OT, Toradol for pain control       Prolonged Q-T interval on ECG    Assessment: Presumed secondary to hypokalemia and hypomagnesemia.  I do not see any other medications that prolong QT.    Plan: Replace potassium and magnesium      DVT Prophylaxis: Pneumatic Compression Devices  Code " R shoulder and elbow pain, mechanical fall last night, denies syncope/dizziness & hitting head Status: Full Code    John Saldivar    Interval History   Complains of chest wall pain. No fevers, chills. No dyspnea. No nausea, vomiting.     -Data reviewed today: I reviewed all new labs and imaging results over the last 24 hours. I personally reviewed no images or EKG's today.    Physical Exam   Temp: 98.6  F (37  C) Temp src: Temporal BP: 100/48 Pulse: 88 Heart Rate: 91 Resp: 12 SpO2: 95 % O2 Device: Nasal cannula Oxygen Delivery: 2 LPM  Vitals:    04/01/19 1646 04/01/19 2130   Weight: 64.4 kg (142 lb) 72 kg (158 lb 11.7 oz)     Vital Signs with Ranges  Temp:  [98.2  F (36.8  C)-98.6  F (37  C)] 98.6  F (37  C)  Pulse:  [73-98] 88  Heart Rate:  [71-98] 91  Resp:  [9-22] 12  BP: ()/(29-71) 100/48  FiO2 (%):  [2 %] 2 %  SpO2:  [90 %-100 %] 95 %  I/O last 3 completed shifts:  In: 3893.7 [I.V.:3893.7]  Out: 2050 [Urine:2050]    GENERAL: Comfortable, talkative, in no apparent distress.  HEENT: Anicteric, non-injected sclera, mouth moist.   NECK: No JVD.  CARDIOVASCULAR: regular rate and rhythm, no murmur. No lower extremity edema   RESPIRATORY: Clear to auscultation bilaterally, no wheezes, no crackles.  GI: Non-distended, normal bowel sounds, soft, non-tender.  SKIN: No rashes, sores.   NEUROLOGY: Alert and oriented x3, follows commands, speech and language normal.       Medications     dextrose 5% and 0.9% NaCl 125 mL/hr at 04/02/19 0229     DOPamine 2 mcg/kg/min (04/02/19 0658)     potassium chloride       sodium chloride 125 mL/hr at 04/02/19 0129       sodium chloride 0.9%  1,000 mL Intravenous Once     aspirin  81 mg Oral Daily     atorvastatin  80 mg Oral Daily     clopidogrel  75 mg Oral Daily     insulin aspart  1-7 Units Subcutaneous TID AC     insulin aspart  1-5 Units Subcutaneous At Bedtime     sodium chloride (PF)  3 mL Intracatheter Q8H       Data   Recent Labs   Lab 04/02/19  0400 04/01/19  1938 04/01/19  1720   WBC 5.7  --  6.7   HGB 10.5*  --  11.9   MCV 99  --  95     --  219   NA  129*  --  121*   POTASSIUM 2.5* 2.7* 2.5*   CHLORIDE 93*  --  80*   CO2 29  --  20*   BUN 5*  --  6*   CR 0.51*  --  0.56*   ANIONGAP 7  --  21*   FELICIANO 7.6*  --  8.6   *  --  149*   ALBUMIN 3.3*  --  3.5   PROTTOTAL 5.5*  --  6.4   BILITOTAL 0.5  --  0.6   ALKPHOS 90  --  102   ALT 30  --  35   AST 50*  --  65*   TROPI 0.035* <0.030 <0.030       Recent Results (from the past 24 hour(s))   XR Chest Port 1 View    Narrative    PROCEDURE:  XR CHEST PORT 1 VW    HISTORY: chest tightness -recent NSTEMI. .    COMPARISON:  6/5/07.    FINDINGS:    The cardiomediastinal contours are within normal limits. There is  calcific aortic atherosclerosis. Changes of prior median sternotomy  are seen.  No focal consolidation, effusion or pneumothorax.  Age indeterminant right mid posterior rib fractures are noted.      Impression    IMPRESSION:  Age-indeterminate right rib fractures.      SEEMA ALEXANDRE MD   CT Head w/o Contrast    Narrative    PROCEDURE: CT HEAD W/O CONTRAST     HISTORY: Head trauma, minor, pt on anticoagulation.    COMPARISON: None.    TECHNIQUE:  Helical images of the head from the foramen magnum to the  vertex were obtained without contrast.    FINDINGS: The ventricles and sulci are prominent, compatible with  mild, generalized volume loss. No acute intracranial hemorrhage, mass  effect, midline shift, hydrocephalus or basilar cystern effacement are  present.    The grey-white matter interface is preserved.     The calvarium is intact. An air-fluid level is present in the left  maxillary sinus, which can be seen in acute on chronic sinusitis.      Impression    IMPRESSION: No acute intracranial hemorrhage.      SEEMA ALEXANDRE MD   CT Cervical Spine w/o Contrast    Narrative    PROCEDURE: CT CERVICAL SPINE W/O CONTRAST     HISTORY: C-spine trauma, ligamentous injury suspected.    TECHNIQUE: Helical noncontrast CT images of the cervical spine.    COMPARISON: None.    FINDINGS:     No acute fracture  is identified. The vertebral bodies are normal in  height. The cervical lordosis is straightened. The C1-2 articulation  and the craniocervical junction are intact.     Scattered senescent changes are present. Vascular calcifications are  noted at the carotid bifurcations. Bulky calcific change within the  proximal left subclavian may reflect an occluded stent. Correlate for  evidence of subclavian steal.    Emphysematous changes are present at the lung apices.       Impression    IMPRESSION: No evidence of acute cervical spine fracture.    SEEMA ALEXANDRE MD   CT Chest Pulmonary Embolism w Contrast    Narrative    PROCEDURE:  CT CHEST PULMONARY EMBOLISM W CONTRAST.    HISTORY:  Evaluate for pulmonary embolism.  PE suspected, high pretest  prob    TECHNIQUE:  Initial pre-contrast  and localizer images were  obtained.  Contrast enhanced helical CT pulmonary angiography was then  performed.  Routine transaxial and post-processed (multiplanar and/or  MIP) reformations were obtained.    COMPARISON:  Radiograph earlier today.    MEDS/CONTRAST: 100 mL omnipaque 350    PULMONARY CTA FINDINGS:  This is a diagnostic quality helical CT  pulmonary angiogram.  There is no acute pulmonary embolism within the  limitations of mild respiratory motion.    OTHER FINDINGS:      The heart is borderline enlarged. Coronary calcifications are present.  There is no pericardial thickening or effusions. There is no  mediastinal, hilar, or axillary lymphadenopathy. The thoracic aorta  and main pulmonary artery are within normal limits in size.     Limited views of the upper abdomen reveal no adrenal mass or acute  process. Increased colonic submucosal fat is present.    The pleura is without thickening or effusions. The central airways are  unremarkable. Dependent atelectasis is present.    A chronic right fifth for rib fracture is present. Acute right sixth,  seventh, eighth and ninth rib fractures are present.      Impression     IMPRESSION:    No acute pulmonary emboli.    Acute right sixth, seventh, eighth and ninth rib fractures are  present. Probable atelectasis at the bases from splinting. No  pneumothorax.    SEEMA ALEXANDRE MD

## 2021-11-30 ENCOUNTER — OFFICE VISIT (OUTPATIENT)
Dept: OTOLARYNGOLOGY | Facility: OTHER | Age: 71
End: 2021-11-30
Attending: OTOLARYNGOLOGY
Payer: MEDICARE

## 2021-11-30 DIAGNOSIS — J34.2 DEVIATED NASAL SEPTUM: ICD-10-CM

## 2021-11-30 DIAGNOSIS — H69.83 OTHER SPECIFIED DISORDERS OF EUSTACHIAN TUBE, BILATERAL: ICD-10-CM

## 2021-11-30 DIAGNOSIS — R42 DYSEQUILIBRIUM: Primary | ICD-10-CM

## 2021-11-30 PROCEDURE — G0463 HOSPITAL OUTPT CLINIC VISIT: HCPCS

## 2021-11-30 NOTE — NURSING NOTE
Patient here to see ENT for hearing loss and throat concerns.   Sofy Jimenez LPN ..........11/30/2021 2:35 PM

## 2021-12-06 NOTE — PROGRESS NOTES
document embedded image  Patient Name: Natalee Montalvo    Address: [address] [place] [address]     YOB: 1950    [place] [address]    MR Number: [id number]    Phone: [phone]  PCP: REFERRAL,SELF            Appointment Date: 11/30/21   Visit Provider: Jose Guadalupe Villasenor MD    cc: REFERRAL,SELF; ~    ENT Progress Note  Visit Reasons: Hearing Loss, Throat Concerns    HPI  History of Present Illness  Chief complaint:  Stuffy ears, nasal congestion, generalized imbalance    History  The patient is a 71-year-old female who was recently hospitalized in [place] with upper respiratory tract symptoms and bronchitis.  She believes she had a pneumonia as well.  She had a CT scan of her sinuses obtained and was referred here for further evaluation.  Her major complaint is her significant disequilibrium and unsteadiness with gait for.  She denies any true vertigo.  She denies ear pain.    Exam  The external auditory canals and TMs are clear bilaterally  There is no gaze induced or spontaneous nystagmus.   Her gait seems unsteady and she reaches out to touch a wall or further tracts she passes to improve her balance.  Nasal-her septum deviates to the left.  There is no purulence or polyps.   Oral cavity oropharynx-free of mucosal lesion or inflammation  Neck-no masses or adenopathy  Head neck integument-Clear  General-the patient appears well and in no distress  Neuro-there are no focal cranial nerve deficits    A&P  Assessment & Plan  (1) Disequilibrium:        Status: Acute        Code(s):  R42 - Dizziness and giddiness  (2) Nasal septal deviation:        Status: Acute        Code(s):  J34.2 - Deviated nasal septum  (3) Eustachian tube dysfunction:        Status: Acute        Code(s):  H69.80 - Other specified disorders of Eustachian tube, unspecified ear        Qualifiers:          Laterality: bilateral  Qualified Code(s): H69.83 - Other specified disorders of Eustachian tube, bilateral  Additional Plan  Details  Plan Details: She was counseled to use her nasal saline irrigation system at least twice daily.  She was reassured that her ears do not appear to be a contributing factor to her disequilibrium.  This is more consistent with central phenomenon.  I will obtain her CT of her sinus and call her once have had a chance to review these films.      oJse Guadalupe Villasenor MD    11/29/21 2843    <Electronically signed by Jose Gudaalupe Villasenor MD> 11/30/21 9693

## 2022-01-04 ENCOUNTER — OFFICE VISIT (OUTPATIENT)
Dept: OTOLARYNGOLOGY | Facility: OTHER | Age: 72
End: 2022-01-04
Attending: OTOLARYNGOLOGY
Payer: MEDICARE

## 2022-01-04 DIAGNOSIS — Z09 POSTOP CHECK: Primary | ICD-10-CM

## 2022-01-04 PROCEDURE — G0463 HOSPITAL OUTPT CLINIC VISIT: HCPCS

## 2022-01-04 NOTE — PROGRESS NOTES
document embedded image  Patient Name: Natalee Montalvo    Address: [address] [place] [address]     YOB: 1950    [place] [address]    MR Number: [id number]    Phone: [phone]  PCP: MARIBEL Devries            Appointment Date: 01/04/22   Visit Provider: Jose Guadalupe Villasenor MD    cc: MARIBEL Devries; ~    ENT Progress Note  Intake  Visit Reasons: Post OP Sinus surgery    HPI  History of Present Illness  Chief complaint: Postop check    History  The patient is a 71-year-old female who is recently status post endoscopic sinus surgery.  She has done very well since surgery.  She has symptomatic relief from the procedure.     Exam  Nasal-her middle meati are widely patent.  There is no purulence drainage.  There is no polypoid material.    Allergies    amoxicillin [From Augmentin] Adverse Reaction (Verified 12/20/21 16:21)  Gastrointestinal Upset  bupropion [From Wellbutrin] Adverse Reaction (Verified 12/20/21 16:21)  Insomnia  clavulanic acid [From Augmentin] Adverse Reaction (Verified 12/20/21 16:21)  Gastrointestinal Upset  nicotine [From Nicoderm CQ] Adverse Reaction (Verified 12/20/21 16:21)  Rash  oxycodone Adverse Reaction (Verified 12/20/21 16:21)  Nausea  pseudoephedrine Adverse Reaction (Verified 12/20/21 16:21)  Hypertension  sertraline Adverse Reaction (Verified 12/20/21 16:21)  Rash    PFSH  PFSH:     Social History  Smoking Status:  Current every day smoker       A&P  Assessment & Plan  (1) Postop check:        Status: Acute        Code(s):  Z09 - Encounter for follow-up examination after completed treatment for conditions other than malignant neoplasm  She was encouraged to continue twice daily nasal saline irrigation and follow up if she has worsening symptoms.      Jose Guadalupe Villasenor MD    01/03/22 9693    <Electronically signed by Jose Guadalupe Villasenor MD> 01/05/22 6604

## 2022-03-29 ENCOUNTER — OFFICE VISIT (OUTPATIENT)
Dept: OTOLARYNGOLOGY | Facility: OTHER | Age: 72
End: 2022-03-29
Attending: OTOLARYNGOLOGY
Payer: MEDICARE

## 2022-03-29 DIAGNOSIS — J34.89 NASAL CRUSTING: Primary | ICD-10-CM

## 2022-03-29 DIAGNOSIS — H90.3 SENSORY HEARING LOSS, BILATERAL: ICD-10-CM

## 2022-03-29 PROCEDURE — G0463 HOSPITAL OUTPT CLINIC VISIT: HCPCS

## 2022-03-29 PROCEDURE — 96401 CHEMO ANTI-NEOPL SQ/IM: CPT

## 2022-04-01 NOTE — PROGRESS NOTES
document embedded image  Patient Name: Natalee Montalvo    Address: [address] [place] [address]     YOB: 1950    [place] [address]    MR Number: [id number]    Phone: [phone]  PCP: MARIBEL Devries            Appointment Date: 03/29/22   Visit Provider: Jose Guadalupe Villasenor MD    cc: MARIBEL Devries; ~    ENT Progress Note  Intake  Visit Reasons: Follow up Nasal    HPI  History of Present Illness    Chief complaint:  Crusting left nasal vault, decreased hearing    History  The patient is a 71-year-old female who is status post endoscopic sinus surgery bilaterally last December.  She comes to the office today could she is noticing some crusting in her left nasal vault intermittently.  She is having no pain.  She denies purulence nasal drainage.  She denies nasal obstruction.  She has additional concerns regarding her hearing.  She states family members of complaint ear she is not hearing well.  Her hearing history is unremarkable.     Exam  The external auditory canals and TMs are clear  Oral cavity oropharynx-free of lesions or inflammation  Nasal -there is no synechiae formation.  Her middle meati appear patent bilaterally.  There is no purulence.  Neck-no masses or adenopathy  Head neck integument -Clear  General -the patient appears well and no distress  Neuro -there are no focal cranial nerve deficits  Audiogram -sloping sensorineural hearing loss bilaterally with speech reception at 35 decibels and discrimination scores that are intact.    Allergies    amoxicillin [From Augmentin] Adverse Reaction (Verified 12/20/21 16:21)  Gastrointestinal Upset  bupropion [From Wellbutrin] Adverse Reaction (Verified 12/20/21 16:21)  Insomnia  clavulanic acid [From Augmentin] Adverse Reaction (Verified 12/20/21 16:21)  Gastrointestinal Upset  nicotine [From Nicoderm CQ] Adverse Reaction (Verified 12/20/21 16:21)  Rash  oxycodone Adverse Reaction (Verified 12/20/21 16:21)  Nausea  pseudoephedrine Adverse  Reaction (Verified 12/20/21 16:21)  Hypertension  sertraline Adverse Reaction (Verified 12/20/21 16:21)  Rash    PFSH  PFSH:     Social History  Smoking Status:  Current every day smoker       A&P  Assessment & Plan  (1) Nasal crusting:        Status: Acute        Code(s):  J34.89 - Other specified disorders of nose and nasal sinuses  (2) Sensorineural hearing loss, bilateral:        Status: Acute        Code(s):  H90.3 - Sensorineural hearing loss, bilateral    Plan   patient was advised to continue daily nasal saline irrigations.  She is welcome to follow up if she has worsening nasal symptoms.  She was likewise advised that she is a candidate for hearing aid and we would be happy to help her wrist she needs assistance in the future.      Jose Guadalupe Villasenor MD    03/29/22 1019    <Electronically signed by Jose Guadalupe Villasenor MD> 03/30/22 2047

## 2022-05-21 ENCOUNTER — HEALTH MAINTENANCE LETTER (OUTPATIENT)
Age: 72
End: 2022-05-21

## 2022-09-17 ENCOUNTER — HEALTH MAINTENANCE LETTER (OUTPATIENT)
Age: 72
End: 2022-09-17

## 2023-06-17 ENCOUNTER — HOSPITAL ENCOUNTER (EMERGENCY)
Facility: OTHER | Age: 73
Discharge: ANOTHER HOSPITAL | End: 2023-06-18
Attending: EMERGENCY MEDICINE | Admitting: EMERGENCY MEDICINE
Payer: MEDICARE

## 2023-06-17 ENCOUNTER — APPOINTMENT (OUTPATIENT)
Dept: CT IMAGING | Facility: OTHER | Age: 73
End: 2023-06-17
Attending: EMERGENCY MEDICINE
Payer: MEDICARE

## 2023-06-17 ENCOUNTER — APPOINTMENT (OUTPATIENT)
Dept: GENERAL RADIOLOGY | Facility: OTHER | Age: 73
End: 2023-06-17
Attending: EMERGENCY MEDICINE
Payer: MEDICARE

## 2023-06-17 ENCOUNTER — APPOINTMENT (OUTPATIENT)
Dept: CT IMAGING | Facility: OTHER | Age: 73
End: 2023-06-17
Attending: STUDENT IN AN ORGANIZED HEALTH CARE EDUCATION/TRAINING PROGRAM
Payer: MEDICARE

## 2023-06-17 ENCOUNTER — APPOINTMENT (OUTPATIENT)
Dept: GENERAL RADIOLOGY | Facility: OTHER | Age: 73
End: 2023-06-17
Attending: STUDENT IN AN ORGANIZED HEALTH CARE EDUCATION/TRAINING PROGRAM
Payer: MEDICARE

## 2023-06-17 DIAGNOSIS — R55 SYNCOPE AND COLLAPSE: ICD-10-CM

## 2023-06-17 DIAGNOSIS — K92.2 GASTROINTESTINAL HEMORRHAGE, UNSPECIFIED GASTROINTESTINAL HEMORRHAGE TYPE: ICD-10-CM
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
## 2023-06-17 DIAGNOSIS — D62 ANEMIA DUE TO BLOOD LOSS, ACUTE: ICD-10-CM

## 2023-06-17 DIAGNOSIS — S22.43XA CLOSED FRACTURE OF MULTIPLE RIBS OF BOTH SIDES, INITIAL ENCOUNTER: ICD-10-CM

## 2023-06-17 PROBLEM — S71.119A THIGH LACERATION: Status: ACTIVE | Noted: 2023-06-17

## 2023-06-17 PROBLEM — M25.511 ACUTE PAIN OF RIGHT SHOULDER: Status: ACTIVE | Noted: 2023-06-17

## 2023-06-17 PROBLEM — K70.0 ALCOHOLIC FATTY LIVER: Status: ACTIVE | Noted: 2019-01-19

## 2023-06-17 PROBLEM — I25.2 HISTORY OF NON-ST ELEVATION MYOCARDIAL INFARCTION (NSTEMI): Status: ACTIVE | Noted: 2019-01-19

## 2023-06-17 PROBLEM — R94.39 ABNORMAL NUCLEAR STRESS TEST: Status: ACTIVE | Noted: 2021-06-10
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
## 2023-06-17 PROBLEM — R79.89 ELEVATED TROPONIN: Status: ACTIVE | Noted: 2023-06-17

## 2023-06-17 LAB
ABO/RH(D): NORMAL
ALBUMIN SERPL BCG-MCNC: 4.1 G/DL (ref 3.5–5.2)
ALP SERPL-CCNC: 92 U/L (ref 35–104)
ALT SERPL W P-5'-P-CCNC: 27 U/L (ref 0–50)
ANION GAP SERPL CALCULATED.3IONS-SCNC: 14 MMOL/L (ref 7–15)
ANTIBODY SCREEN: NEGATIVE
APTT PPP: 47 SECONDS (ref 22–38)
AST SERPL W P-5'-P-CCNC: 33 U/L (ref 0–45)
BASOPHILS # BLD AUTO: 0 10E3/UL (ref 0–0.2)
BASOPHILS NFR BLD AUTO: 1 %
BILIRUB SERPL-MCNC: <0.2 MG/DL
BLD PROD TYP BPU: NORMAL
BLD PROD TYP BPU: NORMAL
BLOOD COMPONENT TYPE: NORMAL
BLOOD COMPONENT TYPE: NORMAL
BUN SERPL-MCNC: 64.8 MG/DL (ref 8–23)
C DIFF TOX B STL QL: NEGATIVE
CALCIUM SERPL-MCNC: 8.8 MG/DL (ref 8.8–10.2)
CHLORIDE SERPL-SCNC: 95 MMOL/L (ref 98–107)
CODING SYSTEM: NORMAL
CODING SYSTEM: NORMAL
CREAT SERPL-MCNC: 1.05 MG/DL (ref 0.51–0.95)
CROSSMATCH: NORMAL
CROSSMATCH: NORMAL
DEPRECATED HCO3 PLAS-SCNC: 24 MMOL/L (ref 22–29)
EOSINOPHIL # BLD AUTO: 0.1 10E3/UL (ref 0–0.7)
EOSINOPHIL NFR BLD AUTO: 1 %
ERYTHROCYTE [DISTWIDTH] IN BLOOD BY AUTOMATED COUNT: 17.6 % (ref 10–15)
ETHANOL SERPL-MCNC: <0.01 G/DL
GFR SERPL CREATININE-BSD FRML MDRD: 55 ML/MIN/1.73M2
GLUCOSE SERPL-MCNC: 127 MG/DL (ref 70–99)
HCT VFR BLD AUTO: 24.7 % (ref 35–47)
HEMOCCULT STL QL: POSITIVE
HGB BLD-MCNC: 8 G/DL (ref 11.7–15.7)
HGB BLD-MCNC: 8.3 G/DL (ref 11.7–15.7)
IMM GRANULOCYTES # BLD: 0 10E3/UL
IMM GRANULOCYTES NFR BLD: 0 %
INR PPP: 2.38 (ref 0.85–1.15)
ISSUE DATE AND TIME: NORMAL
ISSUE DATE AND TIME: NORMAL
LYMPHOCYTES # BLD AUTO: 1.1 10E3/UL (ref 0.8–5.3)
LYMPHOCYTES NFR BLD AUTO: 21 %
MCH RBC QN AUTO: 34.7 PG (ref 26.5–33)
MCHC RBC AUTO-ENTMCNC: 33.6 G/DL (ref 31.5–36.5)
MCV RBC AUTO: 103 FL (ref 78–100)
MONOCYTES # BLD AUTO: 0.5 10E3/UL (ref 0–1.3)
MONOCYTES NFR BLD AUTO: 9 %
NEUTROPHILS # BLD AUTO: 3.5 10E3/UL (ref 1.6–8.3)
NEUTROPHILS NFR BLD AUTO: 68 %
NRBC # BLD AUTO: 0 10E3/UL
NRBC BLD AUTO-RTO: 0 /100
PLATELET # BLD AUTO: 136 10E3/UL (ref 150–450)
POTASSIUM SERPL-SCNC: 4.7 MMOL/L (ref 3.4–5.3)
PROT SERPL-MCNC: 6.1 G/DL (ref 6.4–8.3)
RBC # BLD AUTO: 2.39 10E6/UL (ref 3.8–5.2)
SODIUM SERPL-SCNC: 133 MMOL/L (ref 136–145)
SPECIMEN EXPIRATION DATE: NORMAL
TROPONIN T SERPL HS-MCNC: 23 NG/L
TROPONIN T SERPL HS-MCNC: 24 NG/L
UNIT ABO/RH: NORMAL
UNIT ABO/RH: NORMAL
UNIT NUMBER: NORMAL
UNIT NUMBER: NORMAL
UNIT STATUS: NORMAL
UNIT STATUS: NORMAL
UNIT TYPE ISBT: 5100
UNIT TYPE ISBT: 5100
WBC # BLD AUTO: 5.2 10E3/UL (ref 4–11)

## 2023-06-17 PROCEDURE — G1010 CDSM STANSON: HCPCS

## 2023-06-17 PROCEDURE — 86923 COMPATIBILITY TEST ELECTRIC: CPT | Performed by: EMERGENCY MEDICINE

## 2023-06-17 PROCEDURE — 99292 CRITICAL CARE ADDL 30 MIN: CPT | Performed by: EMERGENCY MEDICINE

## 2023-06-17 PROCEDURE — 85025 COMPLETE CBC W/AUTO DIFF WBC: CPT | Performed by: EMERGENCY MEDICINE

## 2023-06-17 PROCEDURE — 96374 THER/PROPH/DIAG INJ IV PUSH: CPT | Mod: XU | Performed by: EMERGENCY MEDICINE

## 2023-06-17 PROCEDURE — 99291 CRITICAL CARE FIRST HOUR: CPT | Mod: 25 | Performed by: EMERGENCY MEDICINE

## 2023-06-17 PROCEDURE — 99285 EMERGENCY DEPT VISIT HI MDM: CPT | Mod: 25 | Performed by: EMERGENCY MEDICINE

## 2023-06-17 PROCEDURE — 12034 INTMD RPR S/TR/EXT 7.6-12.5: CPT | Performed by: EMERGENCY MEDICINE

## 2023-06-17 PROCEDURE — 250N000013 HC RX MED GY IP 250 OP 250 PS 637: Performed by: STUDENT IN AN ORGANIZED HEALTH CARE EDUCATION/TRAINING PROGRAM

## 2023-06-17 PROCEDURE — 87493 C DIFF AMPLIFIED PROBE: CPT | Performed by: EMERGENCY MEDICINE

## 2023-06-17 PROCEDURE — 96361 HYDRATE IV INFUSION ADD-ON: CPT | Mod: XU | Performed by: EMERGENCY MEDICINE

## 2023-06-17 PROCEDURE — 36430 TRANSFUSION BLD/BLD COMPNT: CPT | Performed by: EMERGENCY MEDICINE

## 2023-06-17 PROCEDURE — 93005 ELECTROCARDIOGRAM TRACING: CPT | Mod: XU | Performed by: EMERGENCY MEDICINE

## 2023-06-17 PROCEDURE — 250N000009 HC RX 250: Performed by: EMERGENCY MEDICINE

## 2023-06-17 PROCEDURE — 93010 ELECTROCARDIOGRAM REPORT: CPT | Performed by: INTERNAL MEDICINE

## 2023-06-17 PROCEDURE — 86901 BLOOD TYPING SEROLOGIC RH(D): CPT | Performed by: EMERGENCY MEDICINE

## 2023-06-17 PROCEDURE — 84484 ASSAY OF TROPONIN QUANT: CPT | Mod: 91 | Performed by: EMERGENCY MEDICINE

## 2023-06-17 PROCEDURE — 71250 CT THORAX DX C-: CPT | Mod: TC,MA

## 2023-06-17 PROCEDURE — 85018 HEMOGLOBIN: CPT | Mod: XU | Performed by: STUDENT IN AN ORGANIZED HEALTH CARE EDUCATION/TRAINING PROGRAM

## 2023-06-17 PROCEDURE — 82077 ASSAY SPEC XCP UR&BREATH IA: CPT | Performed by: EMERGENCY MEDICINE

## 2023-06-17 PROCEDURE — 36415 COLL VENOUS BLD VENIPUNCTURE: CPT | Performed by: EMERGENCY MEDICINE

## 2023-06-17 PROCEDURE — 258N000003 HC RX IP 258 OP 636: Performed by: EMERGENCY MEDICINE

## 2023-06-17 PROCEDURE — P9016 RBC LEUKOCYTES REDUCED: HCPCS | Performed by: EMERGENCY MEDICINE

## 2023-06-17 PROCEDURE — 71045 X-RAY EXAM CHEST 1 VIEW: CPT

## 2023-06-17 PROCEDURE — 85730 THROMBOPLASTIN TIME PARTIAL: CPT | Performed by: EMERGENCY MEDICINE

## 2023-06-17 PROCEDURE — G0390 TRAUMA RESPONS W/HOSP CRITI: HCPCS | Performed by: EMERGENCY MEDICINE

## 2023-06-17 PROCEDURE — 73030 X-RAY EXAM OF SHOULDER: CPT | Mod: TC,RT

## 2023-06-17 PROCEDURE — 85610 PROTHROMBIN TIME: CPT | Performed by: EMERGENCY MEDICINE

## 2023-06-17 PROCEDURE — 82272 OCCULT BLD FECES 1-3 TESTS: CPT | Performed by: EMERGENCY MEDICINE

## 2023-06-17 PROCEDURE — 36416 COLLJ CAPILLARY BLOOD SPEC: CPT | Performed by: EMERGENCY MEDICINE

## 2023-06-17 PROCEDURE — 86850 RBC ANTIBODY SCREEN: CPT | Performed by: EMERGENCY MEDICINE

## 2023-06-17 PROCEDURE — 250N000011 HC RX IP 250 OP 636: Performed by: STUDENT IN AN ORGANIZED HEALTH CARE EDUCATION/TRAINING PROGRAM

## 2023-06-17 PROCEDURE — 80053 COMPREHEN METABOLIC PANEL: CPT | Performed by: EMERGENCY MEDICINE

## 2023-06-17 RX ORDER — LIDOCAINE 40 MG/G
CREAM TOPICAL
Status: DISCONTINUED | OUTPATIENT
Start: 2023-06-17 | End: 2023-06-18 | Stop reason: HOSPADM

## 2023-06-17 RX ORDER — LIDOCAINE HYDROCHLORIDE AND EPINEPHRINE 10; 10 MG/ML; UG/ML
20 INJECTION, SOLUTION INFILTRATION; PERINEURAL ONCE
Status: COMPLETED | OUTPATIENT
Start: 2023-06-17 | End: 2023-06-17

## 2023-06-17 RX ORDER — FENTANYL CITRATE 50 UG/ML
50 INJECTION, SOLUTION INTRAMUSCULAR; INTRAVENOUS ONCE
Status: COMPLETED | OUTPATIENT
Start: 2023-06-17 | End: 2023-06-17

## 2023-06-17 RX ORDER — SODIUM CHLORIDE 9 MG/ML
INJECTION, SOLUTION INTRAVENOUS CONTINUOUS
Status: DISCONTINUED | OUTPATIENT
Start: 2023-06-17 | End: 2023-06-18 | Stop reason: HOSPADM

## 2023-06-17 RX ADMIN — SODIUM CHLORIDE: 9 INJECTION, SOLUTION INTRAVENOUS at 20:53

## 2023-06-17 RX ADMIN — Medication 10 MG: at 19:53

## 2023-06-17 RX ADMIN — LIDOCAINE HYDROCHLORIDE,EPINEPHRINE BITARTRATE 20 ML: 10; .01 INJECTION, SOLUTION INFILTRATION; PERINEURAL at 17:43

## 2023-06-17 RX ADMIN — SODIUM CHLORIDE 1000 ML: 0.9 INJECTION, SOLUTION INTRAVENOUS at 16:45

## 2023-06-17 RX ADMIN — FENTANYL CITRATE 50 MCG: 50 INJECTION, SOLUTION INTRAMUSCULAR; INTRAVENOUS at 20:40

## 2023-06-17 ASSESSMENT — ENCOUNTER SYMPTOMS
ARTHRALGIAS: 0
CHEST TIGHTNESS: 0
SHORTNESS OF BREATH: 0
WEAKNESS: 1
FEVER: 0
AGITATION: 0
VOMITING: 0
WOUND: 1
DYSURIA: 0
CHILLS: 0
NAUSEA: 0
LIGHT-HEADEDNESS: 1
PALPITATIONS: 0
ABDOMINAL PAIN: 0

## 2023-06-17 ASSESSMENT — ACTIVITIES OF DAILY LIVING (ADL)
ADLS_ACUITY_SCORE: 35

## 2023-06-17 NOTE — ED TRIAGE NOTES
Pt came from home with MEDS  1, pt was having pain between her shoulder blades today and had recent CABG 2-3 months ago with STENTs, pt took a nitroglycerin and became diy causing a fall, EKG normal, BP was SBP 80, unable to obtain IV. Pt is alert and oriented. Pt also c/o right shoulder pain which says she tweaked. Also has laceration on left thigh is on blood thinners

## 2023-06-17 NOTE — ED PROVIDER NOTES
History     Chief Complaint   Patient presents with     Hypotension     Fall     Trauma     HPI  Natalee Montalvo is a 73 year old female who comes in by ambulance after falling at home.  Patient had recent CABG, 2 to 3 months ago.  Also has stents in place.  She was getting ready to go to the  today.  She was experiencing pain between her shoulder blades and took a nitro.  She then laid down for about 5 or 10 minutes.  It really did not get away but she got up anyway to go start getting ready.  She remembers feeling weak dizzy shaky and then remembers falling.  She thinks she may have passed out for 2 or 3 seconds but not long at all.  She was on the floor in the hallway, could not get up on her own.  She has a significant laceration to the front of her thigh.  She said she could see it was very deep but it was not bleeding that badly.  She was eventually able to get to a phone to call for help.  On arrival she still complains of the pain in her shoulder blades.  She is feeling kind of dizzy and weak.  Denies any chest pain heaviness tightness or squeezing.  No pain or numbness in her left arm, which is what she had with her previous heart attacks.  Vital signs with pretty low blood pressure at 73/38 on arrival.    Allergies:  Allergies   Allergen Reactions     Amoxicillin-Pot Clavulanate      Other reaction(s): GI intolerance  Stomach cramps     Bupropion      Other reaction(s): Other  Insomnia and tension     Nicotine Rash     Oxycodone Nausea     Pseudoephedrine      Other reaction(s): Increased blood pressure, Tachycardia  Advise not to use this medication which was in her allergy product.     Sertraline Rash       Problem List:    Patient Active Problem List    Diagnosis Date Noted     Thigh laceration, left, subsequent encounter 2023     Priority: Medium     Low blood pressure reading 2023     Priority: Medium     Anemia due to blood loss, acute 2023     Priority: Medium      Elevated troponin 06/17/2023     Priority: Medium     Acute pain of right shoulder 06/17/2023     Priority: Medium     Thigh laceration 06/17/2023     Priority: Medium     Fall 06/17/2023     Priority: Medium     Acute on chronic combined systolic and diastolic congestive heart failure (H) 01/19/2022     Priority: Medium     Abnormal nuclear stress test 06/10/2021     Priority: Medium     Formatting of this note might be different from the original.  Added automatically from request for surgery [id number]       Alcohol withdrawal (H) 04/05/2019     Priority: Medium     Hypovolemic shock (H) 04/01/2019     Priority: Medium     CAD (coronary artery disease) 04/01/2019     Priority: Medium     COPD (chronic obstructive pulmonary disease) (H) 04/01/2019     Priority: Medium     Alcoholism (H) 04/01/2019     Priority: Medium     Hypokalemia 04/01/2019     Priority: Medium     Hyponatremia 04/01/2019     Priority: Medium     Closed fracture of multiple ribs of right side 04/01/2019     Priority: Medium     Prolonged Q-T interval on ECG 04/01/2019     Priority: Medium     Alcoholic fatty liver 01/19/2019     Priority: Medium     History of non-ST elevation myocardial infarction (NSTEMI) 01/19/2019     Priority: Medium        Past Medical History:    Past Medical History:   Diagnosis Date     Alcoholism (H) 4/1/2019     CAD (coronary artery disease) 4/1/2019     COPD (chronic obstructive pulmonary disease) (H) 4/1/2019       Past Surgical History:    No past surgical history on file.    Family History:    No family history on file.    Social History:  Marital Status:   [5]  Social History     Tobacco Use     Smoking status: Every Day     Packs/day: 0.50     Types: Cigarettes     Smokeless tobacco: Never   Substance Use Topics     Alcohol use: Yes     Comment: daily     Drug use: No     Comment: Drug use: No        Medications:    albuterol (PROAIR HFA/PROVENTIL HFA/VENTOLIN HFA) 108 (90 BASE) MCG/ACT Inhaler  aspirin  "(ASA) 81 MG chewable tablet  atorvastatin (LIPITOR) 80 MG tablet  budesonide-formoterol (SYMBICORT) 160-4.5 MCG/ACT Inhaler  doxycycline hyclate (VIBRA-TABS) 100 MG tablet  ferrous fumarate 65 mg, [place]. FE,-Vitamin C 125 mg (VITRON C)  MG TABS tablet  fluticasone (FLONASE) 50 MCG/ACT nasal spray  folic acid (FOLVITE) 1 MG tablet  furosemide (LASIX) 20 MG tablet  ipratropium - albuterol 0.5 mg/2.5 mg/3 mL (DUONEB) 0.5-2.5 (3) MG/3ML neb solution  lisinopril (PRINIVIL/ZESTRIL) 5 MG tablet  metoprolol succinate ER (TOPROL-XL) 100 MG 24 hr tablet  multivitamin, therapeutic (THERA-VIT) TABS tablet  nitroGLYcerin (NITROSTAT) 0.4 MG sublingual tablet  omeprazole (PRILOSEC) 20 MG DR capsule  order for DME  saccharomyces boulardii (FLORASTOR) 250 MG capsule  silver sulfADIAZINE (SILVADENE) 1 % external cream  vitamin B1 (THIAMINE) 100 MG tablet          Review of Systems   Constitutional: Negative for chills and fever.   HENT: Negative for congestion.    Respiratory: Negative for chest tightness and shortness of breath.    Cardiovascular: Negative for chest pain and palpitations.   Gastrointestinal: Negative for abdominal pain, nausea and vomiting.   Genitourinary: Negative for dysuria.   Musculoskeletal: Negative for arthralgias.   Skin: Positive for wound.   Neurological: Positive for syncope, weakness and light-headedness.   Psychiatric/Behavioral: Negative for agitation.       Physical Exam   BP: (!) 73/38  Pulse: 80  Temp: 97  F (36.1  C)  Resp: 18  Height: 160 cm (5' 3\")  Weight: 68.9 kg (152 lb)  SpO2: 100 %      Physical Exam  Vitals and nursing note reviewed.   Constitutional:       Appearance: Normal appearance.   HENT:      Head: Normocephalic and atraumatic.      Mouth/Throat:      Mouth: Mucous membranes are moist.   Eyes:      Conjunctiva/sclera: Conjunctivae normal.   Cardiovascular:      Rate and Rhythm: Normal rate and regular rhythm.      Heart sounds: Normal heart sounds.   Pulmonary:      Effort: " Pulmonary effort is normal.      Breath sounds: Normal breath sounds.   Abdominal:      General: Abdomen is flat. Bowel sounds are normal.      Palpations: Abdomen is soft.      Comments: Diffusely tender throughout   Musculoskeletal:      Comments: Has a pretty deep large laceration left anterior thigh.  It is dressed with gauze and not bleeding at this time.   Skin:     General: Skin is warm and dry.   Neurological:      Mental Status: She is alert and oriented to person, place, and time.   Psychiatric:         Behavior: Behavior normal.         ED Course              ED Course as of 07/02/23 0704   Sat Jun 17, 2023   1638 Patient here with recent CABG complaining of pain between her shoulder blades that prompted her to take one of her nitro.  Certainly concern for cardiac issues given this.  Then after her nitro she fell with significant laceration to her left anterior thigh and arrives quite hypotensive.  Partial trauma was called by nursing staff on arrival.  We are going to get some labs including a type and screen.  Given fluid bolus.  We will work-up both for chest pain and for trauma with CT scan of head given her age and the fact that she is on a blood thinners.   1718 Patient's bleeding has been controlled and I do not see any active bleeding, however hemoglobin is below her baseline at 8.3.  She was 11.6 back in April.  She remains quite hypotensive, only minimally responding to fluid bolus.  We will transfuse 2 units.   1948 Troponin T, High Sensitivity(!): 23  Unchanged   2357 Possible acute fracture of the lateral aspect of the right 11th rib. Acute   fractures of the medial aspect of the right 11th and 12th ribs.  Acute fracture   of the anterior aspect of the left 6th and 7th ribs.  No flail segment.   2357 IMPRESSION:   No acute traumatic injury of the abdomen and pelvis.      No acute pathology of the abdomen or pelvis.      Procedures         [facility] Procedure Note        Laceration  Repair:    Performed by: Alfredo Vargas MD  Authorized by: Alfredo Vargas MD    Preparation: Patient was prepped and draped in usual sterile fashion.  Irrigation solution: saline    Body area:right thigh  Laceration length: 8cm  Contamination: The wound is not contaminated.  Foreign bodies:none  Tendon involvement: none  Anesthesia: Local  Local anesthetic: Bupivacaine 0.5%, Lidocaine     1%  Anesthetic total: 20 ml    Debridement: none  Skin closure: Closed with combination of 4 deep 4-0 Vicryl and combination of interrupted and running 4-0 Ethilon superficial sutures.  Approximation: close  Approximation difficulty: intermediate (layered closure or heavily contaminated)    Patient tolerance: Patient tolerated the procedure well with no immediate complications.         No results found for this or any previous visit (from the past 24 hour(s)).    Medications   0.9% sodium chloride BOLUS (0 mLs Intravenous Stopped 6/18/23 0558)   lidocaine 1% with EPINEPHrine 1:100,000 injection 20 mL (20 mLs Intradermal $Given 6/17/23 1743)   phytonadione (K1-1000) capsule 10 mg (10 mg Oral $Given 6/17/23 1953)   fentaNYL (PF) (SUBLIMAZE) injection 50 mcg (50 mcg Intravenous $Given 6/17/23 2040)   HYDROmorphone (PF) (DILAUDID) injection 0.5 mg (0.5 mg Intravenous $Given 6/18/23 0835)       Assessments & Plan (with Medical Decision Making)     I have reviewed the nursing notes.    I have reviewed the findings, diagnosis, plan and need for follow up with the patient.  Patient here after having fallen at home.  She fell after having taken a nitro for some pain between her shoulder blades.  Initial EKG is unremarkable.  Initial troponin 24.  There is a serial troponin that is currently pending at this time.  She also suffered a laceration to her right thigh which is fairly substantial.  She is on Coumadin and an antiplatelet and bled a fair amount.  Hemoglobin was 8.3 and she was hypotensive in the 73 systolic range on arrival.  She did  respond initially to his normal saline bolus and pressures came up to 97.  Bleeding is controlled and the laceration has not been repaired.  She is getting 2 units of typed and crossed blood at this time.  Also seems to have some acute renal insufficiency.  Last basic metabolic panel we have here showed a GFR of greater than 90.  In reviewing the labs from [facility], it looks like her creatinine has been running around 1.0, today it is 1.21.    I have spoken with Dr. Ny, hospitalist here at [facility].  I have also spoken with Giuliana Small from general surgery as there was some trauma involved with this case and it was a partial trauma code.  Patient is appearing stable at this time, I am going to repeat a second troponin and she is getting 2 units of blood.  If the second troponin is stable and her blood pressures improved with the blood, she will be admitted here for observation.  If however troponin goes up significantly or pressures do not improve with the blood may have to consider transfer to higher level of care.  Is currently change of shift, and care patient will be turned over to night shift at this time.      Discharge Medication List as of 6/18/2023 10:20 AM          Final diagnoses:   Closed fracture of multiple ribs of both sides, initial encounter   Ground-level fall   Gastrointestinal hemorrhage, unspecified gastrointestinal hemorrhage type   Anemia due to blood loss, acute   Syncope and collapse       6/17/2023   [facility] AND [facility] [place]     Alfredo Vargas MD  06/17/23 2610       Alfredo Vargas MD  07/02/23 0771

## 2023-06-18 VITALS
WEIGHT: 170 LBS | RESPIRATION RATE: 13 BRPM | HEART RATE: 75 BPM | SYSTOLIC BLOOD PRESSURE: 99 MMHG | DIASTOLIC BLOOD PRESSURE: 48 MMHG | OXYGEN SATURATION: 98 % | BODY MASS INDEX: 30.12 KG/M2 | HEIGHT: 63 IN | TEMPERATURE: 97.2 F

## 2023-06-18 PROCEDURE — 96375 TX/PRO/DX INJ NEW DRUG ADDON: CPT | Mod: XU | Performed by: EMERGENCY MEDICINE

## 2023-06-18 PROCEDURE — 258N000003 HC RX IP 258 OP 636: Performed by: EMERGENCY MEDICINE

## 2023-06-18 PROCEDURE — 99285 EMERGENCY DEPT VISIT HI MDM: CPT | Mod: 25 | Performed by: SURGERY

## 2023-06-18 PROCEDURE — 250N000011 HC RX IP 250 OP 636: Performed by: PHYSICIAN ASSISTANT

## 2023-06-18 PROCEDURE — 96361 HYDRATE IV INFUSION ADD-ON: CPT | Performed by: EMERGENCY MEDICINE

## 2023-06-18 RX ORDER — HYDROMORPHONE HYDROCHLORIDE 1 MG/ML
0.5 INJECTION, SOLUTION INTRAMUSCULAR; INTRAVENOUS; SUBCUTANEOUS ONCE
Status: COMPLETED | OUTPATIENT
Start: 2023-06-18 | End: 2023-06-18

## 2023-06-18 RX ADMIN — SODIUM CHLORIDE: 9 INJECTION, SOLUTION INTRAVENOUS at 06:01

## 2023-06-18 RX ADMIN — HYDROMORPHONE HYDROCHLORIDE 0.5 MG: 1 INJECTION, SOLUTION INTRAMUSCULAR; INTRAVENOUS; SUBCUTANEOUS at 08:35

## 2023-06-18 ASSESSMENT — ACTIVITIES OF DAILY LIVING (ADL)
ADLS_ACUITY_SCORE: 35

## 2023-06-18 NOTE — ED PROVIDER NOTES
Emergency Department Transfer of Care Note    Assessment / Plan / Medical Decision Making   Agree with previous provider's plan and exam.     Transfer of Care Plan:  73-year-old female with a history of CAD, COPD who presents after a fall.  Recently underwent CABG several months ago.  Apparently felt presyncopal after taking nitro prior to her collapse.  Came in as a trauma initially and hypotensive 70s over 30s.  Laboratory work-up thus far notable for positive Hemoccult, 2.5 g hemoglobin drop since last measure, negative head CT and chest x-ray, pending CT of the chest, abdomen, and pelvis and right shoulder x-ray.  Patient is on warfarin.  She suffered a laceration over her left thigh s/p repair.     ED Course as of 06/18/23 0127   Sat Jun 17, 2023 1948 Troponin T, High Sensitivity(!): 23  Unchanged   2357 Possible acute fracture of the lateral aspect of the right 11th rib. Acute   fractures of the medial aspect of the right 11th and 12th ribs.  Acute fracture   of the anterior aspect of the left 6th and 7th ribs.  No flail segment.   2357 IMPRESSION:   No acute traumatic injury of the abdomen and pelvis.      No acute pathology of the abdomen or pelvis.      Spoke with Dr. Morse of trauma surgery and Dr. Griffith of the ER.  They have graciously excepted and transferred to the ER for this patient.  At this point time she is remained hemodynamically stable blood pressures in the 90s over 50s.  Continues to sat well on room air with normal respiratory effort despite multiple rib fractures as above.    New Prescriptions    No medications on file       Final diagnoses:   Closed fracture of multiple ribs of both sides, initial encounter   Ground-level fall   Gastrointestinal hemorrhage, unspecified gastrointestinal hemorrhage type   Anemia due to blood loss, acute   Syncope and collapse       Serjio Dunham MD  6/17/2023   [facility] AND [facility]     Serjio Dunham MD  06/18/23 0128

## 2023-06-18 NOTE — CONSULTS
[facility]    Surgical Consultation for Trauma    Date of Admission:  6/17/2023    Assessment & Plan   Natalee Montalvo is a 73 year old female who was admitted on 6/17/2023. I was asked to see the patient for fall with multiple rib fractures, anticoagulated.    Principal Problem:    Hypovolemic shock (H)    Assessment: blood loss anemia-likely acute GI bleed on chronic anemia before fall occurred.    Plan: transfer in progress, awaiting bed. Hemodynamically stable after transfusion.   Active Problems:    CAD (coronary artery disease)    Assessment: chronic    Plan: transfer    COPD (chronic obstructive pulmonary disease) (H)    Assessment: chronic     Plan: transfer    Alcoholism (H)    Assessment: chronic    Plan: monitor for withdrawal    Anemia due to blood loss, acute    Assessment: on chronic    Plan: monitor    Elevated troponin    Assessment: stable    Plan: monitor    Acute pain of right shoulder    Assessment: no fracture on XR    Plan: consider further evaluation when stable    Thigh laceration    Assessment: acute    Plan: approximated, stable hemoglobin    Fall    Assessment: acute likely related to anemia, nitro    Plan: monitor  Mulitple RIB fractures-   Acute -right and left, no flail segment. Patient denies pain with breathing. Continue on oxygen and pain medications as needed. Awaiting transfer to higher level of care.     DVT Prophylaxis: on coumadin, bleeding, INR 2.38 on 6/17/23  Code Status: Prior    Giuliana Small MD    Reason for Consult   Reason for consult: I was asked by Dr. Vargas to evaluate this patient for trauma, rib fractures and fall on anticoagulation.    Primary Care Physician   TERE GRIDER    Chief Complaint   fall    History is obtained from the patient and chart review    History of Present Illness   Natalee Montalvo is a 73 year old female who presents with left thigh laceration after a fall. Before the fall she had shoulder pain and took a  nitroglycerin. She got really dizzy and layed down for a few minutes. She she tried to get up, she used her walker. She got dizzy and fell. She cut her thigh on her walker. She noted some ongoing right shoulder pain. She is on coumadin and prasugrel.   She was found to be acutely anemic in the ED. Her thigh laceration was repaired. She was hypotensive and responded transiently to IVF bolus. She got a transfusion of PRBC that helped to keep her hemodynamically stable. Occult blood + stool.  She had CT scan showing left and right acute rib fractures as well as healed rib fractures. No PTX or hemothorax.   Troponin was elevated at 24, remained stable at 23 on recheck.   Transfer arranged to trauma service at [facility], awaiting bed and crew for ALS transfer.   Patient denies new complaints.   Has no nausea or vomiting. Right shoulder still hurts-no fracture on XR.    Past Medical History    I have reviewed this patient's medical history and updated it with pertinent information if needed.   Past Medical History:   Diagnosis Date     Alcoholism (H) 4/1/2019     CAD (coronary artery disease) 4/1/2019     COPD (chronic obstructive pulmonary disease) (H) 4/1/2019       Past Surgical History   I have reviewed this patient's surgical history and updated it with pertinent information if needed.  No past surgical history on file. Reviewed on CareEverywhere.    Prior to Admission Medications   Prior to Admission Medications   Prescriptions Last Dose Informant Patient Reported? Taking?   albuterol (PROAIR HFA/PROVENTIL HFA/VENTOLIN HFA) 108 (90 BASE) MCG/ACT Inhaler  Self Yes No   Sig: Inhale 2 puffs into the lungs every 4 hours as needed   aspirin (ASA) 81 MG chewable tablet  Self Yes No   Sig: Take 81 mg by mouth daily   atorvastatin (LIPITOR) 80 MG tablet  Self Yes No   Sig: Take 80 mg by mouth daily    budesonide-formoterol (SYMBICORT) 160-4.5 MCG/ACT Inhaler  Self Yes No   Sig: Inhale 2 puffs into the lungs 2 times daily    doxycycline hyclate (VIBRA-TABS) 100 MG tablet   No No   Sig: Take 1 tablet (100 mg) by mouth 2 times daily   fluticasone (FLONASE) 50 MCG/ACT nasal spray  Self Yes No   Sig: Spray 2 sprays into both nostrils daily   folic acid (FOLVITE) 1 MG tablet   No No   Sig: Take 1 tablet (1 mg) by mouth daily   furosemide (LASIX) 20 MG tablet  Self Yes No   Sig: Take 20 mg by mouth daily   ipratropium - albuterol 0.5 mg/2.5 mg/3 mL (DUONEB) 0.5-2.5 (3) MG/3ML neb solution  Self Yes No   Sig: Take 1 vial by nebulization every 4 hours as needed for shortness of breath / dyspnea or wheezing   lisinopril (PRINIVIL/ZESTRIL) 5 MG tablet  Self Yes No   Sig: Take 5 mg by mouth daily   metoprolol succinate ER (TOPROL-XL) 100 MG 24 hr tablet  Self Yes No   Sig: Take 100 mg by mouth daily   multivitamin, therapeutic (THERA-VIT) TABS tablet  Self Yes No   Sig: Take 1 tablet by mouth daily   nitroGLYcerin (NITROSTAT) 0.4 MG sublingual tablet  Self Yes No   Sig: Place 0.4 mg under the tongue every 5 minutes as needed for chest pain For chest pain place 1 tablet under the tongue every 5 minutes for 3 doses. If symptoms persist 5 minutes after 1st dose call 911.   omeprazole (PRILOSEC) 20 MG DR capsule  Self Yes No   Sig: Take 20 mg by mouth daily   order for DME   No No   Sig: Equipment being ordered: [employer] ()  Treatment Diagnosis: weakness. COPD   saccharomyces boulardii (FLORASTOR) 250 MG capsule   No No   Sig: Take 1 capsule (250 mg) by mouth 2 times daily   silver sulfADIAZINE (SILVADENE) 1 % external cream  Self Yes No   Sig: Apply topically as needed   vitamin B1 (THIAMINE) 100 MG tablet   No No   Sig: Take 1 tablet (100 mg) by mouth daily      Facility-Administered Medications: None     Allergies   Allergies   Allergen Reactions     Amoxicillin-Pot Clavulanate      Other reaction(s): GI intolerance  Stomach cramps     Bupropion      Other reaction(s): Other  Insomnia and tension     Nicotine Rash     Oxycodone Nausea      Pseudoephedrine      Other reaction(s): Increased blood pressure, Tachycardia  Advise not to use this medication which was in her allergy product.     Sertraline Rash       Social History   I have reviewed this patient's social history and updated it with pertinent information if needed. Natalee Montalvo  reports that she has been smoking cigarettes. She has been smoking an average of .5 packs per day. She has never used smokeless tobacco. She reports current alcohol use. She reports that she does not use drugs.    Family History   I have reviewed this patient's family history and updated it with pertinent information if needed.   No family history on file.    REVIEW OF SYSTEMS  GENERAL: No fevers or chills. Denies fatigue, recent weight loss.  HEENT: No sinus drainage. No changes with vision or hearing. No difficulty swallowing.   LYMPHATICS:  No swollen nodes in axilla, neck or groin.  CARDIOVASCULAR: Has dyspnea on exertion.  PULMONARY: No increased shortness of breath or cough. No increase in sputum production.  GI: Has had some melena, no bright red blood in stools. No hematemesis. No constipation or diarrhea.  : No dysuria or hematuria.  SKIN: No recent rashes or ulcers. Has healing left arm injury from recent fall.  HEMATOLOGY:  Has very easy bruising and bleeding.  ENDOCRINE:  No history of diabetes or thyroid problems.  NEUROLOGY:  No history of seizures or headaches. No motor or sensory changes.    Physical Exam   Temp: 97.2  F (36.2  C) Temp src: Tympanic BP: 99/48 Pulse: 75   Resp: 13 SpO2: 98 %      Vital Signs with Ranges  Temp:  [97  F (36.1  C)-97.2  F (36.2  C)] 97.2  F (36.2  C)  Pulse:  [75-89] 75  Resp:  [9-25] 13  BP: ()/(37-58) 99/48  SpO2:  [94 %-100 %] 98 %  170 lbs 0 oz    Constitutional: NAD, pleasant and cooperative  HEENT: normocephalic, no icterus, no nasal drainage, no oral lesions, mucus membranes pink and moist  Respiratory: lungs clear and diminished to ausculation  bilaterally, no respiratory distress  Cardiovascular: heart regular rate and rhythm  Abdomen: bowel sounds +, soft and non-distended, no tenderness, no peritoneal signs  Lymph/Hematologic: No axillary or cervical adenopathy  Skin: pink, warm and dry. Multiple healing bruises and skin tears. Left thigh with laceration, well approximated no ongoing bleeding. Left arm with dressing in place on healing laceration.  Musculoskeletal: calves soft and non-tender, venous stasis changes  Neurologic: grossly intact, AAO x 3  Psychiatric: appropriate affect    Data   -Data reviewed today: All pertinent laboratory and imaging results from this encounter were reviewed. I personally reviewed the chest CT image(s) showing right and left acute rib fractures, no ptx, no hemothorax.  Recent Labs   Lab 06/17/23  1900 06/17/23  1646   WBC  --  5.2   HGB 8.0* 8.3*   MCV  --  103*   PLT  --  136*   INR  --  2.38*   NA  --  133*   POTASSIUM  --  4.7   CHLORIDE  --  95*   CO2  --  24   BUN  --  64.8*   CR  --  1.05*   ANIONGAP  --  14   FELICIANO  --  8.8   GLC  --  127*   ALBUMIN  --  4.1   PROTTOTAL  --  6.1*   BILITOTAL  --  <0.2   ALKPHOS  --  92   ALT  --  27   AST  --  33       Recent Results (from the past 24 hour(s))   XR Chest Port 1 View    Narrative    Exam:  XR CHEST PORT 1 VIEW    HISTORY: chest pain.    COMPARISON:  4/4/2019    FINDINGS:     The cardiomediastinal contours are normal.      No focal consolidation, effusion, or pneumothorax.      No acute osseous abnormality. Prior median sternotomy.      Impression    IMPRESSION:      No acute cardiopulmonary process.      ROB SCHRADER MD         SYSTEM ID:  [id number]   CT Head w/o Contrast    Narrative    Exam: CT HEAD W/O CONTRAST      Exam reason: fall    Technique:   Axial images of the head obtained without contrast. Coronal and  sagittal reformations were generated. This CT was performed using one  or more of the following dose reduction techniques: automated  exposure  control, adjustment of the mA and/or kV according to patient size,  and/or use of iterative reconstruction technique.    Comparison: 4/1/2019       Findings:      Parenchyma: No evidence of intraparenchymal hemorrhage, mass, acute  cortical infarct or prior infarct in a major vascular territory.      No midline shift. The basilar cisterns are patent.    Extra-axial spaces: No extra-axial fluid collection or hemorrhage.     Ventricles: Normal.  Paranasal sinuses: There is mucosal thickening in the left maxillary  sinus. Sinuses are otherwise essentially clear.   Mastoid air cells: Clear.    Osseous: No acute findings.  Orbits: Normal.    Soft tissues: Unremarkable.       Impression    Impression:  No acute intracranial abnormality.      ROB SCHRADER MD         SYSTEM ID:  [id number]   XR Shoulder Right 2 Views    Narrative    PROCEDURE INFORMATION:   Exam: XR Right Shoulder   Exam date and time: 6/17/2023 10:59 PM   Age: 73 years old   Clinical indication: Pain; Shoulder; Right; Additional info: Shoulder pain,   fall     TECHNIQUE:   Imaging protocol: Radiologic exam of the right shoulder.   Views: 2 or more views.     COMPARISON:   CT CHEST ABDOMEN PELVIS W/O CONTRAST 6/17/2023 10:38 PM     FINDINGS:   Bones/joints: Osteolysis of the distal clavicle. Degenerative change with   multiple osteophytes and narrowing of the subacromial space. No acute fracture   or dislocation. Multiple old right rib fractures noted.   Soft tissues: Normal.       Impression    IMPRESSION:   Moderate to severe degenerative changes as described above.    No acute fracture.  No dislocation.    THIS DOCUMENT HAS BEEN ELECTRONICALLY SIGNED BY SEB GILLIAM MD   CT Chest Abdomen Pelvis w/o Contrast    Narrative    PROCEDURE INFORMATION:   Exam: CT Chest Without Contrast; Diagnostic   Exam date and time: 6/17/2023 10:38 PM   Age: 73 years old   Clinical indication: Injury or trauma; Laceration; Foreign body involvement not    specified; Generalized, abdominal; Additional info: Eval for acute blood loss   anemia with fall and thigh laceration. On warfarin.     TECHNIQUE:   Imaging protocol: Diagnostic computed tomography of the chest without contrast.   3D rendering (Not supervised by radiologist): MIP and/or 3D reconstructed   images were created by the technologist.   Radiation optimization: All CT scans at this facility use at least one of these   dose optimization techniques: automated exposure control; mA and/or kV   adjustment per patient size (includes targeted exams where dose is matched to   clinical indication); or iterative reconstruction.     REPORTING DATA:   Count of CT and Cardiac NM exams in prior 12 months: This patient has received   0 known CTs and 0 known cardiac nuclear medicine studies in the 12 months prior   to the current study.     COMPARISON:   CR XR CHEST PORT 1 VIEW 6/17/2023 4:41 PM     FINDINGS:   Lungs: Moderate centrilobular emphysematous changes are present. No pulmonary   contusion or pneumatocele. Bibasal atelectasis.   Pleural spaces: Unremarkable. No pneumothorax. No pleural effusion.   Heart: Unremarkable. No cardiomegaly. No pericardial effusion.   Coronary arteries: Coronary artery calcification.   Lymph nodes: Unremarkable. No enlarged lymph nodes.   Vasculature: Unremarkable. No aortic aneurysm.      Bones/joints: Median sternotomy noted. Multiple old right rib fractures. Old   left rib fractures noted. Acute fracture of the anterior aspect of the left 6th   and 7th ribs. Possible acute fracture of the lateral aspect of the right 11th   rib. Acute fractures of the medial aspect of the right 11th and 12th ribs.   Diffuse degenerative change of the thoracic spine. No compression fracture.   Soft tissues: Unremarkable.       Impression    IMPRESSION:   No pulmonary contusion, pneumatocele or pneumothorax.    Possible acute fracture of the lateral aspect of the right 11th rib. Acute   fractures of  the medial aspect of the right 11th and 12th ribs.  Acute fracture   of the anterior aspect of the left 6th and 7th ribs.  No flail segment.    COMMENTS:   In the absence of a history or active diagnosis of lung cancer, it is   recommended that this patient with emphysema be evaluated for enrollment in a   low dose CT lung cancer screening program.       =========================  PROCEDURE INFORMATION:   Exam: CT Abdomen And Pelvis Without Contrast   Exam date and time: 6/17/2023 10:38 PM   Age: 73 years old   Clinical indication: Injury or trauma; Laceration; Foreign body involvement not   specified; Generalized, abdominal; Additional info: Eval for acute blood loss   anemia with fall and thigh laceration. On warfarin.     TECHNIQUE:   Imaging protocol: Computed tomography of the abdomen and pelvis without   contrast.   3D rendering (Not supervised by radiologist): MIP and/or 3D reconstructed   images were created by the technologist.   Radiation optimization: All CT scans at this facility use at least one of these   dose optimization techniques: automated exposure control; mA and/or kV   adjustment per patient size (includes targeted exams where dose is matched to   clinical indication); or iterative reconstruction.     REPORTING DATA:   Count of CT and Cardiac NM exams in prior 12 months: This patient has received   0 known CTs and 0 known cardiac nuclear medicine studies in the 12 months prior   to the current study.     COMPARISON:   CR XR CHEST PORT 1 VIEW 6/17/2023 4:41 PM     FINDINGS:   Liver: Normal. No mass.   Gallbladder and bile ducts: Normal. No calcified stones. No ductal dilation.   Pancreas: Normal. No ductal dilation.   Spleen: Normal. No splenomegaly.   Adrenal glands: Normal. No mass.   Kidneys and ureters: Small left intrarenal small stones. No hydronephrosis of   either kidney. No traumatic injury of the kidneys.   Stomach and bowel: Unremarkable. No obstruction. No mucosal thickening.    Appendix: No evidence of appendicitis.     Intraperitoneal space: Unremarkable. No free air. No significant fluid   collection.   Vasculature: The aorta and other arteries demonstrate diffuse severe   atherosclerotic calcification.   Lymph nodes: Unremarkable. No enlarged lymph nodes.   Urinary bladder: Unremarkable as visualized.   Reproductive: Unremarkable as visualized.   Bones/joints: Status post right total hip arthroplasty.  No acute fracture.  Soft tissues: Unremarkable.     IMPRESSION:   No acute traumatic injury of the abdomen and pelvis.     No acute pathology of the abdomen or pelvis.     THIS DOCUMENT HAS BEEN ELECTRONICALLY SIGNED BY SEB GILLIAM MD

## 2023-06-19 LAB
ATRIAL RATE - MUSE: 77 BPM
DIASTOLIC BLOOD PRESSURE - MUSE: NORMAL MMHG
INTERPRETATION ECG - MUSE: NORMAL
P AXIS - MUSE: 76 DEGREES
PR INTERVAL - MUSE: 134 MS
QRS DURATION - MUSE: 86 MS
QT - MUSE: 414 MS
QTC - MUSE: 468 MS
R AXIS - MUSE: 60 DEGREES
SYSTOLIC BLOOD PRESSURE - MUSE: NORMAL MMHG
T AXIS - MUSE: 72 DEGREES
VENTRICULAR RATE- MUSE: 77 BPM

## 2023-06-22 ENCOUNTER — ANESTHESIA (OUTPATIENT)
Dept: EMERGENCY MEDICINE | Facility: OTHER | Age: 73
End: 2023-06-22
Payer: MEDICARE

## 2023-06-22 ENCOUNTER — HOSPITAL ENCOUNTER (EMERGENCY)
Facility: OTHER | Age: 73
Discharge: HOME | End: 2023-06-22
Attending: INTERNAL MEDICINE | Admitting: INTERNAL MEDICINE
Payer: MEDICARE

## 2023-06-22 ENCOUNTER — ANESTHESIA EVENT (OUTPATIENT)
Dept: EMERGENCY MEDICINE | Facility: OTHER | Age: 73
End: 2023-06-22
Payer: MEDICARE

## 2023-06-22 VITALS
SYSTOLIC BLOOD PRESSURE: 84 MMHG | WEIGHT: 180 LBS | OXYGEN SATURATION: 95 % | RESPIRATION RATE: 14 BRPM | DIASTOLIC BLOOD PRESSURE: 37 MMHG | HEIGHT: 63 IN | BODY MASS INDEX: 31.89 KG/M2 | TEMPERATURE: 98.2 F | HEART RATE: 75 BPM

## 2023-06-22 DIAGNOSIS — S71.112D: ICD-10-CM

## 2023-06-22 DIAGNOSIS — D62 ANEMIA DUE TO BLOOD LOSS, ACUTE: ICD-10-CM

## 2023-06-22 DIAGNOSIS — R03.1 LOW BLOOD PRESSURE READING: ICD-10-CM

## 2023-06-22 LAB
ABO/RH(D): NORMAL
ALBUMIN SERPL BCG-MCNC: 4 G/DL (ref 3.5–5.2)
ALP SERPL-CCNC: 86 U/L (ref 35–104)
ALT SERPL W P-5'-P-CCNC: 20 U/L (ref 0–50)
ANION GAP SERPL CALCULATED.3IONS-SCNC: 11 MMOL/L (ref 7–15)
ANTIBODY SCREEN: NEGATIVE
AST SERPL W P-5'-P-CCNC: 25 U/L (ref 0–45)
BASOPHILS # BLD AUTO: 0 10E3/UL (ref 0–0.2)
BASOPHILS NFR BLD AUTO: 0 %
BILIRUB SERPL-MCNC: 0.4 MG/DL
BUN SERPL-MCNC: 47.4 MG/DL (ref 8–23)
CALCIUM SERPL-MCNC: 8.9 MG/DL (ref 8.8–10.2)
CHLORIDE SERPL-SCNC: 91 MMOL/L (ref 98–107)
CREAT SERPL-MCNC: 1.52 MG/DL (ref 0.51–0.95)
DEPRECATED HCO3 PLAS-SCNC: 27 MMOL/L (ref 22–29)
EOSINOPHIL # BLD AUTO: 0.2 10E3/UL (ref 0–0.7)
EOSINOPHIL NFR BLD AUTO: 2 %
ERYTHROCYTE [DISTWIDTH] IN BLOOD BY AUTOMATED COUNT: 18.4 % (ref 10–15)
GFR SERPL CREATININE-BSD FRML MDRD: 35 ML/MIN/1.73M2
GLUCOSE SERPL-MCNC: 84 MG/DL (ref 70–99)
HCT VFR BLD AUTO: 27.1 % (ref 35–47)
HGB BLD-MCNC: 8.8 G/DL (ref 11.7–15.7)
HOLD SPECIMEN: NORMAL
IMM GRANULOCYTES # BLD: 0 10E3/UL
IMM GRANULOCYTES NFR BLD: 0 %
LYMPHOCYTES # BLD AUTO: 1.5 10E3/UL (ref 0.8–5.3)
LYMPHOCYTES NFR BLD AUTO: 23 %
MCH RBC QN AUTO: 33.5 PG (ref 26.5–33)
MCHC RBC AUTO-ENTMCNC: 32.5 G/DL (ref 31.5–36.5)
MCV RBC AUTO: 103 FL (ref 78–100)
MONOCYTES # BLD AUTO: 0.8 10E3/UL (ref 0–1.3)
MONOCYTES NFR BLD AUTO: 12 %
NEUTROPHILS # BLD AUTO: 4.2 10E3/UL (ref 1.6–8.3)
NEUTROPHILS NFR BLD AUTO: 63 %
NRBC # BLD AUTO: 0 10E3/UL
NRBC BLD AUTO-RTO: 0 /100
NT-PROBNP SERPL-MCNC: 1499 PG/ML (ref 0–900)
PLATELET # BLD AUTO: 152 10E3/UL (ref 150–450)
POTASSIUM SERPL-SCNC: 5 MMOL/L (ref 3.4–5.3)
PROCALCITONIN SERPL IA-MCNC: 0.24 NG/ML
PROT SERPL-MCNC: 6.9 G/DL (ref 6.4–8.3)
RBC # BLD AUTO: 2.63 10E6/UL (ref 3.8–5.2)
SODIUM SERPL-SCNC: 129 MMOL/L (ref 136–145)
SPECIMEN EXPIRATION DATE: NORMAL
WBC # BLD AUTO: 6.7 10E3/UL (ref 4–11)

## 2023-06-22 PROCEDURE — 83880 ASSAY OF NATRIURETIC PEPTIDE: CPT | Performed by: INTERNAL MEDICINE

## 2023-06-22 PROCEDURE — 86901 BLOOD TYPING SEROLOGIC RH(D): CPT | Performed by: INTERNAL MEDICINE

## 2023-06-22 PROCEDURE — 36415 COLL VENOUS BLD VENIPUNCTURE: CPT | Performed by: INTERNAL MEDICINE

## 2023-06-22 PROCEDURE — 86850 RBC ANTIBODY SCREEN: CPT | Performed by: INTERNAL MEDICINE

## 2023-06-22 PROCEDURE — 258N000003 HC RX IP 258 OP 636: Performed by: INTERNAL MEDICINE

## 2023-06-22 PROCEDURE — 99283 EMERGENCY DEPT VISIT LOW MDM: CPT | Mod: 25 | Performed by: INTERNAL MEDICINE

## 2023-06-22 PROCEDURE — 36410 VNPNXR 3YR/> PHY/QHP DX/THER: CPT | Performed by: NURSE ANESTHETIST, CERTIFIED REGISTERED

## 2023-06-22 PROCEDURE — 96360 HYDRATION IV INFUSION INIT: CPT | Performed by: INTERNAL MEDICINE

## 2023-06-22 PROCEDURE — 99284 EMERGENCY DEPT VISIT MOD MDM: CPT | Performed by: INTERNAL MEDICINE

## 2023-06-22 PROCEDURE — 84145 PROCALCITONIN (PCT): CPT | Performed by: INTERNAL MEDICINE

## 2023-06-22 PROCEDURE — 80053 COMPREHEN METABOLIC PANEL: CPT | Performed by: INTERNAL MEDICINE

## 2023-06-22 PROCEDURE — 85025 COMPLETE CBC W/AUTO DIFF WBC: CPT | Performed by: INTERNAL MEDICINE

## 2023-06-22 RX ADMIN — SODIUM CHLORIDE 500 ML: 9 INJECTION, SOLUTION INTRAVENOUS at 20:14

## 2023-06-22 ASSESSMENT — ACTIVITIES OF DAILY LIVING (ADL)
ADLS_ACUITY_SCORE: 35
ADLS_ACUITY_SCORE: 35

## 2023-06-23 NOTE — ED PROVIDER NOTES
Emergency Department Provider Note  : 1950 Age: 73 year old Sex: female MRN: [id number]    Chief Complaint   Patient presents with     Dizziness     Generalized Weakness       Medical Decision Making / Assessment / Plan   73 year old female presenting with low blood pressure reading, anemia due to acute blood loss, left thigh laceration subsequent encounter    ED Course as of 234   Thu 2023   1856 Patient evaluated.  Currently stable, prone in emergency room cot.  States that she took her normal blood pressure medications at home medications this morning, did not change her medications as instructed after discharge from the hospital yesterday.  She was supposed to have a home care nurse come out tomorrow to update/review her medications.     Had pressures of 70s/40s at home today, called nurse-online and advised to come in for evaluation.     Follow-up blood pressure 87/47.  Pulse 75.    Type and screen ordered.  500 mL saline bolus ordered.    CBC shows white count of 6.7, hemoglobin 8.8, , platelet count 152,000.    Procalcitonin 0.24.  Low risk of systemic infection.  Antibiotics discouraged.    BNP 1499    Sodium 129, chloride 91, creatinine 1.52, potassium 5.0, liver enzymes normal    Blood type is a positive, antibody screen negative.    Patient reports feeling stable.  She needs to use the restroom.  Plan to assess her stability and mobility.    Patient did very well getting up to the bathroom.  She was not wobbly or unstable.  Plan to start oral iron supplements.  Home care nurse will be heading out to the home tomorrow.  Hold her warfarin tonight.  Okay to use topical bacitracin to the left leg wound.  Follow-up as needed for new or worsening symptoms.        A shared decision making model was used. Plan and all results were discussed  Time was taken to answer all questions. Patient and/or associated parties understood and were  agreeable to treatment plan.  Strict return to Emergency Department precautions as well as appropriate follow up instructions were provided. The patient was discharged in stable condition.    New Prescriptions    FERROUS FUMARATE 65 MG, [place]. FE,-VITAMIN C 125 MG (VITRON C)  MG TABS TABLET    Take 1 tablet by mouth daily on empty stomach -for iron deficiency       Final diagnoses:   Low blood pressure reading   Anemia due to blood loss, acute   Thigh laceration, left, subsequent encounter       Chance Espino MD  6/22/2023   Emergency Department    Subjective   Natalee is a 73 year old female who presents at  6:41 PM with low blood pressure reading.  Took her normal medications that were unchanged since her recent hospitalization and discharge.    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  Also had laceration repair of her left thigh during recent hospitalization.  States that she was transfused at least 2 units of packed red blood cells.    She has home care nurse come out tomorrow to review and update her medication list.  She took all of her prehospitalization medications today and subsequently developed lightheadedness and dizziness and low blood pressure readings.  Subsequently was advised to come into the emergency room for evaluation.    She had a normal bowel movement today.  She was having melanotic stools previously.    Denies chest pain, fevers or chills.  Some fatigue and weakness, tiredness.  Slight increased shortness of breath and baseline.  She is oxygen dependent due to her COPD.    States that she required IV Lasix on Saturday while in the hospital and lost almost 10 pounds.    I have reviewed the Medications, Allergies, Past Medical and Surgical History, and Social History in the [employer] and with family.    Review of Systems:  Please see Subjective / HPI for pertinent positives and negatives. All other systems reviewed  "and found to be negative.      Objective     Patient Vitals for the past 24 hrs:   BP Temp Temp src Pulse Resp SpO2 Height Weight   06/22/23 2205 -- -- -- 75 14 95 % -- --   06/22/23 2150 (!) 84/37 -- -- 74 12 96 % -- --   06/22/23 2135 (!) 80/38 -- -- 72 11 96 % -- --   06/22/23 2120 (!) 79/38 -- -- 74 10 96 % -- --   06/22/23 2105 90/42 -- -- 73 10 94 % -- --   06/22/23 2050 (!) 89/49 -- -- 76 20 91 % -- --   06/22/23 2030 92/47 -- -- 75 17 -- -- --   06/22/23 2015 (!) 89/46 -- -- 75 14 -- -- --   06/22/23 2000 (!) 87/47 -- -- 75 13 92 % -- --   06/22/23 1945 95/43 -- -- 76 18 -- -- --   06/22/23 1930 -- -- -- 72 15 -- -- --   06/22/23 1915 -- -- -- 80 20 -- -- --   06/22/23 1900 -- -- -- 75 13 -- -- --   06/22/23 1846 90/61 98.2  F (36.8  C) Tympanic 75 15 90 % 1.6 m (5' 3\") 81.6 kg (180 lb)       Physical Exam:     General: Awake, alert, in no acute respiratory distress. Pale.  Head: Normocephalic, atraumatic.  Eyes: Conjugate gaze.  ENT: Moist membranes, external ear appears normal.   Chest/Respiratory: Equal chest rise, few scattered wheezes, prolonged expiratory phase.  Cardiovascular: Peripheral pulses present, regular rate.  Abdominal: Soft, non-distended, non-tender.  Extremities: No obvious bony deformity.  Recent laceration of left thigh, currently suture.  Slight odor.  No obvious signs of cellulitis.  Neurological: GCS 15, moving all extremities without gross deficit.  Skin: Warm, somewhat pale.  Sutured V-shaped laceration on left thigh.  Psychiatric: Appropriate affect.     Procedures / Critical Care   Procedures    Aggregate Critical Care Time: None.     Orders Placed This Encounter   Procedures     Comprehensive metabolic panel     Nt probnp inpatient (BNP)     Procalcitonin     CBC with platelets and differential     Extra Tube     Extra Serum Separator Tube (SST)     Extra Green Top (Lithium Heparin) ON ICE     Extra Tube     Extra Blue Top Tube     Peripheral IV catheter     Adult Type and " Screen     CBC with platelets differential     ABO/Rh type and screen       RESULTS: As noted above.            Medical/Surgical History:  Past Medical History:   Diagnosis Date     Alcoholism (H) 4/1/2019     CAD (coronary artery disease) 4/1/2019     COPD (chronic obstructive pulmonary disease) (H) 4/1/2019     No past surgical history on file.    Medications:  No current facility-administered medications for this encounter.     Current Outpatient Medications   Medication     ferrous fumarate 65 mg, [place]. FE,-Vitamin C 125 mg (VITRON C)  MG TABS tablet     albuterol (PROAIR HFA/PROVENTIL HFA/VENTOLIN HFA) 108 (90 BASE) MCG/ACT Inhaler     aspirin (ASA) 81 MG chewable tablet     atorvastatin (LIPITOR) 80 MG tablet     budesonide-formoterol (SYMBICORT) 160-4.5 MCG/ACT Inhaler     doxycycline hyclate (VIBRA-TABS) 100 MG tablet     fluticasone (FLONASE) 50 MCG/ACT nasal spray     folic acid (FOLVITE) 1 MG tablet     furosemide (LASIX) 20 MG tablet     ipratropium - albuterol 0.5 mg/2.5 mg/3 mL (DUONEB) 0.5-2.5 (3) MG/3ML neb solution     lisinopril (PRINIVIL/ZESTRIL) 5 MG tablet     metoprolol succinate ER (TOPROL-XL) 100 MG 24 hr tablet     multivitamin, therapeutic (THERA-VIT) TABS tablet     nitroGLYcerin (NITROSTAT) 0.4 MG sublingual tablet     omeprazole (PRILOSEC) 20 MG DR capsule     order for DME     saccharomyces boulardii (FLORASTOR) 250 MG capsule     silver sulfADIAZINE (SILVADENE) 1 % external cream     vitamin B1 (THIAMINE) 100 MG tablet       Allergies:  Amoxicillin-pot clavulanate, Bupropion, Nicotine, Oxycodone, Pseudoephedrine, and Sertraline    Relevant labs, images, EKGs, Epic and outside hospital (if applicable) charts were reviewed. The findings, diagnosis, plan, and need for follow up were discussed with the patient/family. Nursing notes were reviewed.      Chance Espino MD  06/22/23 3607     N/A

## 2023-06-23 NOTE — DISCHARGE INSTRUCTIONS
Recommend suture removal from left thigh about 10-14 days after they were placed.    Okay to use topical bacitracin or Neosporin to the left thigh laceration.  Apply once or twice daily as needed.  Replace bandages as needed.    Start oral iron supplement.    Hold all of your morning medications tomorrow until the home care nurse follows up with you.    -- We likely will need to have you hold some of your blood pressure medications.    Hold warfarin/skip warfarin tonight.  Thursday, 6/22/2023.    Follow-up as needed for new or worsening symptoms.

## 2023-06-23 NOTE — PROGRESS NOTES
Pt states she is feeling better denies increased dizziness much better when standing up to cammode

## 2023-06-23 NOTE — ED TRIAGE NOTES
"Patient being evaluated today for weakness and dizziness with position changes. She reports a recent admission at [place] after a fall with large laceration and extensive repair to left thigh. She also reported have a \"bleeding ulcer cauterized\" during that admission as well. Pt. Is concerned for fluid overload as she reports feeling \"bloated\" which she states happens when she gets overloaded. EMS noted BPs 70s/40s at home. Patient resting on ED cart. Call light in reach. BP 90/61   Pulse 75   Temp 98.2  F (36.8  C) (Tympanic)   Resp 15   Ht 1.6 m (5' 3\")   Wt 81.6 kg (180 lb)   SpO2 90%   BMI 31.89 kg/m         Triage Assessment     Row Name 06/22/23 7521       Triage Assessment (Adult)    Airway WDL WDL       Respiratory WDL    Respiratory WDL X;rhythm/pattern    Rhythm/Pattern, Respiratory shortness of breath       Skin Circulation/Temperature WDL    Skin Circulation/Temperature WDL circulation    Skin Circulation pallor       Cardiac WDL    Cardiac WDL WDL       Peripheral/Neurovascular WDL    Peripheral Neurovascular WDL WDL       Cognitive/Neuro/Behavioral WDL    Cognitive/Neuro/Behavioral WDL WDL              "

## 2023-10-26 ENCOUNTER — APPOINTMENT (OUTPATIENT)
Dept: CT IMAGING | Facility: OTHER | Age: 73
End: 2023-10-26
Attending: STUDENT IN AN ORGANIZED HEALTH CARE EDUCATION/TRAINING PROGRAM
Payer: MEDICARE

## 2023-10-26 ENCOUNTER — HOSPITAL ENCOUNTER (EMERGENCY)
Facility: OTHER | Age: 73
Discharge: HOME | End: 2023-10-26
Attending: STUDENT IN AN ORGANIZED HEALTH CARE EDUCATION/TRAINING PROGRAM | Admitting: STUDENT IN AN ORGANIZED HEALTH CARE EDUCATION/TRAINING PROGRAM
Payer: MEDICARE

## 2023-10-26 VITALS
OXYGEN SATURATION: 96 % | RESPIRATION RATE: 20 BRPM | TEMPERATURE: 98.1 F | SYSTOLIC BLOOD PRESSURE: 129 MMHG | DIASTOLIC BLOOD PRESSURE: 65 MMHG | HEART RATE: 96 BPM

## 2023-10-26 DIAGNOSIS — M54.9 UPPER BACK PAIN: ICD-10-CM

## 2023-10-26 DIAGNOSIS — M54.2 ACUTE NECK PAIN: ICD-10-CM

## 2023-10-26 LAB
BASOPHILS # BLD AUTO: 0 10E3/UL (ref 0–0.2)
BASOPHILS NFR BLD AUTO: 1 %
CRP SERPL-MCNC: 154.31 MG/L
EOSINOPHIL # BLD AUTO: 0 10E3/UL (ref 0–0.7)
EOSINOPHIL NFR BLD AUTO: 0 %
ERYTHROCYTE [DISTWIDTH] IN BLOOD BY AUTOMATED COUNT: 15.1 % (ref 10–15)
ERYTHROCYTE [SEDIMENTATION RATE] IN BLOOD BY WESTERGREN METHOD: 92 MM/HR (ref 0–30)
HCT VFR BLD AUTO: 32.3 % (ref 35–47)
HGB BLD-MCNC: 10.3 G/DL (ref 11.7–15.7)
HOLD SPECIMEN: NORMAL
IMM GRANULOCYTES # BLD: 0 10E3/UL
IMM GRANULOCYTES NFR BLD: 1 %
LYMPHOCYTES # BLD AUTO: 1 10E3/UL (ref 0.8–5.3)
LYMPHOCYTES NFR BLD AUTO: 15 %
MCH RBC QN AUTO: 31.5 PG (ref 26.5–33)
MCHC RBC AUTO-ENTMCNC: 31.9 G/DL (ref 31.5–36.5)
MCV RBC AUTO: 99 FL (ref 78–100)
MONOCYTES # BLD AUTO: 0.8 10E3/UL (ref 0–1.3)
MONOCYTES NFR BLD AUTO: 12 %
NEUTROPHILS # BLD AUTO: 4.7 10E3/UL (ref 1.6–8.3)
NEUTROPHILS NFR BLD AUTO: 71 %
NRBC # BLD AUTO: 0 10E3/UL
NRBC BLD AUTO-RTO: 0 /100
PLATELET # BLD AUTO: 134 10E3/UL (ref 150–450)
RBC # BLD AUTO: 3.27 10E6/UL (ref 3.8–5.2)
WBC # BLD AUTO: 6.6 10E3/UL (ref 4–11)

## 2023-10-26 PROCEDURE — 36415 COLL VENOUS BLD VENIPUNCTURE: CPT | Performed by: STUDENT IN AN ORGANIZED HEALTH CARE EDUCATION/TRAINING PROGRAM

## 2023-10-26 PROCEDURE — 99285 EMERGENCY DEPT VISIT HI MDM: CPT | Mod: 25 | Performed by: STUDENT IN AN ORGANIZED HEALTH CARE EDUCATION/TRAINING PROGRAM

## 2023-10-26 PROCEDURE — 250N000011 HC RX IP 250 OP 636: Mod: JZ | Performed by: STUDENT IN AN ORGANIZED HEALTH CARE EDUCATION/TRAINING PROGRAM

## 2023-10-26 PROCEDURE — 96374 THER/PROPH/DIAG INJ IV PUSH: CPT | Performed by: STUDENT IN AN ORGANIZED HEALTH CARE EDUCATION/TRAINING PROGRAM

## 2023-10-26 PROCEDURE — 85652 RBC SED RATE AUTOMATED: CPT | Performed by: STUDENT IN AN ORGANIZED HEALTH CARE EDUCATION/TRAINING PROGRAM

## 2023-10-26 PROCEDURE — 96375 TX/PRO/DX INJ NEW DRUG ADDON: CPT | Performed by: STUDENT IN AN ORGANIZED HEALTH CARE EDUCATION/TRAINING PROGRAM

## 2023-10-26 PROCEDURE — 72125 CT NECK SPINE W/O DYE: CPT | Mod: MF

## 2023-10-26 PROCEDURE — 96376 TX/PRO/DX INJ SAME DRUG ADON: CPT | Performed by: STUDENT IN AN ORGANIZED HEALTH CARE EDUCATION/TRAINING PROGRAM

## 2023-10-26 PROCEDURE — 99284 EMERGENCY DEPT VISIT MOD MDM: CPT | Performed by: STUDENT IN AN ORGANIZED HEALTH CARE EDUCATION/TRAINING PROGRAM

## 2023-10-26 PROCEDURE — 85004 AUTOMATED DIFF WBC COUNT: CPT | Performed by: STUDENT IN AN ORGANIZED HEALTH CARE EDUCATION/TRAINING PROGRAM

## 2023-10-26 PROCEDURE — 86140 C-REACTIVE PROTEIN: CPT | Performed by: STUDENT IN AN ORGANIZED HEALTH CARE EDUCATION/TRAINING PROGRAM

## 2023-10-26 RX ORDER — HYDROMORPHONE HYDROCHLORIDE 1 MG/ML
0.5 INJECTION, SOLUTION INTRAMUSCULAR; INTRAVENOUS; SUBCUTANEOUS ONCE
Status: COMPLETED | OUTPATIENT
Start: 2023-10-26 | End: 2023-10-26

## 2023-10-26 RX ORDER — DEXAMETHASONE SODIUM PHOSPHATE 10 MG/ML
10 INJECTION, SOLUTION INTRAMUSCULAR; INTRAVENOUS ONCE
Status: COMPLETED | OUTPATIENT
Start: 2023-10-26 | End: 2023-10-26

## 2023-10-26 RX ORDER — OXYCODONE HYDROCHLORIDE 5 MG/1
5 TABLET ORAL EVERY 6 HOURS PRN
Qty: 12 TABLET | Refills: 0 | Status: SHIPPED | OUTPATIENT
Start: 2023-10-26 | End: 2023-10-29

## 2023-10-26 RX ORDER — PREDNISONE 20 MG/1
20 TABLET ORAL DAILY
Qty: 7 TABLET | Refills: 0 | Status: SHIPPED | OUTPATIENT
Start: 2023-10-26 | End: 2023-11-02

## 2023-10-26 RX ORDER — PREDNISONE 20 MG/1
20 TABLET ORAL ONCE
Status: DISCONTINUED | OUTPATIENT
Start: 2023-10-26 | End: 2023-10-26

## 2023-10-26 RX ADMIN — HYDROMORPHONE HYDROCHLORIDE 0.5 MG: 1 INJECTION, SOLUTION INTRAMUSCULAR; INTRAVENOUS; SUBCUTANEOUS at 12:18

## 2023-10-26 RX ADMIN — HYDROMORPHONE HYDROCHLORIDE 0.5 MG: 1 INJECTION, SOLUTION INTRAMUSCULAR; INTRAVENOUS; SUBCUTANEOUS at 13:12

## 2023-10-26 RX ADMIN — DEXAMETHASONE SODIUM PHOSPHATE 10 MG: 10 INJECTION, SOLUTION INTRAMUSCULAR; INTRAVENOUS at 15:05

## 2023-10-26 RX ADMIN — HYDROMORPHONE HYDROCHLORIDE 0.5 MG: 1 INJECTION, SOLUTION INTRAMUSCULAR; INTRAVENOUS; SUBCUTANEOUS at 15:04

## 2023-10-26 ASSESSMENT — ACTIVITIES OF DAILY LIVING (ADL)
ADLS_ACUITY_SCORE: 38
ADLS_ACUITY_SCORE: 38

## 2023-10-26 NOTE — ED PROVIDER NOTES
History     Chief Complaint   Patient presents with    Shoulder Pain    Neck Pain       Natalee Montalvo is a 73 year old female who presents with bilateral scapular and neck pain.  Onset 3 days ago becoming severe.  No home medications tried.  Pain is located over lower thoracic spine with radiation to both scapula.  It is worsened by any neck movement.  Pain is different than her chronic right shoulder rotator cuff pain for which she receives steroid injections, last one performed 1 month ago.  Denies any numbness or weakness in her arms or thoracic back, fever, chills, preceding recent trauma.  Remote surgical history to her neck in the past.      Allergies   Allergen Reactions    Amoxicillin-Pot Clavulanate      Other reaction(s): GI intolerance  Stomach cramps    Bupropion      Other reaction(s): Other  Insomnia and tension    Nicotine Rash    Oxycodone Nausea    Pseudoephedrine      Other reaction(s): Increased blood pressure, Tachycardia  Advise not to use this medication which was in her allergy product.    Sertraline Rash       Patient Active Problem List    Diagnosis Date Noted    Thigh laceration, left, subsequent encounter 06/22/2023     Priority: Medium    Low blood pressure reading 06/22/2023     Priority: Medium    Anemia due to blood loss, acute 06/17/2023     Priority: Medium    Elevated troponin 06/17/2023     Priority: Medium    Acute pain of right shoulder 06/17/2023     Priority: Medium    Thigh laceration 06/17/2023     Priority: Medium    Fall 06/17/2023     Priority: Medium    Acute on chronic combined systolic and diastolic congestive heart failure (H) 01/19/2022     Priority: Medium    Abnormal nuclear stress test 06/10/2021     Priority: Medium     Formatting of this note might be different from the original.  Added automatically from request for surgery [id number]      Alcohol withdrawal (H) 04/05/2019     Priority: Medium    Hypovolemic shock (H) 04/01/2019     Priority: Medium    CAD  (coronary artery disease) 04/01/2019     Priority: Medium    COPD (chronic obstructive pulmonary disease) (H) 04/01/2019     Priority: Medium    Alcoholism (H) 04/01/2019     Priority: Medium    Hypokalemia 04/01/2019     Priority: Medium    Hyponatremia 04/01/2019     Priority: Medium    Closed fracture of multiple ribs of right side 04/01/2019     Priority: Medium    Prolonged Q-T interval on ECG 04/01/2019     Priority: Medium    Alcoholic fatty liver 01/19/2019     Priority: Medium    History of non-ST elevation myocardial infarction (NSTEMI) 01/19/2019     Priority: Medium       Past Medical History:   Diagnosis Date    Alcoholism (H) 4/1/2019    CAD (coronary artery disease) 4/1/2019    COPD (chronic obstructive pulmonary disease) (H) 4/1/2019       History reviewed. No pertinent surgical history.    History reviewed. No pertinent family history.    Social History     Tobacco Use    Smoking status: Every Day     Packs/day: .5     Types: Cigarettes    Smokeless tobacco: Never   Substance Use Topics    Alcohol use: Yes     Comment: daily    Drug use: No     Comment: Drug use: No       Medications:    oxyCODONE (ROXICODONE) 5 MG tablet  predniSONE (DELTASONE) 20 MG tablet  albuterol (PROAIR HFA/PROVENTIL HFA/VENTOLIN HFA) 108 (90 BASE) MCG/ACT Inhaler  aspirin (ASA) 81 MG chewable tablet  atorvastatin (LIPITOR) 80 MG tablet  budesonide-formoterol (SYMBICORT) 160-4.5 MCG/ACT Inhaler  doxycycline hyclate (VIBRA-TABS) 100 MG tablet  ferrous fumarate 65 mg, [place]. FE,-Vitamin C 125 mg (VITRON C)  MG TABS tablet  fluticasone (FLONASE) 50 MCG/ACT nasal spray  folic acid (FOLVITE) 1 MG tablet  furosemide (LASIX) 20 MG tablet  ipratropium - albuterol 0.5 mg/2.5 mg/3 mL (DUONEB) 0.5-2.5 (3) MG/3ML neb solution  lisinopril (PRINIVIL/ZESTRIL) 5 MG tablet  metoprolol succinate ER (TOPROL-XL) 100 MG 24 hr tablet  multivitamin, therapeutic (THERA-VIT) TABS tablet  nitroGLYcerin (NITROSTAT) 0.4 MG sublingual  tablet  omeprazole (PRILOSEC) 20 MG DR capsule  order for DME  saccharomyces boulardii (FLORASTOR) 250 MG capsule  silver sulfADIAZINE (SILVADENE) 1 % external cream  vitamin B1 (THIAMINE) 100 MG tablet        Review of Systems: See HPI for pertinent negatives and positives. All other systems reviewed and found to be negative.    Physical Exam   /69   Pulse 96   Temp 98.1  F (36.7  C) (Tympanic)   Resp 20   SpO2 96%      General: awake, in severe pain with any movment  HEENT: atraumatic  Respiratory: normal effort, clear to auscultation bilaterally  Cardiovascular: regular rate and rhythm, no murmurs  Abdomen: soft, nondistended, nontender  Extremities: no deformities, edema, or tenderness  Spine: Lower cervical tenderness which reproduces her pain.  Skin: warm, dry, no rashes or ecchymosis  Neuro: alert, no focal deficits, normal symmetric upper extremity and sensation, bilateral thoracic back sensation intact  Psych: appropriate mood and affect    ED Course      ED Course as of 10/26/23 1624   u Oct 26, 2023   1207 9/11/2023 right shoulder steroid injection for right rotator cuff complete tear.       Results for orders placed or performed during the hospital encounter of 10/26/23 (from the past 24 hour(s))   Extra Tube    Narrative    The following orders were created for panel order Extra Tube.  Procedure                               Abnormality         Status                     ---------                               -----------         ------                     Extra Blue Top Tube[[id number]]                              Final result               Extra Red Top Tube[[id number]]                               Final result               Extra Green Top (Lithium...[[id number]]                      Final result                 Please view results for these tests on the individual orders.   Extra Blue Top Tube   Result Value Ref Range    Hold Specimen JIC    Extra Red Top Tube   Result Value Ref Range     Hold Specimen JIC    Extra Green Top (Lithium Heparin) ON ICE   Result Value Ref Range    Hold Specimen JIC    CBC with platelets differential    Narrative    The following orders were created for panel order CBC with platelets differential.  Procedure                               Abnormality         Status                     ---------                               -----------         ------                     CBC with platelets and d...[[id number]]  Abnormal            Final result                 Please view results for these tests on the individual orders.   CRP inflammation   Result Value Ref Range    CRP Inflammation 154.31 (H) <5.00 mg/L   Erythrocyte sedimentation rate auto   Result Value Ref Range    Erythrocyte Sedimentation Rate 92 (H) 0 - 30 mm/hr   CBC with platelets and differential   Result Value Ref Range    WBC Count 6.6 4.0 - 11.0 10e3/uL    RBC Count 3.27 (L) 3.80 - 5.20 10e6/uL    Hemoglobin 10.3 (L) 11.7 - 15.7 g/dL    Hematocrit 32.3 (L) 35.0 - 47.0 %    MCV 99 78 - 100 fL    MCH 31.5 26.5 - 33.0 pg    MCHC 31.9 31.5 - 36.5 g/dL    RDW 15.1 (H) 10.0 - 15.0 %    Platelet Count 134 (L) 150 - 450 10e3/uL    % Neutrophils 71 %    % Lymphocytes 15 %    % Monocytes 12 %    % Eosinophils 0 %    % Basophils 1 %    % Immature Granulocytes 1 %    NRBCs per 100 WBC 0 <1 /100    Absolute Neutrophils 4.7 1.6 - 8.3 10e3/uL    Absolute Lymphocytes 1.0 0.8 - 5.3 10e3/uL    Absolute Monocytes 0.8 0.0 - 1.3 10e3/uL    Absolute Eosinophils 0.0 0.0 - 0.7 10e3/uL    Absolute Basophils 0.0 0.0 - 0.2 10e3/uL    Absolute Immature Granulocytes 0.0 <=0.4 10e3/uL    Absolute NRBCs 0.0 10e3/uL   CT Cervical Spine w/o Contrast    Narrative    EXAM: CT CERVICAL SPINE W/O CONTRAST 10/26/2023 2:06 PM    PROVIDED HISTORY: Severe lower cervical pain x3 days with radiation to  both scapulas.  No preceding factors including trauma; Neck pain; Neck  pain, no complicating feature; No chronic neck pain >= 3 months    COMPARISON: CT  cervical spine 4/1/2019    TECHNIQUE:   Imaging protocol: Using multidetector thin collimation helical  acquisition technique, axial, coronal and sagittal CT images through  the cervical spine were obtained without intravenous contrast.   Acquisition: This CT exam was performed using one or more the  following dose reduction techniques: automated exposure control,  adjustment of the mA and/or kV according to patient size, and/or  iterative reconstruction technique.    FINDINGS:  Trace anterolisthesis of C7 on T1. Straightening of the normal  cervical lordotic curvature. No findings of acute fracture or  traumatic subluxation. No prevertebral edema. There is partial  ankylosis C6-7, mild to moderate disc space loss C3-C6 and C7-T1.  Multilevel and degenerative endplate changes with uncinate and facet  hypertrophy that is most prominent at C4-C7. Degenerative changes are  associated with varying degrees of neural foraminal stenosis. Mild  spinal canal narrowing C4-C6.  No high-grade spinal canal stenosis at  any level.     No acute abnormality of the paraspinous soft tissues. Left neck  vascular bypass graft.      Impression    IMPRESSION:   1.  No acute fracture or traumatic subluxation.  2.  Multilevel cervical spondylosis without high-grade spinal canal  narrowing.    MONICA LAUREN MD         SYSTEM ID:  [id number]       Medications   HYDROmorphone (PF) (DILAUDID) injection 0.5 mg (0.5 mg Intravenous $Given 10/26/23 1218)   HYDROmorphone (PF) (DILAUDID) injection 0.5 mg (0.5 mg Intravenous $Given 10/26/23 1312)   dexAMETHasone PF (DECADRON) injection 10 mg (10 mg Intravenous $Given 10/26/23 1505)   HYDROmorphone (PF) (DILAUDID) injection 0.5 mg (0.5 mg Intravenous $Given 10/26/23 1504)       Assessments & Plan (with Medical Decision Making)     I have reviewed the nursing notes.    73 year old female evaluated for several days of severe neck and bilateral scapular pain.  Differential includes cervical stenosis,  polymyalgia rheumatica, cervical fracture.  CT scan with no severe spinal stenosis or fracture.  Inflammatory markers quite elevated.  Symptoms may be due to polymyalgia rheumatica.  Plan to treat with steroids and pain medication with close PCP follow-up scheduled for her.  Severe pain well controlled with patient now appearing comfortable.  Sending home with short course of pain medication as well.  Discharged home with below plan.      I have reviewed the findings, diagnosis, plan, and need for any follow up with the patient.    Patient instructions:   Your presentation with Cape-like pain location over your back of your shoulders and neck may be a condition called polymyalgia rheumatica.  The treatment for this is steroids.  Please start taking prednisone 20mg daily.      We set up appointment with a PCP here for further evaluation.  After that you may work to select a different PCP if you do not feel this PCP is a good fit.    For moderate to severe pain, alternate nsaids (e.g. Ibuprofen) every 6 hours and Tylenol every 6 hours. For example, first take Ibuprofen, then 3 hours later take Tylenol, then 3 hours later take Ibuprofen, then 3 hours later take Tylenol, and so forth. You can take up to 3200 mg of ibuprofen and 4000 mg of tylenol over a 24 hour period. Always take nsaids with food to avoid stomach ulcer.    A short course of oxycodone narcotic pain medication has been provided for severe break-through pain not controlled by nsaids/tylenol. Use carefully as narcotic pain medication can be addictive and dangerous (including life-threatening) if not used as prescribed. Side effects can include sedation, nausea, or constipation. For constipation use stool softener. Don't mix with alcohol, sedating drugs, or drive while taking.    Return to the ER for significantly worsening condition or other new acute emergent health concern.      New Prescriptions    OXYCODONE (ROXICODONE) 5 MG TABLET    Take 1 tablet (5  mg) by mouth every 6 hours as needed for pain    PREDNISONE (DELTASONE) 20 MG TABLET    Take 1 tablet (20 mg) by mouth daily for 7 days       Final diagnoses:   Upper back pain   Acute neck pain       10/26/2023   [facility]     Wesly Emanuel MD  10/26/23 7132

## 2023-10-26 NOTE — DISCHARGE INSTRUCTIONS
Your presentation with Cape-like pain location over your back of your shoulders and neck may be a condition called polymyalgia rheumatica.  The treatment for this is steroids.  Please start taking prednisone 20mg daily.      We set up appointment with a PCP here for further evaluation.  After that you may work to select a different PCP if you do not feel this PCP is a good fit.    For moderate to severe pain, alternate nsaids (e.g. Ibuprofen) every 6 hours and Tylenol every 6 hours. For example, first take Ibuprofen, then 3 hours later take Tylenol, then 3 hours later take Ibuprofen, then 3 hours later take Tylenol, and so forth. You can take up to 3200 mg of ibuprofen and 4000 mg of tylenol over a 24 hour period. Always take nsaids with food to avoid stomach ulcer.    A short course of oxycodone narcotic pain medication has been provided for severe break-through pain not controlled by nsaids/tylenol. Use carefully as narcotic pain medication can be addictive and dangerous (including life-threatening) if not used as prescribed. Side effects can include sedation, nausea, or constipation. For constipation use stool softener. Don't mix with alcohol, sedating drugs, or drive while taking.    Return to the ER for significantly worsening condition or other new acute emergent health concern.

## 2023-10-26 NOTE — ED TRIAGE NOTES
Pt arrives to ER via [facility] with c/o bilateral shoulder and neck pain that started 3 days ago. Pt reports using ice and heat with no relief. Pt reports not taking anything PO for pain. Pt denies any recent falls.

## 2023-12-30 ENCOUNTER — HOSPITAL ENCOUNTER (OUTPATIENT)
Dept: MRI IMAGING | Facility: OTHER | Age: 73
Discharge: HOME | End: 2023-12-30
Attending: NURSE PRACTITIONER | Admitting: NURSE PRACTITIONER
Payer: MEDICARE

## 2023-12-30 DIAGNOSIS — M54.2 CERVICAL PAIN: ICD-10-CM

## 2023-12-30 PROCEDURE — 72141 MRI NECK SPINE W/O DYE: CPT

## 2024-01-17 ENCOUNTER — ANESTHESIA (OUTPATIENT)
Dept: EMERGENCY MEDICINE | Facility: OTHER | Age: 74
DRG: 377 | End: 2024-01-17
Payer: MEDICARE

## 2024-01-17 ENCOUNTER — APPOINTMENT (OUTPATIENT)
Dept: CT IMAGING | Facility: OTHER | Age: 74
DRG: 377 | End: 2024-01-17
Attending: PHYSICIAN ASSISTANT
Payer: MEDICARE

## 2024-01-17 ENCOUNTER — APPOINTMENT (OUTPATIENT)
Dept: GENERAL RADIOLOGY | Facility: OTHER | Age: 74
DRG: 377 | End: 2024-01-17
Attending: PHYSICIAN ASSISTANT
Payer: MEDICARE

## 2024-01-17 ENCOUNTER — ANESTHESIA EVENT (OUTPATIENT)
Dept: EMERGENCY MEDICINE | Facility: OTHER | Age: 74
DRG: 377 | End: 2024-01-17
Payer: MEDICARE

## 2024-01-17 ENCOUNTER — HOSPITAL ENCOUNTER (INPATIENT)
Facility: OTHER | Age: 74
LOS: 2 days | Discharge: HOME | DRG: 377 | End: 2024-01-19
Attending: PHYSICIAN ASSISTANT | Admitting: HOSPITALIST
Payer: MEDICARE

## 2024-01-17 DIAGNOSIS — K92.2 GASTROINTESTINAL HEMORRHAGE: ICD-10-CM

## 2024-01-17 DIAGNOSIS — R79.1 SUPRATHERAPEUTIC INR: ICD-10-CM

## 2024-01-17 DIAGNOSIS — D62 ANEMIA DUE TO BLOOD LOSS, ACUTE: ICD-10-CM

## 2024-01-17 DIAGNOSIS — R03.1 LOW BLOOD PRESSURE READING: ICD-10-CM

## 2024-01-17 DIAGNOSIS — K92.2 GASTROINTESTINAL HEMORRHAGE, UNSPECIFIED GASTROINTESTINAL HEMORRHAGE TYPE: ICD-10-CM

## 2024-01-17 DIAGNOSIS — J44.9 CHRONIC OBSTRUCTIVE PULMONARY DISEASE, UNSPECIFIED COPD TYPE (H): Primary | ICD-10-CM

## 2024-01-17 DIAGNOSIS — F10.20 ALCOHOLISM (H): ICD-10-CM

## 2024-01-17 DIAGNOSIS — G47.00 INSOMNIA, UNSPECIFIED TYPE: ICD-10-CM

## 2024-01-17 DIAGNOSIS — K92.2 GI BLEED: ICD-10-CM

## 2024-01-17 DIAGNOSIS — J44.1 CHRONIC OBSTRUCTIVE PULMONARY DISEASE WITH ACUTE EXACERBATION (H): ICD-10-CM

## 2024-01-17 LAB
ABO/RH(D): NORMAL
ALBUMIN SERPL BCG-MCNC: 4 G/DL (ref 3.5–5.2)
ALP SERPL-CCNC: 90 U/L (ref 40–150)
ALT SERPL W P-5'-P-CCNC: 34 U/L (ref 0–50)
ANION GAP SERPL CALCULATED.3IONS-SCNC: 12 MMOL/L (ref 7–15)
ANTIBODY SCREEN: NEGATIVE
APTT PPP: 50 SECONDS (ref 22–38)
AST SERPL W P-5'-P-CCNC: 53 U/L (ref 0–45)
BASOPHILS # BLD AUTO: 0 10E3/UL (ref 0–0.2)
BASOPHILS NFR BLD AUTO: 1 %
BILIRUB SERPL-MCNC: 0.3 MG/DL
BLD PROD TYP BPU: NORMAL
BLOOD COMPONENT TYPE: NORMAL
BUN SERPL-MCNC: 54.6 MG/DL (ref 8–23)
CALCIUM SERPL-MCNC: 9.3 MG/DL (ref 8.8–10.2)
CHLORIDE SERPL-SCNC: 91 MMOL/L (ref 98–107)
CODING SYSTEM: NORMAL
CREAT SERPL-MCNC: 0.88 MG/DL (ref 0.51–0.95)
CROSSMATCH: NORMAL
DEPRECATED HCO3 PLAS-SCNC: 30 MMOL/L (ref 22–29)
EGFRCR SERPLBLD CKD-EPI 2021: 68 ML/MIN/1.73M2
EOSINOPHIL # BLD AUTO: 0 10E3/UL (ref 0–0.7)
EOSINOPHIL NFR BLD AUTO: 0 %
ERYTHROCYTE [DISTWIDTH] IN BLOOD BY AUTOMATED COUNT: 21 % (ref 10–15)
ETHANOL SERPL-MCNC: 0.03 G/DL
GLUCOSE SERPL-MCNC: 109 MG/DL (ref 70–99)
HCT VFR BLD AUTO: 20.5 % (ref 35–47)
HEMOCCULT STL QL: POSITIVE
HGB BLD-MCNC: 6.5 G/DL (ref 11.7–15.7)
HOLD SPECIMEN: NORMAL
IMM GRANULOCYTES # BLD: 0 10E3/UL
IMM GRANULOCYTES NFR BLD: 1 %
INR PPP: 1.19 (ref 0.85–1.15)
INR PPP: 3.23 (ref 0.85–1.15)
ISSUE DATE AND TIME: NORMAL
LYMPHOCYTES # BLD AUTO: 0.9 10E3/UL (ref 0.8–5.3)
LYMPHOCYTES NFR BLD AUTO: 14 %
MAGNESIUM SERPL-MCNC: 1.7 MG/DL (ref 1.7–2.3)
MCH RBC QN AUTO: 30.1 PG (ref 26.5–33)
MCHC RBC AUTO-ENTMCNC: 31.7 G/DL (ref 31.5–36.5)
MCV RBC AUTO: 95 FL (ref 78–100)
MONOCYTES # BLD AUTO: 0.3 10E3/UL (ref 0–1.3)
MONOCYTES NFR BLD AUTO: 5 %
NEUTROPHILS # BLD AUTO: 4.6 10E3/UL (ref 1.6–8.3)
NEUTROPHILS NFR BLD AUTO: 79 %
NRBC # BLD AUTO: 0 10E3/UL
NRBC BLD AUTO-RTO: 0 /100
NT-PROBNP SERPL-MCNC: 355 PG/ML (ref 0–900)
PLATELET # BLD AUTO: 147 10E3/UL (ref 150–450)
POTASSIUM SERPL-SCNC: 4.1 MMOL/L (ref 3.4–5.3)
PROT SERPL-MCNC: 6.5 G/DL (ref 6.4–8.3)
RBC # BLD AUTO: 2.16 10E6/UL (ref 3.8–5.2)
SODIUM SERPL-SCNC: 133 MMOL/L (ref 135–145)
SPECIMEN EXPIRATION DATE: NORMAL
TROPONIN T SERPL HS-MCNC: 18 NG/L
UNIT ABO/RH: NORMAL
UNIT NUMBER: NORMAL
UNIT STATUS: NORMAL
UNIT TYPE ISBT: 6200
WBC # BLD AUTO: 5.9 10E3/UL (ref 4–11)

## 2024-01-17 PROCEDURE — 30233N1 TRANSFUSION OF NONAUTOLOGOUS RED BLOOD CELLS INTO PERIPHERAL VEIN, PERCUTANEOUS APPROACH: ICD-10-PCS | Performed by: PHYSICIAN ASSISTANT

## 2024-01-17 PROCEDURE — 83735 ASSAY OF MAGNESIUM: CPT | Performed by: PHYSICIAN ASSISTANT

## 2024-01-17 PROCEDURE — 83880 ASSAY OF NATRIURETIC PEPTIDE: CPT | Performed by: PHYSICIAN ASSISTANT

## 2024-01-17 PROCEDURE — 84484 ASSAY OF TROPONIN QUANT: CPT | Performed by: PHYSICIAN ASSISTANT

## 2024-01-17 PROCEDURE — 250N000011 HC RX IP 250 OP 636: Performed by: PHYSICIAN ASSISTANT

## 2024-01-17 PROCEDURE — 85610 PROTHROMBIN TIME: CPT | Performed by: PHYSICIAN ASSISTANT

## 2024-01-17 PROCEDURE — 96376 TX/PRO/DX INJ SAME DRUG ADON: CPT | Mod: XU | Performed by: PHYSICIAN ASSISTANT

## 2024-01-17 PROCEDURE — 84100 ASSAY OF PHOSPHORUS: CPT | Performed by: HOSPITALIST

## 2024-01-17 PROCEDURE — 71260 CT THORAX DX C+: CPT | Mod: TC,MG

## 2024-01-17 PROCEDURE — 86923 COMPATIBILITY TEST ELECTRIC: CPT | Performed by: PHYSICIAN ASSISTANT

## 2024-01-17 PROCEDURE — P9016 RBC LEUKOCYTES REDUCED: HCPCS | Performed by: PHYSICIAN ASSISTANT

## 2024-01-17 PROCEDURE — 200N000001 HC R&B ICU

## 2024-01-17 PROCEDURE — 86900 BLOOD TYPING SEROLOGIC ABO: CPT | Performed by: PHYSICIAN ASSISTANT

## 2024-01-17 PROCEDURE — 99291 CRITICAL CARE FIRST HOUR: CPT | Mod: 25 | Performed by: PHYSICIAN ASSISTANT

## 2024-01-17 PROCEDURE — 96375 TX/PRO/DX INJ NEW DRUG ADDON: CPT | Mod: XU | Performed by: PHYSICIAN ASSISTANT

## 2024-01-17 PROCEDURE — 93005 ELECTROCARDIOGRAM TRACING: CPT | Performed by: PHYSICIAN ASSISTANT

## 2024-01-17 PROCEDURE — 36430 TRANSFUSION BLD/BLD COMPNT: CPT | Performed by: PHYSICIAN ASSISTANT

## 2024-01-17 PROCEDURE — 99292 CRITICAL CARE ADDL 30 MIN: CPT | Performed by: PHYSICIAN ASSISTANT

## 2024-01-17 PROCEDURE — 86923 COMPATIBILITY TEST ELECTRIC: CPT | Performed by: FAMILY MEDICINE

## 2024-01-17 PROCEDURE — 99285 EMERGENCY DEPT VISIT HI MDM: CPT | Performed by: PHYSICIAN ASSISTANT

## 2024-01-17 PROCEDURE — 85025 COMPLETE CBC W/AUTO DIFF WBC: CPT | Performed by: PHYSICIAN ASSISTANT

## 2024-01-17 PROCEDURE — 36415 COLL VENOUS BLD VENIPUNCTURE: CPT | Performed by: PHYSICIAN ASSISTANT

## 2024-01-17 PROCEDURE — G1010 CDSM STANSON: HCPCS | Mod: TC

## 2024-01-17 PROCEDURE — 71045 X-RAY EXAM CHEST 1 VIEW: CPT

## 2024-01-17 PROCEDURE — 82272 OCCULT BLD FECES 1-3 TESTS: CPT | Performed by: PHYSICIAN ASSISTANT

## 2024-01-17 PROCEDURE — 80053 COMPREHEN METABOLIC PANEL: CPT | Performed by: PHYSICIAN ASSISTANT

## 2024-01-17 PROCEDURE — 82077 ASSAY SPEC XCP UR&BREATH IA: CPT | Performed by: PHYSICIAN ASSISTANT

## 2024-01-17 PROCEDURE — 70450 CT HEAD/BRAIN W/O DYE: CPT | Mod: TC,ME

## 2024-01-17 PROCEDURE — 36410 VNPNXR 3YR/> PHY/QHP DX/THER: CPT

## 2024-01-17 PROCEDURE — 93010 ELECTROCARDIOGRAM REPORT: CPT | Performed by: STUDENT IN AN ORGANIZED HEALTH CARE EDUCATION/TRAINING PROGRAM

## 2024-01-17 PROCEDURE — 85730 THROMBOPLASTIN TIME PARTIAL: CPT | Performed by: PHYSICIAN ASSISTANT

## 2024-01-17 PROCEDURE — 96365 THER/PROPH/DIAG IV INF INIT: CPT | Mod: XU | Performed by: PHYSICIAN ASSISTANT

## 2024-01-17 PROCEDURE — C9113 INJ PANTOPRAZOLE SODIUM, VIA: HCPCS | Performed by: PHYSICIAN ASSISTANT

## 2024-01-17 PROCEDURE — 258N000003 HC RX IP 258 OP 636: Performed by: PHYSICIAN ASSISTANT

## 2024-01-17 RX ORDER — FENTANYL CITRATE 50 UG/ML
25 INJECTION, SOLUTION INTRAMUSCULAR; INTRAVENOUS ONCE
Status: COMPLETED | OUTPATIENT
Start: 2024-01-17 | End: 2024-01-17

## 2024-01-17 RX ORDER — IOPAMIDOL 755 MG/ML
105 INJECTION, SOLUTION INTRAVASCULAR ONCE
Status: COMPLETED | OUTPATIENT
Start: 2024-01-17 | End: 2024-01-17

## 2024-01-17 RX ADMIN — IOPAMIDOL 105 ML: 755 INJECTION, SOLUTION INTRAVENOUS at 23:51

## 2024-01-17 RX ADMIN — PANTOPRAZOLE SODIUM 40 MG: 40 INJECTION, POWDER, FOR SOLUTION INTRAVENOUS at 20:02

## 2024-01-17 RX ADMIN — PROTHROMBIN, COAGULATION FACTOR VII HUMAN, COAGULATION FACTOR IX HUMAN, COAGULATION FACTOR X HUMAN, PROTEIN C, PROTEIN S HUMAN, AND WATER 2195 UNITS: KIT at 22:09

## 2024-01-17 RX ADMIN — PHYTONADIONE 10 MG: 10 INJECTION, EMULSION INTRAMUSCULAR; INTRAVENOUS; SUBCUTANEOUS at 21:30

## 2024-01-17 RX ADMIN — PANTOPRAZOLE SODIUM 40 MG: 40 INJECTION, POWDER, FOR SOLUTION INTRAVENOUS at 23:36

## 2024-01-17 RX ADMIN — FENTANYL CITRATE 25 MCG: 50 INJECTION INTRAMUSCULAR; INTRAVENOUS at 22:33

## 2024-01-17 ASSESSMENT — ENCOUNTER SYMPTOMS
FATIGUE: 1
ABDOMINAL PAIN: 0
BLOOD IN STOOL: 1
SHORTNESS OF BREATH: 1
COUGH: 0
HEMATURIA: 0
CHILLS: 0
FEVER: 0

## 2024-01-17 ASSESSMENT — ACTIVITIES OF DAILY LIVING (ADL)
ADLS_ACUITY_SCORE: 35
ADLS_ACUITY_SCORE: 35

## 2024-01-18 PROBLEM — J44.9 COPD (CHRONIC OBSTRUCTIVE PULMONARY DISEASE) (H): Status: ACTIVE | Noted: 2019-04-01

## 2024-01-18 PROBLEM — K92.2 GI BLEED: Status: ACTIVE | Noted: 2024-01-18

## 2024-01-18 PROBLEM — I50.43 ACUTE ON CHRONIC COMBINED SYSTOLIC AND DIASTOLIC CONGESTIVE HEART FAILURE (H): Status: ACTIVE | Noted: 2022-01-19

## 2024-01-18 LAB
ALBUMIN SERPL BCG-MCNC: 3.6 G/DL (ref 3.5–5.2)
ALP SERPL-CCNC: 75 U/L (ref 40–150)
ALT SERPL W P-5'-P-CCNC: 31 U/L (ref 0–50)
AMYLASE SERPL-CCNC: 42 U/L (ref 28–100)
ANION GAP SERPL CALCULATED.3IONS-SCNC: 10 MMOL/L (ref 7–15)
AST SERPL W P-5'-P-CCNC: ABNORMAL U/L
BILIRUB SERPL-MCNC: 0.7 MG/DL
BLD PROD TYP BPU: NORMAL
BLOOD COMPONENT TYPE: NORMAL
BUN SERPL-MCNC: 46.2 MG/DL (ref 8–23)
CALCIUM SERPL-MCNC: 8.9 MG/DL (ref 8.8–10.2)
CHLORIDE SERPL-SCNC: 96 MMOL/L (ref 98–107)
CODING SYSTEM: NORMAL
CREAT SERPL-MCNC: 0.75 MG/DL (ref 0.51–0.95)
CROSSMATCH: NORMAL
DEPRECATED HCO3 PLAS-SCNC: 30 MMOL/L (ref 22–29)
EGFRCR SERPLBLD CKD-EPI 2021: 83 ML/MIN/1.73M2
ERYTHROCYTE [DISTWIDTH] IN BLOOD BY AUTOMATED COUNT: 19.5 % (ref 10–15)
GLUCOSE SERPL-MCNC: 105 MG/DL (ref 70–99)
HCT VFR BLD AUTO: 24.6 % (ref 35–47)
HGB BLD-MCNC: 7 G/DL (ref 11.7–15.7)
HGB BLD-MCNC: 7.8 G/DL (ref 11.7–15.7)
HGB BLD-MCNC: 8.1 G/DL (ref 11.7–15.7)
HGB BLD-MCNC: 8.2 G/DL (ref 11.7–15.7)
HOLD SPECIMEN: NORMAL
HOLD SPECIMEN: NORMAL
INR PPP: 1.06 (ref 0.85–1.15)
ISSUE DATE AND TIME: NORMAL
LIPASE SERPL-CCNC: 11 U/L (ref 13–60)
MCH RBC QN AUTO: 30.3 PG (ref 26.5–33)
MCHC RBC AUTO-ENTMCNC: 33.3 G/DL (ref 31.5–36.5)
MCV RBC AUTO: 91 FL (ref 78–100)
PHOSPHATE SERPL-MCNC: 3.9 MG/DL (ref 2.5–4.5)
PLATELET # BLD AUTO: 132 10E3/UL (ref 150–450)
POTASSIUM SERPL-SCNC: 4.2 MMOL/L (ref 3.4–5.3)
PROT SERPL-MCNC: 5.9 G/DL (ref 6.4–8.3)
RBC # BLD AUTO: 2.71 10E6/UL (ref 3.8–5.2)
SODIUM SERPL-SCNC: 136 MMOL/L (ref 135–145)
UNIT ABO/RH: NORMAL
UNIT NUMBER: NORMAL
UNIT STATUS: NORMAL
UNIT TYPE ISBT: 6200
WBC # BLD AUTO: 5.3 10E3/UL (ref 4–11)

## 2024-01-18 PROCEDURE — 120N000001 HC R&B MED SURG/OB

## 2024-01-18 PROCEDURE — 250N000013 HC RX MED GY IP 250 OP 250 PS 637: Performed by: INTERNAL MEDICINE

## 2024-01-18 PROCEDURE — C9113 INJ PANTOPRAZOLE SODIUM, VIA: HCPCS | Performed by: INTERNAL MEDICINE

## 2024-01-18 PROCEDURE — 83690 ASSAY OF LIPASE: CPT | Performed by: HOSPITALIST

## 2024-01-18 PROCEDURE — 250N000011 HC RX IP 250 OP 636: Performed by: HOSPITALIST

## 2024-01-18 PROCEDURE — 36415 COLL VENOUS BLD VENIPUNCTURE: CPT | Performed by: HOSPITALIST

## 2024-01-18 PROCEDURE — 82150 ASSAY OF AMYLASE: CPT | Performed by: HOSPITALIST

## 2024-01-18 PROCEDURE — P9016 RBC LEUKOCYTES REDUCED: HCPCS | Performed by: FAMILY MEDICINE

## 2024-01-18 PROCEDURE — 85610 PROTHROMBIN TIME: CPT | Performed by: PHYSICIAN ASSISTANT

## 2024-01-18 PROCEDURE — 82247 BILIRUBIN TOTAL: CPT | Performed by: HOSPITALIST

## 2024-01-18 PROCEDURE — 85018 HEMOGLOBIN: CPT | Performed by: HOSPITALIST

## 2024-01-18 PROCEDURE — 96375 TX/PRO/DX INJ NEW DRUG ADDON: CPT | Performed by: PHYSICIAN ASSISTANT

## 2024-01-18 PROCEDURE — 36415 COLL VENOUS BLD VENIPUNCTURE: CPT | Performed by: PHYSICIAN ASSISTANT

## 2024-01-18 PROCEDURE — 250N000013 HC RX MED GY IP 250 OP 250 PS 637: Performed by: HOSPITALIST

## 2024-01-18 PROCEDURE — 258N000003 HC RX IP 258 OP 636: Performed by: PHYSICIAN ASSISTANT

## 2024-01-18 PROCEDURE — C9113 INJ PANTOPRAZOLE SODIUM, VIA: HCPCS | Performed by: PHYSICIAN ASSISTANT

## 2024-01-18 PROCEDURE — 84155 ASSAY OF PROTEIN SERUM: CPT | Performed by: HOSPITALIST

## 2024-01-18 PROCEDURE — 258N000003 HC RX IP 258 OP 636: Performed by: HOSPITALIST

## 2024-01-18 PROCEDURE — 85027 COMPLETE CBC AUTOMATED: CPT | Performed by: PHYSICIAN ASSISTANT

## 2024-01-18 PROCEDURE — 96376 TX/PRO/DX INJ SAME DRUG ADON: CPT | Performed by: PHYSICIAN ASSISTANT

## 2024-01-18 PROCEDURE — 36415 COLL VENOUS BLD VENIPUNCTURE: CPT | Performed by: INTERNAL MEDICINE

## 2024-01-18 PROCEDURE — 99207 PR NO CHARGE LOS: CPT | Performed by: INTERNAL MEDICINE

## 2024-01-18 PROCEDURE — 250N000011 HC RX IP 250 OP 636: Performed by: PHYSICIAN ASSISTANT

## 2024-01-18 PROCEDURE — 250N000011 HC RX IP 250 OP 636: Performed by: FAMILY MEDICINE

## 2024-01-18 PROCEDURE — 85018 HEMOGLOBIN: CPT | Performed by: INTERNAL MEDICINE

## 2024-01-18 PROCEDURE — 250N000011 HC RX IP 250 OP 636: Performed by: INTERNAL MEDICINE

## 2024-01-18 RX ORDER — LANOLIN ALCOHOL/MO/W.PET/CERES
100 CREAM (GRAM) TOPICAL
COMMUNITY

## 2024-01-18 RX ORDER — IPRATROPIUM BROMIDE AND ALBUTEROL SULFATE 2.5; .5 MG/3ML; MG/3ML
1 SOLUTION RESPIRATORY (INHALATION) EVERY 4 HOURS PRN
Status: DISCONTINUED | OUTPATIENT
Start: 2024-01-18 | End: 2024-01-19 | Stop reason: HOSPADM

## 2024-01-18 RX ORDER — FENTANYL CITRATE 50 UG/ML
25 INJECTION, SOLUTION INTRAMUSCULAR; INTRAVENOUS ONCE
Status: COMPLETED | OUTPATIENT
Start: 2024-01-18 | End: 2024-01-18

## 2024-01-18 RX ORDER — TRAZODONE HYDROCHLORIDE 50 MG/1
50 TABLET, FILM COATED ORAL
Status: DISCONTINUED | OUTPATIENT
Start: 2024-01-18 | End: 2024-01-19 | Stop reason: HOSPADM

## 2024-01-18 RX ORDER — GUAIFENESIN 600 MG/1
600 TABLET, EXTENDED RELEASE ORAL 2 TIMES DAILY PRN
Status: DISCONTINUED | OUTPATIENT
Start: 2024-01-18 | End: 2024-01-19 | Stop reason: HOSPADM

## 2024-01-18 RX ORDER — CLONIDINE HYDROCHLORIDE 0.1 MG/1
0.1 TABLET ORAL EVERY 8 HOURS
Status: DISCONTINUED | OUTPATIENT
Start: 2024-01-18 | End: 2024-01-18

## 2024-01-18 RX ORDER — HALOPERIDOL 5 MG/ML
2.5-5 INJECTION INTRAMUSCULAR EVERY 6 HOURS PRN
Status: DISCONTINUED | OUTPATIENT
Start: 2024-01-18 | End: 2024-01-18

## 2024-01-18 RX ORDER — FLUTICASONE PROPIONATE 50 MCG
2 SPRAY, SUSPENSION (ML) NASAL DAILY
Status: DISCONTINUED | OUTPATIENT
Start: 2024-01-18 | End: 2024-01-18

## 2024-01-18 RX ORDER — OLANZAPINE 5 MG/1
5-10 TABLET, ORALLY DISINTEGRATING ORAL EVERY 6 HOURS PRN
Status: DISCONTINUED | OUTPATIENT
Start: 2024-01-18 | End: 2024-01-19 | Stop reason: HOSPADM

## 2024-01-18 RX ORDER — PANTOPRAZOLE SODIUM 40 MG/1
40 TABLET, DELAYED RELEASE ORAL 2 TIMES DAILY
Status: DISCONTINUED | OUTPATIENT
Start: 2024-01-18 | End: 2024-01-19 | Stop reason: HOSPADM

## 2024-01-18 RX ORDER — NICOTINE 21 MG/24HR
1 PATCH, TRANSDERMAL 24 HOURS TRANSDERMAL DAILY
Status: DISCONTINUED | OUTPATIENT
Start: 2024-01-18 | End: 2024-01-19 | Stop reason: HOSPADM

## 2024-01-18 RX ORDER — HALOPERIDOL 5 MG/ML
2.5-5 INJECTION INTRAMUSCULAR EVERY 6 HOURS PRN
Status: DISCONTINUED | OUTPATIENT
Start: 2024-01-18 | End: 2024-01-19 | Stop reason: HOSPADM

## 2024-01-18 RX ORDER — ALBUTEROL SULFATE 90 UG/1
2 AEROSOL, METERED RESPIRATORY (INHALATION) EVERY 4 HOURS PRN
Status: DISCONTINUED | OUTPATIENT
Start: 2024-01-18 | End: 2024-01-19 | Stop reason: HOSPADM

## 2024-01-18 RX ORDER — LISINOPRIL 10 MG/1
10 TABLET ORAL DAILY
Status: DISCONTINUED | OUTPATIENT
Start: 2024-01-18 | End: 2024-01-19 | Stop reason: HOSPADM

## 2024-01-18 RX ORDER — FUROSEMIDE 20 MG
20 TABLET ORAL DAILY
Status: DISCONTINUED | OUTPATIENT
Start: 2024-01-18 | End: 2024-01-18 | Stop reason: ALTCHOICE

## 2024-01-18 RX ORDER — ALLOPURINOL 100 MG/1
100 TABLET ORAL 2 TIMES DAILY
COMMUNITY
Start: 2023-12-21

## 2024-01-18 RX ORDER — DIAZEPAM 10 MG/2ML
5-10 INJECTION, SOLUTION INTRAMUSCULAR; INTRAVENOUS EVERY 30 MIN PRN
Status: DISCONTINUED | OUTPATIENT
Start: 2024-01-18 | End: 2024-01-18 | Stop reason: ALTCHOICE

## 2024-01-18 RX ORDER — ATORVASTATIN CALCIUM 40 MG/1
80 TABLET, FILM COATED ORAL DAILY
Status: DISCONTINUED | OUTPATIENT
Start: 2024-01-18 | End: 2024-01-19 | Stop reason: HOSPADM

## 2024-01-18 RX ORDER — PREGABALIN 50 MG/1
50 CAPSULE ORAL 3 TIMES DAILY
COMMUNITY

## 2024-01-18 RX ORDER — FOLIC ACID 1 MG/1
1 TABLET ORAL DAILY
Status: DISCONTINUED | OUTPATIENT
Start: 2024-01-18 | End: 2024-01-18

## 2024-01-18 RX ORDER — FLUTICASONE FUROATE AND VILANTEROL 200; 25 UG/1; UG/1
1 POWDER RESPIRATORY (INHALATION) DAILY
Status: DISCONTINUED | OUTPATIENT
Start: 2024-01-18 | End: 2024-01-18

## 2024-01-18 RX ORDER — SERTRALINE HYDROCHLORIDE 100 MG/1
100 TABLET, FILM COATED ORAL DAILY
COMMUNITY

## 2024-01-18 RX ORDER — LIDOCAINE 40 MG/G
CREAM TOPICAL
Status: DISCONTINUED | OUTPATIENT
Start: 2024-01-18 | End: 2024-01-19 | Stop reason: HOSPADM

## 2024-01-18 RX ORDER — FLUMAZENIL 0.1 MG/ML
0.2 INJECTION, SOLUTION INTRAVENOUS
Status: DISCONTINUED | OUTPATIENT
Start: 2024-01-18 | End: 2024-01-19 | Stop reason: HOSPADM

## 2024-01-18 RX ORDER — ONDANSETRON 4 MG/1
4 TABLET, ORALLY DISINTEGRATING ORAL EVERY 6 HOURS PRN
Status: DISCONTINUED | OUTPATIENT
Start: 2024-01-18 | End: 2024-01-19 | Stop reason: HOSPADM

## 2024-01-18 RX ORDER — METOPROLOL SUCCINATE 25 MG/1
25 TABLET, EXTENDED RELEASE ORAL DAILY
Status: DISCONTINUED | OUTPATIENT
Start: 2024-01-18 | End: 2024-01-18

## 2024-01-18 RX ORDER — HYDROXYZINE PAMOATE 25 MG/1
25 CAPSULE ORAL DAILY PRN
COMMUNITY
Start: 2023-08-25

## 2024-01-18 RX ORDER — TORSEMIDE 10 MG/1
20 TABLET ORAL 2 TIMES DAILY
Status: DISCONTINUED | OUTPATIENT
Start: 2024-01-18 | End: 2024-01-19 | Stop reason: HOSPADM

## 2024-01-18 RX ORDER — LORAZEPAM 2 MG/ML
1-2 INJECTION INTRAMUSCULAR EVERY 30 MIN PRN
Status: DISCONTINUED | OUTPATIENT
Start: 2024-01-18 | End: 2024-01-19 | Stop reason: HOSPADM

## 2024-01-18 RX ORDER — DIAZEPAM 5 MG
10 TABLET ORAL EVERY 30 MIN PRN
Status: DISCONTINUED | OUTPATIENT
Start: 2024-01-18 | End: 2024-01-18 | Stop reason: ALTCHOICE

## 2024-01-18 RX ORDER — LANOLIN ALCOHOL/MO/W.PET/CERES
6 CREAM (GRAM) TOPICAL
Status: DISCONTINUED | OUTPATIENT
Start: 2024-01-18 | End: 2024-01-19 | Stop reason: HOSPADM

## 2024-01-18 RX ORDER — FOLIC ACID 1 MG/1
1 TABLET ORAL DAILY
Status: DISCONTINUED | OUTPATIENT
Start: 2024-01-18 | End: 2024-01-19 | Stop reason: HOSPADM

## 2024-01-18 RX ORDER — PANTOPRAZOLE SODIUM 40 MG/1
40 TABLET, DELAYED RELEASE ORAL 2 TIMES DAILY
COMMUNITY

## 2024-01-18 RX ORDER — LORAZEPAM 1 MG/1
1-2 TABLET ORAL EVERY 30 MIN PRN
Status: DISCONTINUED | OUTPATIENT
Start: 2024-01-18 | End: 2024-01-19 | Stop reason: HOSPADM

## 2024-01-18 RX ORDER — ONDANSETRON 4 MG/1
4 TABLET, FILM COATED ORAL EVERY 12 HOURS PRN
COMMUNITY
Start: 2024-01-15

## 2024-01-18 RX ORDER — CALCIUM CARBONATE 500 MG/1
1000 TABLET, CHEWABLE ORAL 4 TIMES DAILY PRN
Status: DISCONTINUED | OUTPATIENT
Start: 2024-01-18 | End: 2024-01-19 | Stop reason: HOSPADM

## 2024-01-18 RX ORDER — LEVOTHYROXINE SODIUM 50 UG/1
50 TABLET ORAL DAILY
COMMUNITY
Start: 2023-12-19

## 2024-01-18 RX ORDER — THIAMINE HYDROCHLORIDE 100 MG/ML
200 INJECTION, SOLUTION INTRAMUSCULAR; INTRAVENOUS DAILY
Status: DISCONTINUED | OUTPATIENT
Start: 2024-01-18 | End: 2024-01-19 | Stop reason: HOSPADM

## 2024-01-18 RX ORDER — ONDANSETRON 2 MG/ML
4 INJECTION INTRAMUSCULAR; INTRAVENOUS EVERY 6 HOURS PRN
Status: DISCONTINUED | OUTPATIENT
Start: 2024-01-18 | End: 2024-01-19 | Stop reason: HOSPADM

## 2024-01-18 RX ORDER — CHLORHEXIDINE GLUCONATE 4 %
1 LIQUID (ML) TOPICAL DAILY
COMMUNITY

## 2024-01-18 RX ORDER — MULTIPLE VITAMINS W/ MINERALS TAB 9MG-400MCG
1 TAB ORAL DAILY
Status: DISCONTINUED | OUTPATIENT
Start: 2024-01-18 | End: 2024-01-19 | Stop reason: HOSPADM

## 2024-01-18 RX ORDER — OLANZAPINE 5 MG/1
5-10 TABLET, ORALLY DISINTEGRATING ORAL EVERY 6 HOURS PRN
Status: DISCONTINUED | OUTPATIENT
Start: 2024-01-18 | End: 2024-01-18

## 2024-01-18 RX ORDER — METOPROLOL SUCCINATE 25 MG/1
25 TABLET, EXTENDED RELEASE ORAL DAILY
Status: DISCONTINUED | OUTPATIENT
Start: 2024-01-19 | End: 2024-01-19 | Stop reason: HOSPADM

## 2024-01-18 RX ORDER — POTASSIUM CHLORIDE 750 MG/1
10 TABLET, EXTENDED RELEASE ORAL 2 TIMES DAILY
COMMUNITY

## 2024-01-18 RX ORDER — PREDNISONE 5 MG/1
7.5 TABLET ORAL DAILY
COMMUNITY

## 2024-01-18 RX ORDER — PREGABALIN 25 MG/1
50 CAPSULE ORAL 3 TIMES DAILY
Status: DISCONTINUED | OUTPATIENT
Start: 2024-01-18 | End: 2024-01-19 | Stop reason: HOSPADM

## 2024-01-18 RX ORDER — WARFARIN SODIUM 2 MG/1
TABLET ORAL
Status: ON HOLD | COMMUNITY
End: 2024-01-19

## 2024-01-18 RX ORDER — TRAZODONE HYDROCHLORIDE 50 MG/1
50 TABLET, FILM COATED ORAL
COMMUNITY

## 2024-01-18 RX ORDER — LEVOTHYROXINE SODIUM 50 UG/1
50 TABLET ORAL DAILY
Status: DISCONTINUED | OUTPATIENT
Start: 2024-01-18 | End: 2024-01-19 | Stop reason: HOSPADM

## 2024-01-18 RX ORDER — MULTIVITAMIN,THERAPEUTIC
1 TABLET ORAL DAILY
Status: DISCONTINUED | OUTPATIENT
Start: 2024-01-18 | End: 2024-01-18

## 2024-01-18 RX ORDER — NITROGLYCERIN 0.4 MG/1
0.4 TABLET SUBLINGUAL EVERY 5 MIN PRN
Status: DISCONTINUED | OUTPATIENT
Start: 2024-01-18 | End: 2024-01-19 | Stop reason: HOSPADM

## 2024-01-18 RX ORDER — TORSEMIDE 20 MG/1
20 TABLET ORAL 2 TIMES DAILY
COMMUNITY

## 2024-01-18 RX ORDER — PRASUGREL 10 MG/1
10 TABLET, FILM COATED ORAL DAILY
COMMUNITY

## 2024-01-18 RX ORDER — MIDODRINE HYDROCHLORIDE 10 MG/1
10 TABLET ORAL ONCE
Status: COMPLETED | OUTPATIENT
Start: 2024-01-18 | End: 2024-01-18

## 2024-01-18 RX ADMIN — METOPROLOL SUCCINATE 25 MG: 25 TABLET, EXTENDED RELEASE ORAL at 13:32

## 2024-01-18 RX ADMIN — MULTIPLE VITAMINS W/ MINERALS TAB 1 TABLET: TAB at 12:08

## 2024-01-18 RX ADMIN — SODIUM CHLORIDE 500 ML: 9 INJECTION, SOLUTION INTRAVENOUS at 20:31

## 2024-01-18 RX ADMIN — SODIUM CHLORIDE 8 MG/HR: 900 INJECTION INTRAVENOUS at 00:42

## 2024-01-18 RX ADMIN — MIDODRINE HYDROCHLORIDE 10 MG: 10 TABLET ORAL at 22:15

## 2024-01-18 RX ADMIN — PANTOPRAZOLE SODIUM 40 MG: 40 TABLET, DELAYED RELEASE ORAL at 22:15

## 2024-01-18 RX ADMIN — FENTANYL CITRATE 25 MCG: 50 INJECTION INTRAMUSCULAR; INTRAVENOUS at 01:18

## 2024-01-18 RX ADMIN — CLONIDINE HYDROCHLORIDE 0.1 MG: 0.1 TABLET ORAL at 03:22

## 2024-01-18 RX ADMIN — TRAZODONE HYDROCHLORIDE 50 MG: 50 TABLET ORAL at 22:53

## 2024-01-18 RX ADMIN — THIAMINE HYDROCHLORIDE 200 MG: 100 INJECTION, SOLUTION INTRAMUSCULAR; INTRAVENOUS at 03:22

## 2024-01-18 RX ADMIN — LEVOTHYROXINE SODIUM 50 MCG: 0.03 TABLET ORAL at 12:08

## 2024-01-18 RX ADMIN — CLONIDINE HYDROCHLORIDE 0.1 MG: 0.1 TABLET ORAL at 13:32

## 2024-01-18 RX ADMIN — FOLIC ACID 1 MG: 1 TABLET ORAL at 12:08

## 2024-01-18 RX ADMIN — PREGABALIN 50 MG: 25 CAPSULE ORAL at 22:15

## 2024-01-18 RX ADMIN — THIAMINE HYDROCHLORIDE 200 MG: 100 INJECTION, SOLUTION INTRAMUSCULAR; INTRAVENOUS at 10:32

## 2024-01-18 RX ADMIN — ATORVASTATIN CALCIUM 80 MG: 40 TABLET, FILM COATED ORAL at 12:08

## 2024-01-18 RX ADMIN — TORSEMIDE 20 MG: 10 TABLET ORAL at 13:32

## 2024-01-18 RX ADMIN — PANTOPRAZOLE SODIUM 40 MG: 40 INJECTION, POWDER, FOR SOLUTION INTRAVENOUS at 10:18

## 2024-01-18 RX ADMIN — PREGABALIN 50 MG: 25 CAPSULE ORAL at 13:32

## 2024-01-18 RX ADMIN — LISINOPRIL 10 MG: 10 TABLET ORAL at 13:32

## 2024-01-18 RX ADMIN — SERTRALINE HYDROCHLORIDE 100 MG: 50 TABLET ORAL at 12:08

## 2024-01-18 ASSESSMENT — ACTIVITIES OF DAILY LIVING (ADL)
ADLS_ACUITY_SCORE: 35
ADLS_ACUITY_SCORE: 31
ADLS_ACUITY_SCORE: 31
ADLS_ACUITY_SCORE: 34
ADLS_ACUITY_SCORE: 35
ADLS_ACUITY_SCORE: 34
ADLS_ACUITY_SCORE: 35
ADLS_ACUITY_SCORE: 31
ADLS_ACUITY_SCORE: 34
ADLS_ACUITY_SCORE: 34
ADLS_ACUITY_SCORE: 31
ADLS_ACUITY_SCORE: 31

## 2024-01-18 NOTE — ED TRIAGE NOTES
"Pt arrives from home via EMS for x1 week having black tarry stools with near syncope at home. Has hx of 6 months ago gi bleed with hospitalize and blood transfusion. At home feeling increased SOB on top of her baseline of 3.5 L NC for COPD. Denies abdominal pain . Shoulder and neck pain as well, pale color and increased edema in face and abdomen, and bilateral legs/feet pitting  BP 92/52   Pulse 102   Temp 98.6  F (37  C) (Tympanic)   Resp 20   Ht 1.6 m (5' 3\")   Wt 83.7 kg (184 lb 9.6 oz)   SpO2 97%   BMI 32.70 kg/m         Triage Assessment (Adult)       Row Name 01/17/24 1918          Triage Assessment    Airway WDL WDL        Respiratory WDL    Respiratory WDL X;rhythm/pattern     Rhythm/Pattern, Respiratory shortness of breath        Skin Circulation/Temperature WDL    Skin Circulation/Temperature WDL circulation     Skin Circulation pallor        Cardiac WDL    Cardiac WDL WDL        Peripheral/Neurovascular WDL    Capillary Refill, General less than/equal to 3 secs        Cognitive/Neuro/Behavioral WDL    Cognitive/Neuro/Behavioral WDL WDL        [place] Coma Scale    Best Eye Response 4-->(E4) spontaneous     Best Motor Response 6-->(M6) obeys commands     Best Verbal Response 5-->(V5) oriented     [place] Coma Scale Score 15                     "

## 2024-01-18 NOTE — PROGRESS NOTES
"[facility]    Medicine Progress Note - Hospitalist Service    Date of Admission:  1/17/2024    Assessment & Plan   Principal Problem:    Gastrointestinal hemorrhage    Assessment: present on admission. Upper GI bleed based on melena. Negative upper and lower endoscopy in August 2023. History of heavy alcohol use, along with warfarin for atrial fibrillation is likely cause. Received 2 unit PRBCs, hemoglobin stable. Normotensive, but quite fatigued.     Plan: transfer to medical floor   Serial hemoglobin    Continue BID protonix    Active Problems:    COPD (chronic obstructive pulmonary disease) (H)    Assessment: chronic, stable        Alcoholism (H)    Assessment: present on admission     Plan: CIWA protocol      Anemia due to blood loss, acute    Assessment: present on admission, transfused 2 units PRBCs        Acute on chronic combined systolic and diastolic congestive heart failure (H)    Assessment: present on admission     Plan: continue torsemide, lisinopril and metoprolol      Supratherapeutic INR    Assessment: discussed with Abad Maher from [facility]. Stopping warfarin permanently. High risk for bleed, falls.           Atrial fibrillation     Assessment: chronic    Plan: continue metoprolol, stop warfarin permanently.            Diet: Regular Diet Adult    DVT Prophylaxis: Pneumatic Compression Devices  Pérez Catheter: Not present  Lines: PRESENT             Cardiac Monitoring: None  Code Status: Full Code      Clinically Significant Risk Factors Present on Admission               # Drug Induced Coagulation Defect: home medication list includes an anticoagulant medication  # Drug Induced Platelet Defect: home medication list includes an antiplatelet medication   # Hypertension: Home medication list includes antihypertensive(s)      # Obesity: Estimated body mass index is 33.04 kg/m  as calculated from the following:    Height as of this encounter: 1.6 m (5' 3\").    Weight " as of this encounter: 84.6 kg (186 lb 8 oz).              Disposition Plan      Expected Discharge Date: 01/20/2024                    John Saldivar MD  Hospitalist Service  [facility]  Securely message with [employer] (more info)  Text page via [facility] Paging/Directory   ______________________________________________________________________    Interval History   Feels tired, fatigued. No nausea, vomiting. No abdomen pain.     Physical Exam   Vital Signs: Temp: 97.7  F (36.5  C) Temp src: Tympanic BP: 108/68 Pulse: 100   Resp: 25 SpO2: 97 % O2 Device: Nasal cannula Oxygen Delivery: 2.5 LPM  Weight: 186 lbs 8 oz    GENERAL: Comfortable, no apparent distress.  CARDIOVASCULAR: regular rate and rhythm, no murmur. No lower extremity edema   RESPIRATORY: Clear to auscultation bilaterally, no wheezes or crackles.  GI: non-tender, non-distended, normal bowel sounds.   SKIN: warm periphery, no rashes      Medical Decision Making       60 MINUTES SPENT BY ME on the date of service doing chart review, history, exam, documentation & further activities per the note.      Data     I have personally reviewed the following data over the past 24 hrs:    5.3  \   8.1 (L)   / 132 (L)     136 96 (L) 46.2 (H) /  105 (H)   4.2 30 (H) 0.75 \     ALT: 31 AST: 53 (H) AP: 75 TBILI: 0.7   ALB: 3.6 TOT PROTEIN: 5.9 (L) LIPASE: 11 (L)     Trop: 18 (H) BNP: 355     INR:  1.06 PTT:  50 (H)   D-dimer:  N/A Fibrinogen:  N/A       Imaging results reviewed over the past 24 hrs:   Recent Results (from the past 24 hour(s))   XR Chest Port 1 View    Narrative    Exam:  XR CHEST PORT 1 VIEW    HISTORY: sob, BLE edema.    COMPARISON:  6/17/2023    FINDINGS:     The cardiomediastinal contours are unchanged.      There are mildly prominent interstitial markings. No focal lesion. No  pleural effusion or pneumothorax.      No acute osseous abnormality.       Impression    IMPRESSION:      Findings compatible with mild CHF/fluid overload.       ROB SCHRADER MD         SYSTEM ID:  [id number]   CT Head w/o Contrast    Narrative    PROCEDURE INFORMATION:   Exam: CT Head Without Contrast   Exam date and time: 1/17/2024 11:41 PM   Age: 73 years old   Clinical indication: Injury or trauma; Fall and other: Intoxicated, anemic, on   coumadin, unknown falls; Blunt trauma (contusions or hematomas); Consciousness   not specified     TECHNIQUE:   Imaging protocol: Computed tomography of the head without contrast.   Radiation optimization: All CT scans at this facility use at least one of these   dose optimization techniques: automated exposure control; mA and/or kV   adjustment per patient size (includes targeted exams where dose is matched to   clinical indication); or iterative reconstruction.     COMPARISON:   CT HEAD W/O CONTRAST 6/17/2023 5:22 PM     FINDINGS:   Brain: Moderate white matter disease and volume loss are identified. There is   no acute infarct or edema. No hemorrhage.   Cerebral ventricles: No ventriculomegaly.   Paranasal sinuses: There is left maxillary sinus opacification.   Mastoid air cells: Visualized mastoid air cells are well aerated.   Bones/joints: Unremarkable. No acute fracture.   Soft tissues: Unremarkable.       Impression    IMPRESSION:   There are no acute concerning abnormalities.     THIS DOCUMENT HAS BEEN ELECTRONICALLY SIGNED BY YUNIER LEE MD   CT Cervical Spine w/o Contrast    Narrative    PROCEDURE INFORMATION:   Exam: CT Cervical Spine Without Contrast   Exam date and time: 1/17/2024 11:41 PM   Age: 73 years old   Clinical indication: Injury or trauma; Fall and other: Intoxicated, anemic, on   coumadin, unknown falls; Blunt trauma     TECHNIQUE:   Imaging protocol: Computed tomography of the cervical spine without contrast.   Radiation optimization: All CT scans at this facility use at least one of these   dose optimization techniques: automated exposure control; mA and/or kV   adjustment per patient size (includes  targeted exams where dose is matched to   clinical indication); or iterative reconstruction.     COMPARISON:   CT CERVICAL SPINE W/O CONTRAST 10/26/2023 1:39 PM     FINDINGS:   Bones/joints: No acute fracture. Normal alignment.     Degenerative change is identified in the spine.  There is disc space narrowing   and osteophyte formation especially at C3/4, C4/5, C5/6 and C6/7.     Lungs: Lung apices are normal.   Soft tissues: Unremarkable.       Impression    IMPRESSION:   There is no evidence for fracture or facet dislocation.     THIS DOCUMENT HAS BEEN ELECTRONICALLY SIGNED BY YUNIER LEE MD   CT Chest/Abdomen/Pelvis w Contrast    Narrative    PROCEDURE INFORMATION:   Exam: CT Chest With Contrast; Diagnostic   Exam date and time: 1/17/2024 11:54 PM   Age: 73 years old   Clinical indication: Injury or trauma; Fall and other: Intoxicated, anemic, on   coumadin, unknown falls; Blunt; Generalized; Swelling (edema)     TECHNIQUE:   Imaging protocol: Diagnostic computed tomography of the chest with contrast.   3D rendering (Not supervised by radiologist): MIP and/or 3D reconstructed   images were created by the technologist.   Radiation optimization: All CT scans at this facility use at least one of these   dose optimization techniques: automated exposure control; mA and/or kV   adjustment per patient size (includes targeted exams where dose is matched to   clinical indication); or iterative reconstruction.   Contrast material: ISOVUE 370; Contrast volume: 105 ml; Contrast route:   INTRAVENOUS (IV);      COMPARISON:   CT CHEST ABDOMEN PELVIS W/O CONTRAST 6/17/2023 10:38 PM     FINDINGS:   Lungs: Centrilobular emphysema with pulmonary hyperinflation.   Pleural spaces: Unremarkable. No pneumothorax. No pleural effusion.   Heart: Unremarkable. No cardiomegaly. No pericardial effusion.   Coronary arteries: Coronary artery calcifications are identified, considered a   risk factor for coronary artery disease.   Lymph nodes:  Unremarkable. No enlarged lymph nodes.   Vasculature: The left vertebral artery is patent. The right vertebral artery   does not opacify in the visualized lower neck. Clinical correlation with   additional imaging recommended as indicated. Dense atherosclerotic plaque at   the origin of the left common carotid artery with associated occlusion.     Bones/joints: Status post thoracotomy. Multiple healed bilateral rib fractures.   No acute rib fractures.   Soft tissues: Unremarkable.       Impression    IMPRESSION:   1.   No acute findings.   2.   Consider additional imaging of the neck vessels for further assessment as   clinically indicated.     COMMENTS:   The presence of pulmonary emphysema on CT is an independent risk factor for   lung cancer. In the absence of a history or active diagnosis of lung cancer, it   is recommended that this patient with emphysema be evaluated for enrollment in   a low dose CT lung cancer screening program.       =========================  PROCEDURE INFORMATION:   Exam: CT Abdomen And Pelvis With Contrast   Exam date and time: 1/17/2024 11:54 PM   Age: 73 years old   Clinical indication: Injury or trauma; Fall and other: Intoxicated, anemic, on   coumadin, unknown falls; Blunt; Generalized; Swelling (edema)     TECHNIQUE:   Imaging protocol: Computed tomography of the abdomen and pelvis with contrast.   3D rendering (Not supervised by radiologist): MIP and/or 3D reconstructed   images were created by the technologist.   Radiation optimization: All CT scans at this facility use at least one of these   dose optimization techniques: automated exposure control; mA and/or kV   adjustment per patient size (includes targeted exams where dose is matched to   clinical indication); or iterative reconstruction.   Contrast material: ISOVUE 370; Contrast volume: 105 ml; Contrast route:   INTRAVENOUS (IV);      COMPARISON:   CT CHEST ABDOMEN PELVIS W/O CONTRAST 6/17/2023 10:38 PM     FINDINGS:    Liver: Diffuse fatty infiltration of the liver.   Gallbladder and bile ducts: Normal. No calcified stones. No ductal dilation.   Pancreas: Normal. No ductal dilation.   Spleen: Normal. No splenomegaly.   Adrenal glands: Normal. No mass.   Kidneys and ureters: Right renal parenchymal scarring.   Stomach and bowel: Incidental note is made of a fatty infiltration of the wall   of the colon, extending from the cecum to the transverse colon, suggesting   prior inflammation.   Appendix: No evidence of appendicitis.     Intraperitoneal space: Unremarkable. No free air. No significant fluid   collection.   Vasculature: Unremarkable. No abdominal aortic aneurysm.   Lymph nodes: Unremarkable. No enlarged lymph nodes.   Urinary bladder: Unremarkable as visualized.   Reproductive: Unremarkable as visualized.   Bones/joints: Status post right total hip arthroplasty. Hardware appears well   positioned and intact.   Soft tissues: Unremarkable.     IMPRESSION:   No acute findings.     THIS DOCUMENT HAS BEEN ELECTRONICALLY SIGNED BY GARDENIA HAYNES MD

## 2024-01-18 NOTE — PROGRESS NOTES
Interdisciplinary Discharge Planning Note    Anticipated Discharge Date:1/18-1/19    Anticipated Discharge Location: Home     Clinical Needs Before Discharge:   advance diet, walk around     Treatment Needs After Discharge:  None identified    Potential Barriers to Discharge: None identified    OSMEL Cantu  1/18/2024,  12:57 PM

## 2024-01-18 NOTE — PLAN OF CARE
Problem: Gastrointestinal Bleeding  Goal: Optimal Coping with Acute Illness  Outcome: Not Progressing  Goal: Hemostasis  Outcome: Not Progressing     Problem: Alcohol Withdrawal  Goal: Alcohol Withdrawal Symptom Control  Outcome: Not Progressing  Goal: Optimal Neurologic Function  Outcome: Not Progressing  Goal: Readiness for Change Identified  Outcome: Not Progressing     Problem: Comorbidity Management  Goal: Maintenance of COPD Symptom Control  Outcome: Not Progressing   Goal Outcome Evaluation:

## 2024-01-18 NOTE — ED PROVIDER NOTES
History     Chief Complaint   Patient presents with    Rectal Bleeding     HPI  Natalee Montalvo is a 73 year old female who presents to the ED for evaluation of a rectal bleed. Pt arrives from home via EMS for x1 week having black tarry stools with near syncope at home. Has hx of 6 months ago gi bleed with hospitalize and blood transfusion. At home feeling increased SOB on top of her baseline of 3.5 L NC for COPD. Denies abdominal pain . Shoulder and neck pain as well, pale color and increased edema in face and abdomen, and bilateral legs/feet pitting.    Allergies:  Allergies   Allergen Reactions    Amoxicillin-Pot Clavulanate      Other reaction(s): GI intolerance  Stomach cramps    Bupropion      Other reaction(s): Other  Insomnia and tension    Nicotine Rash    Oxycodone Nausea    Pseudoephedrine      Other reaction(s): Increased blood pressure, Tachycardia  Advise not to use this medication which was in her allergy product.    Sertraline Rash       Problem List:    Patient Active Problem List    Diagnosis Date Noted    Gastrointestinal hemorrhage 01/17/2024     Priority: Medium    Supratherapeutic INR 01/17/2024     Priority: Medium    Thigh laceration, left, subsequent encounter 06/22/2023     Priority: Medium    Low blood pressure reading 06/22/2023     Priority: Medium    Anemia due to blood loss, acute 06/17/2023     Priority: Medium    Elevated troponin 06/17/2023     Priority: Medium    Acute pain of right shoulder 06/17/2023     Priority: Medium    Thigh laceration 06/17/2023     Priority: Medium    Fall 06/17/2023     Priority: Medium    Acute on chronic combined systolic and diastolic congestive heart failure (H) 01/19/2022     Priority: Medium    Abnormal nuclear stress test 06/10/2021     Priority: Medium     Formatting of this note might be different from the original.  Added automatically from request for surgery [id number]      Alcohol withdrawal (H) 04/05/2019     Priority: Medium     Hypovolemic shock (H) 04/01/2019     Priority: Medium    CAD (coronary artery disease) 04/01/2019     Priority: Medium    COPD (chronic obstructive pulmonary disease) (H) 04/01/2019     Priority: Medium    Alcoholism (H) 04/01/2019     Priority: Medium    Hypokalemia 04/01/2019     Priority: Medium    Hyponatremia 04/01/2019     Priority: Medium    Closed fracture of multiple ribs of right side 04/01/2019     Priority: Medium    Prolonged Q-T interval on ECG 04/01/2019     Priority: Medium    Alcoholic fatty liver 01/19/2019     Priority: Medium    History of non-ST elevation myocardial infarction (NSTEMI) 01/19/2019     Priority: Medium        Past Medical History:    Past Medical History:   Diagnosis Date    Alcoholism (H) 4/1/2019    CAD (coronary artery disease) 4/1/2019    COPD (chronic obstructive pulmonary disease) (H) 4/1/2019       Past Surgical History:    No past surgical history on file.    Family History:    No family history on file.    Social History:  Marital Status:   [5]  Social History     Tobacco Use    Smoking status: Every Day     Packs/day: .5     Types: Cigarettes    Smokeless tobacco: Never   Substance Use Topics    Alcohol use: Yes     Comment: daily    Drug use: No     Comment: Drug use: No        Medications:    albuterol (PROAIR HFA/PROVENTIL HFA/VENTOLIN HFA) 108 (90 BASE) MCG/ACT Inhaler  aspirin (ASA) 81 MG chewable tablet  atorvastatin (LIPITOR) 80 MG tablet  budesonide-formoterol (SYMBICORT) 160-4.5 MCG/ACT Inhaler  doxycycline hyclate (VIBRA-TABS) 100 MG tablet  ferrous fumarate 65 mg, [place]. FE,-Vitamin C 125 mg (VITRON C)  MG TABS tablet  fluticasone (FLONASE) 50 MCG/ACT nasal spray  folic acid (FOLVITE) 1 MG tablet  furosemide (LASIX) 20 MG tablet  ipratropium - albuterol 0.5 mg/2.5 mg/3 mL (DUONEB) 0.5-2.5 (3) MG/3ML neb solution  lisinopril (PRINIVIL/ZESTRIL) 5 MG tablet  metoprolol succinate ER (TOPROL-XL) 100 MG 24 hr tablet  multivitamin, therapeutic (THERA-VIT)  "TABS tablet  nitroGLYcerin (NITROSTAT) 0.4 MG sublingual tablet  omeprazole (PRILOSEC) 20 MG DR capsule  order for DME  saccharomyces boulardii (FLORASTOR) 250 MG capsule  silver sulfADIAZINE (SILVADENE) 1 % external cream  vitamin B1 (THIAMINE) 100 MG tablet          Review of Systems   Constitutional:  Positive for fatigue. Negative for chills and fever.   HENT:  Negative for congestion.    Eyes:  Negative for visual disturbance.   Respiratory:  Positive for shortness of breath. Negative for cough.    Cardiovascular:  Negative for chest pain.   Gastrointestinal:  Positive for blood in stool. Negative for abdominal pain.   Genitourinary:  Negative for hematuria.   Allergic/Immunologic: Negative for immunocompromised state.   Neurological:  Negative for syncope.       Physical Exam   BP: 92/52  Pulse: 102  Temp: 98.6  F (37  C)  Resp: 20  Height: 160 cm (5' 3\")  Weight: 83.7 kg (184 lb 9.6 oz)  SpO2: 97 %      Physical Exam  Constitutional:       General: She is not in acute distress.     Appearance: She is well-developed. She is ill-appearing. She is not diaphoretic.   HENT:      Head: Normocephalic and atraumatic.   Eyes:      General: No scleral icterus.     Conjunctiva/sclera: Conjunctivae normal.   Cardiovascular:      Rate and Rhythm: Regular rhythm. Tachycardia present.   Pulmonary:      Effort: Pulmonary effort is normal.      Breath sounds: Normal breath sounds.   Abdominal:      Palpations: Abdomen is soft.      Tenderness: There is no abdominal tenderness.   Genitourinary:     Rectum: Guaiac result positive.   Musculoskeletal:         General: No deformity.      Cervical back: Neck supple.   Lymphadenopathy:      Cervical: No cervical adenopathy.   Skin:     General: Skin is warm and dry.      Coloration: Skin is not jaundiced.      Findings: No rash.   Neurological:      General: No focal deficit present.      Mental Status: She is alert. Mental status is at baseline.   Psychiatric:         Mood and " Affect: Mood normal.         Behavior: Behavior normal.         ED Course                 Procedures         EKG read at 2000, , sinus tachycardia with occasional PAC.     Critical Care time:  none               Results for orders placed or performed during the hospital encounter of 01/17/24 (from the past 24 hour(s))   ABO/Rh type and screen    Narrative    The following orders were created for panel order ABO/Rh type and screen.  Procedure                               Abnormality         Status                     ---------                               -----------         ------                     Adult Type and Screen[[id number]]                            Final result                 Please view results for these tests on the individual orders.   Extra Tube    Narrative    The following orders were created for panel order Extra Tube.  Procedure                               Abnormality         Status                     ---------                               -----------         ------                     Extra Red Top Tube[[id number]]                               Final result                 Please view results for these tests on the individual orders.   Extra Red Top Tube   Result Value Ref Range    Hold Specimen [facility]    Adult Type and Screen   Result Value Ref Range    ABO/RH(D) A POS     Antibody Screen Negative Negative    SPECIMEN EXPIRATION DATE [id number]    CBC with platelets differential    Narrative    The following orders were created for panel order CBC with platelets differential.  Procedure                               Abnormality         Status                     ---------                               -----------         ------                     CBC with platelets and d...[[id number]]  Abnormal            Final result                 Please view results for these tests on the individual orders.   Comprehensive metabolic panel   Result Value Ref Range    Sodium 133 (L) 135 - 145  mmol/L    Potassium 4.1 3.4 - 5.3 mmol/L    Carbon Dioxide (CO2) 30 (H) 22 - 29 mmol/L    Anion Gap 12 7 - 15 mmol/L    Urea Nitrogen 54.6 (H) 8.0 - 23.0 mg/dL    Creatinine 0.88 0.51 - 0.95 mg/dL    GFR Estimate 68 >60 mL/min/1.73m2    Calcium 9.3 8.8 - 10.2 mg/dL    Chloride 91 (L) 98 - 107 mmol/L    Glucose 109 (H) 70 - 99 mg/dL    Alkaline Phosphatase 90 40 - 150 U/L    AST 53 (H) 0 - 45 U/L    ALT 34 0 - 50 U/L    Protein Total 6.5 6.4 - 8.3 g/dL    Albumin 4.0 3.5 - 5.2 g/dL    Bilirubin Total 0.3 <=1.2 mg/dL   INR   Result Value Ref Range    INR 3.23 (H) 0.85 - 1.15   Partial thromboplastin time   Result Value Ref Range    aPTT 50 (H) 22 - 38 Seconds   Troponin T, High Sensitivity   Result Value Ref Range    Troponin T, High Sensitivity 18 (H) <=14 ng/L   Nt probnp inpatient (BNP)   Result Value Ref Range    N terminal Pro BNP Inpatient 355 0 - 900 pg/mL   Magnesium   Result Value Ref Range    Magnesium 1.7 1.7 - 2.3 mg/dL   CBC with platelets and differential   Result Value Ref Range    WBC Count 5.9 4.0 - 11.0 10e3/uL    RBC Count 2.16 (L) 3.80 - 5.20 10e6/uL    Hemoglobin 6.5 (LL) 11.7 - 15.7 g/dL    Hematocrit 20.5 (L) 35.0 - 47.0 %    MCV 95 78 - 100 fL    MCH 30.1 26.5 - 33.0 pg    MCHC 31.7 31.5 - 36.5 g/dL    RDW 21.0 (H) 10.0 - 15.0 %    Platelet Count 147 (L) 150 - 450 10e3/uL    % Neutrophils 79 %    % Lymphocytes 14 %    % Monocytes 5 %    % Eosinophils 0 %    % Basophils 1 %    % Immature Granulocytes 1 %    NRBCs per 100 WBC 0 <1 /100    Absolute Neutrophils 4.6 1.6 - 8.3 10e3/uL    Absolute Lymphocytes 0.9 0.8 - 5.3 10e3/uL    Absolute Monocytes 0.3 0.0 - 1.3 10e3/uL    Absolute Eosinophils 0.0 0.0 - 0.7 10e3/uL    Absolute Basophils 0.0 0.0 - 0.2 10e3/uL    Absolute Immature Granulocytes 0.0 <=0.4 10e3/uL    Absolute NRBCs 0.0 10e3/uL   Ethanol GH   Result Value Ref Range    Alcohol ethyl 0.03 (H) <=0.01 g/dL   XR Chest Port 1 View    Narrative    Exam:  XR CHEST PORT 1 VIEW    HISTORY: sob,  BLE edema.    COMPARISON:  6/17/2023    FINDINGS:     The cardiomediastinal contours are unchanged.      There are mildly prominent interstitial markings. No focal lesion. No  pleural effusion or pneumothorax.      No acute osseous abnormality.       Impression    IMPRESSION:      Findings compatible with mild CHF/fluid overload.      ROB SCHRADER MD         SYSTEM ID:  [id number]   Occult blood stool   Result Value Ref Range    Occult Blood Positive (A) Negative   Prepare red blood cells (unit)   Result Value Ref Range    Blood Component Type Red Blood Cells     Product Code [id number]     Unit Status Issued     Unit Number [id number]     CROSSMATCH Compatible     CODING SYSTEM [id number]     ISSUE DATE AND TIME [id number]     UNIT ABO/RH A+     UNIT TYPE ISBT 6200    INR   Result Value Ref Range    INR 1.19 (H) 0.85 - 1.15       Medications   HOLD: warfarin (COUMADIN) therapy (has no administration in time range)   iopamidol (ISOVUE-370) solution 105 mL (has no administration in time range)   pantoprazole (PROTONIX) IV push injection 40 mg (has no administration in time range)   pantoprazole (PROTONIX) 80 mg in sodium chloride 0.9 % 100 mL infusion (has no administration in time range)   pantoprazole (PROTONIX) IV push injection 40 mg (40 mg Intravenous $Given 1/17/24 2002)   phytonadione 10 mg in sodium chloride 0.9 % 50 mL intermittent infusion (0 mg Intravenous Stopped 1/17/24 2205)   prothrombin 4 factor complex concentrate (KCENTRA) infusion 2,195 Units (2,195 Units Intravenous $Given 1/17/24 2209)   fentaNYL (PF) (SUBLIMAZE) injection 25 mcg (25 mcg Intravenous $Given 1/17/24 2233)       Assessments & Plan (with Medical Decision Making)   Pt appears to be in general poor health. She reports generalized abdominal discomfort that she says is chronic, no guarding or rebound. She is hypotensive and tachycardic.     Hgb 6.5, hemoccult positive, INR 3.23.     Patient given blood transfusion, Protonix bolus  and infusion, Kcentra, Vitamin K.     Blood pressures improved, HR decreased.    She lives alone, reports alcoholism. We will obtain CT imaging which is pending.     I s with Dr. Small who is comfortable keeping the patient here at [facility].poke patient is accepted to the ICU by Dr. Bee, nocturnist.        Bakari Plunkett PA-C              I have reviewed the nursing notes.    I have reviewed the findings, diagnosis, plan and need for follow up with the patient.             New Prescriptions    No medications on file       Final diagnoses:   Gastrointestinal hemorrhage   Supratherapeutic INR       1/17/2024   [facility]       Bakari Plunkett PA  01/17/24 2256

## 2024-01-18 NOTE — PHARMACY
Pharmacy - Transfer Medication Reconciliation     The patient's transfer medication orders have been compared to the medication administration record and to the Prior to Admissions Medications list - any noted discrepancies were resolved with the MD. (Patient transferred from ICU to Med/surg).    Thank you. Pharmacy will continue to monitor.     Casey Pacheco [facility] ....................  1/18/2024   2:46 PM

## 2024-01-18 NOTE — PROVIDER NOTIFICATION
01/18/24 0504   Valuables   Patient Belongings remains with patient   Patient Belongings Remaining with Patient cell phone/electronics;clothing   Did you bring any home meds/supplements to the hospital?  No     [facility] will make every effort per our policy to help keep your items safe while in the hospital.  If you choose to keep any items at the bedside, we cannot be held responsible for any items that are lost or broken.      List items sent to safe: NA    I have reviewed my belongings list on admission and verify that it is correct.     Patient signature_______________________________  Date/Time_____________________    2nd Staff person if patient unable to sign __________________________  Date/Time ______________________      I have received all my belongings noted above at discharge.    Patient signature________________________________  Date/Time  __________________________

## 2024-01-18 NOTE — PROGRESS NOTES
In pt chart pending transfer to [facility].   Christine Castro RN on 1/18/2024 at 1:32 PM

## 2024-01-18 NOTE — PROGRESS NOTES
Pt transferred to [facility] at 1350 from ICU. VSS. Denies pain. A&OX4. All belongings with pt. Report received from DORINA Williamson.

## 2024-01-18 NOTE — H&P
"Admit Date: 1/17/2024     History and Physical: Hospitalist Service    Chief Complaints:  Black stools      HPI:    73 years old obese female with a past medical history of COPD on home oxygen, hypertension, atrial fibrillation on Coumadin, coronary artery disease, fatty liver, alcohol abuse and multiple other medical issues presented emergency room for dark stools with dizziness and near syncopal event.  Denies any bright red blood.  Denies any vomiting but did have nausea.  Mild abdominal discomfort.  No diarrhea but the stools were soft and black but not bright red blood in color.  Currently on Coumadin and checks INR at home.  INR at home the previous day was 3.8 and patient eventually present to the emergency room.  Was noted to be tachycardic and labs revealed a hemoglobin of 6.5 with an INR of 3.23.  Patient was given Kcentra,, Protonix bolus with infusion and PRBC transfusion.  Was admitted for further evaluation    Review of symptoms:  12 point review of system is negative unless otherwise stated in history of present illness    Clinically Significant Risk Factors Present on Admission               # Coagulation Defect: INR = 1.19 (Ref range: 0.85 - 1.15) and/or PTT = 50 Seconds (Ref range: 22 - 38 Seconds), will monitor for bleeding  # Drug Induced Platelet Defect: home medication list includes an antiplatelet medication   # Hypertension: Home medication list includes antihypertensive(s)      # Obesity: Estimated body mass index is 33.04 kg/m  as calculated from the following:    Height as of this encounter: 1.6 m (5' 3\").    Weight as of this encounter: 84.6 kg (186 lb 8 oz).                 Past Medical History:   Diagnosis Date    Alcoholism (H) 4/1/2019    CAD (coronary artery disease) 4/1/2019    COPD (chronic obstructive pulmonary disease) (H) 4/1/2019       Principal Problem:    Supratherapeutic INR  Active Problems:    Alcoholism (H)    Anemia due to blood loss, acute    Low blood pressure " reading    Gastrointestinal hemorrhage    GI bleed        Current Facility-Administered Medications:     calcium carbonate (TUMS) chewable tablet 1,000 mg, 1,000 mg, Oral, 4x Daily PRN, Joe Bush MD    cloNIDine (CATAPRES) tablet 0.1 mg, 0.1 mg, Oral, Q8H, Joe Bush MD, 0.1 mg at 01/18/24 0322    diazepam (VALIUM) tablet 10 mg, 10 mg, Oral, Q30 Min PRN **OR** diazepam (VALIUM) injection 5-10 mg, 5-10 mg, Intravenous, Q30 Min PRN, Joe Bush MD    flumazenil (ROMAZICON) injection 0.2 mg, 0.2 mg, Intravenous, q1 min prn, Joe Bush MD    folic acid (FOLVITE) tablet 1 mg, 1 mg, Oral, Daily, Joe uBsh MD    OLANZapine zydis (zyPREXA) ODT tab 5-10 mg, 5-10 mg, Oral, Q6H PRN **OR** haloperidol lactate (HALDOL) injection 2.5-5 mg, 2.5-5 mg, Intravenous, Q6H PRN, Joe Bush MD    HOLD: warfarin (COUMADIN) therapy, , Does not apply, HOLD, Bakari Plunkett PA    melatonin tablet 6 mg, 6 mg, Oral, QPM PRN, Joe Bush MD    multivitamin w/minerals (THERA-VIT-M) tablet 1 tablet, 1 tablet, Oral, Daily, Joe Bush MD    ondansetron (ZOFRAN ODT) ODT tab 4 mg, 4 mg, Oral, Q6H PRN **OR** ondansetron (ZOFRAN) injection 4 mg, 4 mg, Intravenous, Q6H PRN, Joe Bush MD    pantoprazole (PROTONIX) 80 mg in sodium chloride 0.9 % 100 mL infusion, 8 mg/hr, Intravenous, Continuous, Bakari Plunkett PA, Last Rate: 10 mL/hr at 01/18/24 0231, 8 mg/hr at 01/18/24 0231    thiamine (B-1) injection 200 mg, 200 mg, Intravenous, Daily, Joe Bush MD, 200 mg at 01/18/24 0322    [START ON 1/19/2024] thiamine (B-1) tablet 100 mg, 100 mg, Oral, Daily, Joe Bush MD    traZODone (DESYREL) half-tab 25 mg, 25 mg, Oral, At Bedtime PRN **OR** traZODone (DESYREL) tablet  "50 mg, 50 mg, Oral, At Bedtime PRN, Joe Bush MD    History reviewed. No pertinent surgical history.    Allergies   Allergen Reactions    Amoxicillin-Pot Clavulanate      Other reaction(s): GI intolerance  Stomach cramps    Bupropion      Other reaction(s): Other  Insomnia and tension    Nicotine Rash    Oxycodone Nausea    Pseudoephedrine      Other reaction(s): Increased blood pressure, Tachycardia  Advise not to use this medication which was in her allergy product.    Sertraline Rash       No family history on file.    Social History     Socioeconomic History    Marital status:      Spouse name: None    Number of children: None    Years of education: None    Highest education level: None   Tobacco Use    Smoking status: Every Day     Packs/day: .5     Types: Cigarettes    Smokeless tobacco: Never   Substance and Sexual Activity    Alcohol use: Yes     Comment: daily    Drug use: No     Comment: Drug use: No       Physical Exam:  Vitals:    24 0200 24 0215 24 0233 24 0319   BP: 105/54 104/48 123/57    Pulse: 109 112 108    Resp: 11  16    Temp:   98.8  F (37.1  C)    TempSrc:   Tympanic    SpO2:   97%    Weight:    84.6 kg (186 lb 8 oz)   Height:          /57   Pulse 108   Temp 98.8  F (37.1  C) (Tympanic)   Resp 16   Ht 1.6 m (5' 3\")   Wt 84.6 kg (186 lb 8 oz)   SpO2 97%   BMI 33.04 kg/m    Systolic (24hrs), Av , Min:72 , Max:123   Diastolic (24hrs), Av, Min:38, Max:70      Intake/Output Summary (Last 24 hours) at 2024 0505  Last data filed at 2024 2245  Gross per 24 hour   Intake 300 ml   Output 475 ml   Net -175 ml       General:    not in acute distress, not in pain  Head:  atraumatic, normocephalic, face symmetrical  Eye:  conjunctivae clear , anicteric  Ear:  normal external ears, grossly normal hearing  Neck:  supple, no JVD  Respiratory:  no respiratory distress, no labored respirations, no shortness of breath,  CVS: " S1S2 heard. No murmur       I&O: I/O last 3 completed shifts:  In: 300   Out: 475 [Urine:475]    Lab Results:   CBC:   Lab Results   Component Value Date    WBC 5.9 01/17/2024    WBC 4.9 04/05/2019    RBC 2.16 01/17/2024    RBC 3.13 04/05/2019     BMP:   Lab Results   Component Value Date    POTASSIUM 4.1 01/17/2024    POTASSIUM 3.4 04/06/2019    CHLORIDE 91 01/17/2024    CHLORIDE 98 04/06/2019    BUN 54.6 01/17/2024    BUN 6 04/06/2019     CMP:  Lab Results   Component Value Date    POTASSIUM 4.1 01/17/2024    POTASSIUM 3.4 04/06/2019    CHLORIDE 91 01/17/2024    CHLORIDE 98 04/06/2019    ANIONGAP 12 01/17/2024    ANIONGAP 8 04/06/2019    BUN 54.6 01/17/2024    BUN 6 04/06/2019    ALBUMIN 4.0 01/17/2024    ALBUMIN 3.0 04/05/2019    AST 53 01/17/2024    AST 30 04/05/2019     Coagulation:   Lab Results   Component Value Date    INR 1.19 01/17/2024    PTT 50 01/17/2024        Assessment/Plan:      73 years old obese female with a past medical history of COPD on home oxygen, hypertension, atrial fibrillation on Coumadin, coronary artery disease, fatty liver, alcohol abuse and multiple other medical issues presented emergency room for dark stools with dizziness and near syncopal event.  Denies any bright red blood.  Denies any vomiting but did have nausea.  Mild abdominal discomfort.  No diarrhea but the stools were soft and black but not bright red blood in color.  Currently on Coumadin and checks INR at home.  INR at home the previous day was 3.8 and patient eventually present to the emergency room.  Was noted to be tachycardic and labs revealed a hemoglobin of 6.5 with an INR of 3.23.  Patient was given Kcentra,, Protonix bolus with infusion and PRBC transfusion.  Was admitted for further evaluation    1 acute blood loss anemia likely secondary to GI bleeding possibly upper GI bleed in the setting of Coumadin use and alcohol abuse  Transfuse to keep the hemoglobin above 7 and continue the Protonix infusion for now.   Surgery has been consulted for possible endoscopy.  Monitor closely.  Hold home Coumadin and trend INR     #2 atrial fibrillation.  Resume metoprolol but at a lower dose due to soft blood pressures.  Coumadin on hold due to GI bleed closely    3 COPD appears to be chronic and at baseline continue the home oxygen with as needed inhalers/nebulizers    4 alcohol abuse.  Initiate alcohol withdrawal protocol with vitamin B1 and trend closely.  For now no evidence of withdrawal symptoms    5 DVT prophylaxis will be with SCDs.  Avoid chemoprophylaxis due to GI bleed    6 coronary artery disease per resume the home statin/low-dose beta-blockers but hold off on aspirin and Coumadin due to GI bleed as noted above    Goals of care reviewed and patient is requested full CODE STATUS for now    Patient will be admitted under inpatient status.  Given the acute GI bleed with acute blood loss anemia needing blood transfusion and other intervention, patient meets criteria for inpatient with expected length of stay greater than 2 midnights      Our patient will be admitted under the hospitalist services and further management to be based on patient's clinical progress.    As the provider for the telehealth service, I attest that I introduced myself to the patient and provided my credentials. Based on review of the patient s chart and/or a discussion with members of the patient s treatment team, I determined that telemedicine via a real-time, two-way, interactive audio and video platform is an appropriate means of providing this service. The patient and I mutually agreed that this visit is appropriate for telemedicine as well.    The patient was located at [facility]. Joe MASON was at [place], [place]. The encounter was approximately 10 minutes. The nurse was present for the duration of the encounter.    Chart reviewed,labs and available imaging reviewed,consultant notes reviewed. Previous available medical records  have been reviewed  Care plan discussed with care team    I have seen and examined the patient today. Documentation for this visit was completed using a template. Everything documented was personally performed today with the necessary additions, deletions and changes made as appropriate with the diagnoses being listed in no particular order of clinical relevance.    Rudy Cardoza MD MPh  Securely message with the Vocera Web Console (learn more here)  Text page via [employer] Paging/Directory

## 2024-01-18 NOTE — PLAN OF CARE
Goal Outcome Evaluation:  Pt transferred from ICU to [facility] today. A&OX4. VSS. Denies pain. LS clear dim. O2 on chronic 2 LPM. SpO2 high 90s-100. SOB, BRUNER. Frequent congested cough. Incontinent of urine. External catheter applied to maintain skin integrity. Urine output adequate. Clear/pale in color. Chronic numbness to feet. Skin has multiple large scattered bruises. Up with SBA.

## 2024-01-18 NOTE — PHARMACY-ADMISSION MEDICATION HISTORY
Pharmacist Admission Medication History    Admission medication history is complete. The information provided in this note is only as accurate as the sources available at the time of the update.    Information Source(s): Patient via in-person, sure scripts, Care Everywhere    Pertinent Information: Patient states she used to have homecare assist with medication setup approximately 6 months ago but is now managing medications on her own.  Patient states she uses a pill box and is compliant with her medications.      Patient cannot afford the copays associated with her inhalers and states some other medications are also very expensive.  Patient informed of [facility] program to help with cost of these medications.  Will need follow up.    Patient confirms she has been taking aspirin, prasugrel and warfarin    Patient confirms she recently completed a course of doxycycline 1/3-1/13 for bronchitis    Changes made to PTA medication list:  Added: allopurinol, hydroxyzine prn, levothyroxine, ondansetron prn, pantoprazole, potassium, prasugrel, prednisone (for PMR), pregabalin, sertraline, torsemide, trazodone, warfarin  Deleted: albuterol inhaler, Symbicort inhaler, doxycycline, Vitron-C, Flonase, furosemide, omeprazole, Florastor, Silvadene cream  Changed: lisinopril from 5 mg to 10 mg, metoprolol ER from 100 mg to 25 mg, thiamine from daily to M-W-F, multivitamin to women's multivitamin    Medication Affordability:  Not including over the counter (OTC) medications, was there a time in the past 3 months when you did not take your medications as prescribed because of cost?: Yes (cannot afford inhalers, Jardiance, some other chronic meds based on cost/copay. states has Medicare and BCBS)    Allergies reviewed with patient and updates made in EHR: yes    Medication History Completed By: Rossi Perry RPH 1/18/2024 9:27 AM    PTA Med List   Medication Sig Last Dose    allopurinol (ZYLOPRIM) 100 MG tablet Take 100  mg by mouth 2 times daily 1/16/2024 at BID    hydrOXYzine bari (VISTARIL) 25 MG capsule Take 25 mg by mouth daily as needed for anxiety Past Week at USING PRN    ipratropium - albuterol 0.5 mg/2.5 mg/3 mL (DUONEB) 0.5-2.5 (3) MG/3ML neb solution Take 1 vial by nebulization every 4 hours as needed for shortness of breath / dyspnea or wheezing More than a month at NOT USING CURRENTLY BUT HAS A FEW AT HOME    levothyroxine (SYNTHROID/LEVOTHROID) 50 MCG tablet Take 50 mcg by mouth daily 1/16/2024 at AM    Multiple Vitamins-Minerals (WOMENS MULTIVITAMIN) TABS Take 1 tablet by mouth daily 1/16/2024 at AM    nitroGLYcerin (NITROSTAT) 0.4 MG sublingual tablet Place 0.4 mg under the tongue every 5 minutes as needed for chest pain For chest pain place 1 tablet under the tongue every 5 minutes for 3 doses. If symptoms persist 5 minutes after 1st dose call 911. More than a month at NO USE    ondansetron (ZOFRAN) 4 MG tablet Take 4 mg by mouth every 12 hours as needed for nausea or vomiting Past Month at PRN    order for DME Equipment being ordered: [employer] Wheels ()  Treatment Diagnosis: weakness. COPD Unknown at N/A    pantoprazole (PROTONIX) 40 MG EC tablet Take 40 mg by mouth 2 times daily 1/16/2024 at BID    potassium chloride ER (KLOR-CON M) 10 MEQ CR tablet Take 10 mEq by mouth 2 times daily 1/16/2024 at BID    prasugrel (EFFIENT) 10 MG TABS tablet Take 10 mg by mouth daily 1/16/2024 at AM    predniSONE (DELTASONE) 5 MG tablet Take 10 mg by mouth daily X3 more days, then decrease to 7.5 mg daily until bottle runs out 1/16/2024 at AM    pregabalin (LYRICA) 50 MG capsule Take 50 mg by mouth 3 times daily 1/16/2024 at TID    sertraline (ZOLOFT) 100 MG tablet Take 100 mg by mouth daily 1/16/2024 at AM    thiamine (B-1) 100 MG tablet Take 100 mg by mouth Every Mon, Wed, Fri Morning 1/15/2024 at AM    torsemide (DEMADEX) 20 MG tablet Take 20 mg by mouth 2 times daily 1/16/2024 at BID    traZODone (DESYREL) 50 MG tablet Take  50 mg by mouth nightly as needed for sleep 1/16/2024 at HS    warfarin ANTICOAGULANT (COUMADIN) 2 MG tablet Take 3 mg by mouth on Mondays and 4 mg by mouth on all other days or as directed by protime clinic 1/16/2024 at PM

## 2024-01-18 NOTE — PROGRESS NOTES
Admission Note    Data:  Natalee Montalvo admitted to ICU from emergency room at 0230.      Action:  Dr. Cristal Levine has been notified of admission. Pt oriented to unit, call light in reach.     Response:  Patient is alert and oriented and has no complaints at this time. Patient had blood and Protonix infusing upon admission. Blood completed and awaiting redraw results. Angel Chamberlain RN on 1/18/2024 at 5:22 AM

## 2024-01-18 NOTE — PROGRESS NOTES
1.  Has the patient had a previous reaction to IV contrast? No    2.  Does the patient have kidney disease? No    3.  Is the patient on dialysis? No    If YES to any of these questions, exam will be reviewed with a Radiologist before administering contrast.      IV Contrast- Discharge Instructions After Your CT Scan      The IV contrast you received today will be filtered from your bloodstream by your kidneys during the next 24 hours and pass from the body in urine.  You will not be aware of this process and your urine will not change in color.  To help this process you should drink at least 4 additional glasses of water or juice today.  This reduces stress on your kidneys.    Most contrast reactions are immediate.  Should you develop symptoms of concern after discharge, contact the department at the number below.  After hours you should contact your personal physician.  If you develop breathing distress or wheezing, call 911.

## 2024-01-19 VITALS
HEART RATE: 74 BPM | SYSTOLIC BLOOD PRESSURE: 105 MMHG | DIASTOLIC BLOOD PRESSURE: 62 MMHG | TEMPERATURE: 97.4 F | OXYGEN SATURATION: 96 % | WEIGHT: 190.6 LBS | HEIGHT: 63 IN | RESPIRATION RATE: 16 BRPM | BODY MASS INDEX: 33.77 KG/M2

## 2024-01-19 LAB
ANION GAP SERPL CALCULATED.3IONS-SCNC: 7 MMOL/L (ref 7–15)
BUN SERPL-MCNC: 26.5 MG/DL (ref 8–23)
CALCIUM SERPL-MCNC: 9.3 MG/DL (ref 8.8–10.2)
CHLORIDE SERPL-SCNC: 101 MMOL/L (ref 98–107)
CREAT SERPL-MCNC: 0.65 MG/DL (ref 0.51–0.95)
DEPRECATED HCO3 PLAS-SCNC: 33 MMOL/L (ref 22–29)
EGFRCR SERPLBLD CKD-EPI 2021: >90 ML/MIN/1.73M2
ERYTHROCYTE [DISTWIDTH] IN BLOOD BY AUTOMATED COUNT: 20.5 % (ref 10–15)
GLUCOSE SERPL-MCNC: 105 MG/DL (ref 70–99)
HCT VFR BLD AUTO: 25.2 % (ref 35–47)
HGB BLD-MCNC: 8.2 G/DL (ref 11.7–15.7)
HGB BLD-MCNC: 9 G/DL (ref 11.7–15.7)
MCH RBC QN AUTO: 30.4 PG (ref 26.5–33)
MCHC RBC AUTO-ENTMCNC: 32.5 G/DL (ref 31.5–36.5)
MCV RBC AUTO: 93 FL (ref 78–100)
PLATELET # BLD AUTO: 121 10E3/UL (ref 150–450)
POTASSIUM SERPL-SCNC: 3.9 MMOL/L (ref 3.4–5.3)
RBC # BLD AUTO: 2.7 10E6/UL (ref 3.8–5.2)
SODIUM SERPL-SCNC: 141 MMOL/L (ref 135–145)
WBC # BLD AUTO: 4.3 10E3/UL (ref 4–11)

## 2024-01-19 PROCEDURE — 99239 HOSP IP/OBS DSCHRG MGMT >30: CPT | Performed by: INTERNAL MEDICINE

## 2024-01-19 PROCEDURE — 85018 HEMOGLOBIN: CPT | Performed by: INTERNAL MEDICINE

## 2024-01-19 PROCEDURE — 85027 COMPLETE CBC AUTOMATED: CPT | Performed by: INTERNAL MEDICINE

## 2024-01-19 PROCEDURE — 250N000013 HC RX MED GY IP 250 OP 250 PS 637: Performed by: INTERNAL MEDICINE

## 2024-01-19 PROCEDURE — 36415 COLL VENOUS BLD VENIPUNCTURE: CPT | Performed by: INTERNAL MEDICINE

## 2024-01-19 PROCEDURE — 80048 BASIC METABOLIC PNL TOTAL CA: CPT | Performed by: INTERNAL MEDICINE

## 2024-01-19 PROCEDURE — 250N000013 HC RX MED GY IP 250 OP 250 PS 637: Performed by: HOSPITALIST

## 2024-01-19 PROCEDURE — 250N000011 HC RX IP 250 OP 636: Performed by: INTERNAL MEDICINE

## 2024-01-19 PROCEDURE — 36416 COLLJ CAPILLARY BLOOD SPEC: CPT | Performed by: INTERNAL MEDICINE

## 2024-01-19 RX ORDER — IPRATROPIUM BROMIDE AND ALBUTEROL SULFATE 2.5; .5 MG/3ML; MG/3ML
1 SOLUTION RESPIRATORY (INHALATION) EVERY 4 HOURS PRN
Qty: 90 ML | Refills: 11 | Status: SHIPPED | OUTPATIENT
Start: 2024-01-19

## 2024-01-19 RX ADMIN — PREGABALIN 50 MG: 25 CAPSULE ORAL at 06:37

## 2024-01-19 RX ADMIN — TORSEMIDE 20 MG: 10 TABLET ORAL at 07:55

## 2024-01-19 RX ADMIN — FOLIC ACID 1 MG: 1 TABLET ORAL at 09:26

## 2024-01-19 RX ADMIN — METOPROLOL SUCCINATE 25 MG: 25 TABLET, EXTENDED RELEASE ORAL at 09:26

## 2024-01-19 RX ADMIN — ATORVASTATIN CALCIUM 80 MG: 40 TABLET, FILM COATED ORAL at 09:26

## 2024-01-19 RX ADMIN — LISINOPRIL 10 MG: 10 TABLET ORAL at 09:26

## 2024-01-19 RX ADMIN — THIAMINE HYDROCHLORIDE 200 MG: 100 INJECTION, SOLUTION INTRAMUSCULAR; INTRAVENOUS at 09:26

## 2024-01-19 RX ADMIN — MULTIPLE VITAMINS W/ MINERALS TAB 1 TABLET: TAB at 09:26

## 2024-01-19 RX ADMIN — LEVOTHYROXINE SODIUM 50 MCG: 0.03 TABLET ORAL at 09:26

## 2024-01-19 RX ADMIN — PANTOPRAZOLE SODIUM 40 MG: 40 TABLET, DELAYED RELEASE ORAL at 09:26

## 2024-01-19 RX ADMIN — SERTRALINE HYDROCHLORIDE 100 MG: 50 TABLET ORAL at 09:26

## 2024-01-19 ASSESSMENT — ACTIVITIES OF DAILY LIVING (ADL)
ADLS_ACUITY_SCORE: 34
ADLS_ACUITY_SCORE: 38
ADLS_ACUITY_SCORE: 34
ADLS_ACUITY_SCORE: 34

## 2024-01-19 NOTE — PLAN OF CARE
VSS, afebrile, denies pain.  Pt needed encouragement to get up this morning, to the chair to eat.  She stated she wanted to take a shower, but wouldn't get up to do so until she was told discharge time is 1100.  Then she was motivated to get moving.  She arranged for her sister to pick her up.  Blanca Thomson RN............................ 1/19/2024 11:33 AM

## 2024-01-19 NOTE — PROGRESS NOTES
:     Received a call from Farheen from [facility]. She stated they received a MAARC report from patients family. Answered all necessary questions.     OSMEL Cantu on 1/19/2024 at 4:04 PM

## 2024-01-19 NOTE — DISCHARGE SUMMARY
"[facility]  Hospitalist Discharge Summary      Date of Admission:  1/17/2024  Date of Discharge:  1/19/2024  Discharging Provider: John Saldivar MD  Discharge Service: Hospitalist Service    Discharge Diagnoses   Principal Problem:    Gastrointestinal hemorrhage  Active Problems:    COPD (chronic obstructive pulmonary disease) (H)    Alcoholism (H)    Anemia due to blood loss, acute    Acute on chronic combined systolic and diastolic congestive heart failure (H)    Low blood pressure reading    Supratherapeutic INR     Atrial fibrillation     Clinically Significant Risk Factors     # Obesity: Estimated body mass index is 33.76 kg/m  as calculated from the following:    Height as of this encounter: 1.6 m (5' 3\").    Weight as of this encounter: 86.5 kg (190 lb 9.6 oz).       Follow-ups Needed After Discharge   Follow-up Appointments     Follow-up and recommended labs and tests       Follow up with Abad Maher on 2/8/2024 at 11:30 AM            Discharge Disposition   Discharged to home  Condition at discharge: Stable    Hospital Course   Principal Problem:    Gastrointestinal hemorrhage    Assessment: present on admission. Upper GI bleed based on melena. Negative upper and lower endoscopy in August 2023. History of heavy alcohol use, along with warfarin for atrial fibrillation is likely cause. Received 2 unit PRBCs, hemoglobin stable. Normotensive, discharged home.     Active Problems:    COPD (chronic obstructive pulmonary disease) (H)    Assessment: chronic, stable        Alcoholism (H)    Assessment: present on admission     Plan: CIWA protocol      Anemia due to blood loss, acute    Assessment: present on admission, transfused 2 units PRBCs        Acute on chronic combined systolic and diastolic congestive heart failure (H)    Assessment: present on admission     Plan: continue torsemide, lisinopril and metoprolol      Supratherapeutic INR    Assessment: discussed with Abad Maher " from [facility]. Stopping warfarin permanently. High risk for bleed, falls.           Atrial fibrillation     Assessment: chronic    Plan: continue metoprolol, stop warfarin permanently.      Consultations This Hospital Stay   SURGERY GENERAL IP CONSULT    Code Status   Full Code    Time Spent on this Encounter   I, John Saldivar MD, personally saw the patient today and spent greater than 30 minutes discharging this patient.       John Saldivar MD  [facility]  [address]  [place] [address]  Phone: [phone]  Fax: [phone]  ______________________________________________________________________    Physical Exam   Vital Signs: Temp: 97.4  F (36.3  C) Temp src: Tympanic BP: 105/62 Pulse: 74   Resp: 16 SpO2: 96 % O2 Device: Nasal cannula Oxygen Delivery: 2 LPM  Weight: 190 lbs 9.6 oz  GENERAL: Comfortable, no apparent distress.  CARDIOVASCULAR: regular rate and rhythm, no murmur. No lower extremity edema   RESPIRATORY: Clear to auscultation bilaterally, no wheezes or crackles.  GI: non-tender, non-distended, normal bowel sounds.   SKIN: warm periphery, no rashes         Primary Care Physician   Helga Jacinto    Discharge Orders      Reason for your hospital stay    Acute blood loss anemia     Follow-up and recommended labs and tests     Follow up with Abad Maher on 2/8/2024 at 11:30 AM     Activity    Your activity upon discharge: activity as tolerated     Diet    Follow this diet upon discharge: Orders Placed This Encounter      Regular Diet Adult       Significant Results and Procedures   Most Recent 3 CBC's:  Recent Labs   Lab Test 01/19/24  0455 01/18/24  2155 01/18/24  1106 01/18/24  0539 01/18/24  0010 01/17/24 2035   WBC 4.3  --   --  5.3  --  5.9   HGB 8.2* 7.8* 8.1* 8.2*   < > 6.5*   MCV 93  --   --  91  --  95   *  --   --  132*  --  147*    < > = values in this interval not displayed.     Most Recent 3 BMP's:  Recent Labs   Lab Test  01/19/24  0450 01/18/24  0539 01/17/24 2035    136 133*   POTASSIUM 3.9 4.2 4.1   CHLORIDE 101 96* 91*   CO2 33* 30* 30*   BUN 26.5* 46.2* 54.6*   CR 0.65 0.75 0.88   ANIONGAP 7 10 12   FELICIANO 9.3 8.9 9.3   * 105* 109*     Most Recent 2 LFT's:  Recent Labs   Lab Test 01/18/24  0539 01/17/24 2035 06/22/23 2026   AST  --  53* 25   ALT 31 34 20   ALKPHOS 75 90 86   BILITOTAL 0.7 0.3 0.4   ,   Results for orders placed or performed during the hospital encounter of 01/17/24   XR Chest Port 1 View    Narrative    Exam:  XR CHEST PORT 1 VIEW    HISTORY: sob, BLE edema.    COMPARISON:  6/17/2023    FINDINGS:     The cardiomediastinal contours are unchanged.      There are mildly prominent interstitial markings. No focal lesion. No  pleural effusion or pneumothorax.      No acute osseous abnormality.       Impression    IMPRESSION:      Findings compatible with mild CHF/fluid overload.      ROB SCHRADER MD         SYSTEM ID:  [id number]   CT Head w/o Contrast    Narrative    PROCEDURE INFORMATION:   Exam: CT Head Without Contrast   Exam date and time: 1/17/2024 11:41 PM   Age: 73 years old   Clinical indication: Injury or trauma; Fall and other: Intoxicated, anemic, on   coumadin, unknown falls; Blunt trauma (contusions or hematomas); Consciousness   not specified     TECHNIQUE:   Imaging protocol: Computed tomography of the head without contrast.   Radiation optimization: All CT scans at this facility use at least one of these   dose optimization techniques: automated exposure control; mA and/or kV   adjustment per patient size (includes targeted exams where dose is matched to   clinical indication); or iterative reconstruction.     COMPARISON:   CT HEAD W/O CONTRAST 6/17/2023 5:22 PM     FINDINGS:   Brain: Moderate white matter disease and volume loss are identified. There is   no acute infarct or edema. No hemorrhage.   Cerebral ventricles: No ventriculomegaly.   Paranasal sinuses: There is left maxillary  sinus opacification.   Mastoid air cells: Visualized mastoid air cells are well aerated.   Bones/joints: Unremarkable. No acute fracture.   Soft tissues: Unremarkable.       Impression    IMPRESSION:   There are no acute concerning abnormalities.     THIS DOCUMENT HAS BEEN ELECTRONICALLY SIGNED BY YUNIER LEE MD   CT Cervical Spine w/o Contrast    Narrative    PROCEDURE INFORMATION:   Exam: CT Cervical Spine Without Contrast   Exam date and time: 1/17/2024 11:41 PM   Age: 73 years old   Clinical indication: Injury or trauma; Fall and other: Intoxicated, anemic, on   coumadin, unknown falls; Blunt trauma     TECHNIQUE:   Imaging protocol: Computed tomography of the cervical spine without contrast.   Radiation optimization: All CT scans at this facility use at least one of these   dose optimization techniques: automated exposure control; mA and/or kV   adjustment per patient size (includes targeted exams where dose is matched to   clinical indication); or iterative reconstruction.     COMPARISON:   CT CERVICAL SPINE W/O CONTRAST 10/26/2023 1:39 PM     FINDINGS:   Bones/joints: No acute fracture. Normal alignment.     Degenerative change is identified in the spine.  There is disc space narrowing   and osteophyte formation especially at C3/4, C4/5, C5/6 and C6/7.     Lungs: Lung apices are normal.   Soft tissues: Unremarkable.       Impression    IMPRESSION:   There is no evidence for fracture or facet dislocation.     THIS DOCUMENT HAS BEEN ELECTRONICALLY SIGNED BY YUNIER LEE MD   CT Chest/Abdomen/Pelvis w Contrast    Narrative    PROCEDURE INFORMATION:   Exam: CT Chest With Contrast; Diagnostic   Exam date and time: 1/17/2024 11:54 PM   Age: 73 years old   Clinical indication: Injury or trauma; Fall and other: Intoxicated, anemic, on   coumadin, unknown falls; Blunt; Generalized; Swelling (edema)     TECHNIQUE:   Imaging protocol: Diagnostic computed tomography of the chest with contrast.   3D rendering (Not  supervised by radiologist): MIP and/or 3D reconstructed   images were created by the technologist.   Radiation optimization: All CT scans at this facility use at least one of these   dose optimization techniques: automated exposure control; mA and/or kV   adjustment per patient size (includes targeted exams where dose is matched to   clinical indication); or iterative reconstruction.   Contrast material: ISOVUE 370; Contrast volume: 105 ml; Contrast route:   INTRAVENOUS (IV);      COMPARISON:   CT CHEST ABDOMEN PELVIS W/O CONTRAST 6/17/2023 10:38 PM     FINDINGS:   Lungs: Centrilobular emphysema with pulmonary hyperinflation.   Pleural spaces: Unremarkable. No pneumothorax. No pleural effusion.   Heart: Unremarkable. No cardiomegaly. No pericardial effusion.   Coronary arteries: Coronary artery calcifications are identified, considered a   risk factor for coronary artery disease.   Lymph nodes: Unremarkable. No enlarged lymph nodes.   Vasculature: The left vertebral artery is patent. The right vertebral artery   does not opacify in the visualized lower neck. Clinical correlation with   additional imaging recommended as indicated. Dense atherosclerotic plaque at   the origin of the left common carotid artery with associated occlusion.     Bones/joints: Status post thoracotomy. Multiple healed bilateral rib fractures.   No acute rib fractures.   Soft tissues: Unremarkable.       Impression    IMPRESSION:   1.   No acute findings.   2.   Consider additional imaging of the neck vessels for further assessment as   clinically indicated.     COMMENTS:   The presence of pulmonary emphysema on CT is an independent risk factor for   lung cancer. In the absence of a history or active diagnosis of lung cancer, it   is recommended that this patient with emphysema be evaluated for enrollment in   a low dose CT lung cancer screening program.       =========================  PROCEDURE INFORMATION:   Exam: CT Abdomen And Pelvis With  Contrast   Exam date and time: 1/17/2024 11:54 PM   Age: 73 years old   Clinical indication: Injury or trauma; Fall and other: Intoxicated, anemic, on   coumadin, unknown falls; Blunt; Generalized; Swelling (edema)     TECHNIQUE:   Imaging protocol: Computed tomography of the abdomen and pelvis with contrast.   3D rendering (Not supervised by radiologist): MIP and/or 3D reconstructed   images were created by the technologist.   Radiation optimization: All CT scans at this facility use at least one of these   dose optimization techniques: automated exposure control; mA and/or kV   adjustment per patient size (includes targeted exams where dose is matched to   clinical indication); or iterative reconstruction.   Contrast material: ISOVUE 370; Contrast volume: 105 ml; Contrast route:   INTRAVENOUS (IV);      COMPARISON:   CT CHEST ABDOMEN PELVIS W/O CONTRAST 6/17/2023 10:38 PM     FINDINGS:   Liver: Diffuse fatty infiltration of the liver.   Gallbladder and bile ducts: Normal. No calcified stones. No ductal dilation.   Pancreas: Normal. No ductal dilation.   Spleen: Normal. No splenomegaly.   Adrenal glands: Normal. No mass.   Kidneys and ureters: Right renal parenchymal scarring.   Stomach and bowel: Incidental note is made of a fatty infiltration of the wall   of the colon, extending from the cecum to the transverse colon, suggesting   prior inflammation.   Appendix: No evidence of appendicitis.     Intraperitoneal space: Unremarkable. No free air. No significant fluid   collection.   Vasculature: Unremarkable. No abdominal aortic aneurysm.   Lymph nodes: Unremarkable. No enlarged lymph nodes.   Urinary bladder: Unremarkable as visualized.   Reproductive: Unremarkable as visualized.   Bones/joints: Status post right total hip arthroplasty. Hardware appears well   positioned and intact.   Soft tissues: Unremarkable.     IMPRESSION:   No acute findings.     THIS DOCUMENT HAS BEEN ELECTRONICALLY SIGNED BY GARDENIA  MD IVY       Discharge Medications   Current Discharge Medication List        CONTINUE these medications which have NOT CHANGED    Details   allopurinol (ZYLOPRIM) 100 MG tablet Take 100 mg by mouth 2 times daily      hydrOXYzine bari (VISTARIL) 25 MG capsule Take 25 mg by mouth daily as needed for anxiety      ipratropium - albuterol 0.5 mg/2.5 mg/3 mL (DUONEB) 0.5-2.5 (3) MG/3ML neb solution Take 1 vial by nebulization every 4 hours as needed for shortness of breath / dyspnea or wheezing      levothyroxine (SYNTHROID/LEVOTHROID) 50 MCG tablet Take 50 mcg by mouth daily      Multiple Vitamins-Minerals (WOMENS MULTIVITAMIN) TABS Take 1 tablet by mouth daily      nitroGLYcerin (NITROSTAT) 0.4 MG sublingual tablet Place 0.4 mg under the tongue every 5 minutes as needed for chest pain For chest pain place 1 tablet under the tongue every 5 minutes for 3 doses. If symptoms persist 5 minutes after 1st dose call 911.      ondansetron (ZOFRAN) 4 MG tablet Take 4 mg by mouth every 12 hours as needed for nausea or vomiting      order for DME Equipment being ordered: [employer] ()  Treatment Diagnosis: weakness. COPD  Qty: 1 each, Refills: 0    Associated Diagnoses: Chronic obstructive pulmonary disease with acute exacerbation (H)      pantoprazole (PROTONIX) 40 MG EC tablet Take 40 mg by mouth 2 times daily      potassium chloride ER (KLOR-CON M) 10 MEQ CR tablet Take 10 mEq by mouth 2 times daily      prasugrel (EFFIENT) 10 MG TABS tablet Take 10 mg by mouth daily      predniSONE (DELTASONE) 5 MG tablet Take 10 mg by mouth daily X3 more days, then decrease to 7.5 mg daily until bottle runs out      pregabalin (LYRICA) 50 MG capsule Take 50 mg by mouth 3 times daily      sertraline (ZOLOFT) 100 MG tablet Take 100 mg by mouth daily      thiamine (B-1) 100 MG tablet Take 100 mg by mouth Every Mon, Wed, Fri Morning      torsemide (DEMADEX) 20 MG tablet Take 20 mg by mouth 2 times daily      traZODone (DESYREL) 50  MG tablet Take 50 mg by mouth nightly as needed for sleep      aspirin (ASA) 81 MG chewable tablet Take 81 mg by mouth daily      atorvastatin (LIPITOR) 80 MG tablet Take 80 mg by mouth daily       folic acid (FOLVITE) 1 MG tablet Take 1 tablet (1 mg) by mouth daily  Qty: 100 tablet, Refills: 0    Associated Diagnoses: Chronic obstructive pulmonary disease with acute exacerbation (H)      lisinopril (ZESTRIL) 10 MG tablet Take 10 mg by mouth daily      metoprolol succinate ER (TOPROL XL) 25 MG 24 hr tablet Take 25 mg by mouth daily           STOP taking these medications       albuterol (PROAIR HFA/PROVENTIL HFA/VENTOLIN HFA) 108 (90 BASE) MCG/ACT Inhaler Comments:   Reason for Stopping:         warfarin ANTICOAGULANT (COUMADIN) 2 MG tablet Comments:   Reason for Stopping:             Allergies   Allergies   Allergen Reactions    Amoxicillin-Pot Clavulanate      Other reaction(s): GI intolerance  Stomach cramps    Bupropion      Other reaction(s): Other  Insomnia and tension    Nicotine Rash     After open heart surgery; back broke out in severe rash; has not retrialed    Oxycodone Nausea    Pseudoephedrine      Other reaction(s): Increased blood pressure, Tachycardia  Advise not to use this medication which was in her allergy product.

## 2024-01-19 NOTE — PLAN OF CARE
Pt admitted on 1/17/24 for GI hemorrhage. A&Ox4. VSS except soft BP, afebrile. Bolus given on shift with no improvement to BP. Midodrine given, slight increase to BP. Frequent BP checks throughout shift, see flowsheet. BP improved later in shift. Hemoglobin checks q 6 hours per Dr. Joe Bee, tele-nocturnist. CIWA measurement q 4 hrs, pt scoring 0-2 on shift. SBA in room, limiting ambulation due to low BP on shift. External catheter in place. Adequate output. No BM's on shift. No signs of bleeding on shift. Pt has scattered bruising, most notable bruise being on right chest. Denies pain. LS clear/diminished. O2 chronic 2 LPM. Frequent, congested cough. Chronic numbness/tingling in feet BLE.     Theresa Velasquez RN .......  1/19/2024  6:09 AM

## 2024-01-19 NOTE — PLAN OF CARE
Pt having soft BP's. Provider notified. Ordered 500 mL bolus. BP's still soft after bolus. Provider ordered midodrine 10 mg. Rechecking BP frequently - see flowsheet. Hemoglobin checked at 2155 and q 6 hours after.     Theresa Velasquez RN .......  1/19/2024  2:18 AM

## 2024-01-19 NOTE — PROGRESS NOTES
[facility] DISCHARGE NOTE    Patient discharged to home at 11:28 AM via wheel chair. Accompanied by sister and staff. Discharge instructions reviewed with patient, opportunity offered to ask questions. Prescriptions sent to patients preferred pharmacy. All belongings sent with patient.    Blanca Thomson RN

## 2024-01-19 NOTE — PHARMACY - DISCHARGE MEDICATION RECONCILIATION AND EDUCATION
Pharmacy:  Discharge Counseling and Medication Reconciliation    Natalee A Katia  [address]  [facility] [address]  [phone] (home)   73 year old female  PCP: Helga Jacinto    Allergies: Amoxicillin-pot clavulanate, Bupropion, Nicotine, Oxycodone, and Pseudoephedrine    Discharge Counseling:    Pharmacist met with patient (and/or family) today to review the medication portion of the After Visit Summary (with an emphasis on NEW medications) and to address patient's questions/concerns.    Summary of Education: Met with patient to discuss stopping warfarin.  Also discussed inhalers and discussed community care program.  Patient has declined at this time but has requested a refill on her Duonebs.  [facility] was [facility] to [facility] per her request    Materials Provided:  MedCounselor sheets printed from Clinical Pharmacology on: none    Discharge Medication Reconciliation:    It has been determined that the patient has an adequate supply of medications available or which can be obtained from the patient's preferred pharmacy, which HE/SHE has confirmed as: [facility]    Thank you for the consult.    Rossi Perry RP........January 19, 2024 9:18 AM

## 2024-01-19 NOTE — PLAN OF CARE
SAFETY CHECKLIST  ID Bands and Risk clasps correct and in place (DNR, Fall risk, Allergy, Latex, Limb):  Yes  All Lines Reconciled and labeled correctly: Yes  Whiteboard updated:Yes  Environmental interventions: Yes  Verify Tele #: N/A    Theresa Velasquez RN .......  1/18/2024  7:16 PM

## 2024-01-20 LAB
ATRIAL RATE - MUSE: 103 BPM
DIASTOLIC BLOOD PRESSURE - MUSE: NORMAL MMHG
INTERPRETATION ECG - MUSE: NORMAL
P AXIS - MUSE: 75 DEGREES
PR INTERVAL - MUSE: 128 MS
QRS DURATION - MUSE: 88 MS
QT - MUSE: 350 MS
QTC - MUSE: 458 MS
R AXIS - MUSE: 37 DEGREES
SYSTOLIC BLOOD PRESSURE - MUSE: NORMAL MMHG
T AXIS - MUSE: 54 DEGREES
VENTRICULAR RATE- MUSE: 103 BPM

## 2024-01-22 ENCOUNTER — PATIENT OUTREACH (OUTPATIENT)
Dept: FAMILY MEDICINE | Facility: OTHER | Age: 74
End: 2024-01-22
Payer: COMMERCIAL

## 2024-01-22 NOTE — TELEPHONE ENCOUNTER
Patient has PCP elsewhere, no follow-up here. No TCM call required per policy.  Patient to follow up with cardiology   Appointment on 2/8/24    Bess Villafana RN on 1/22/2024 at 8:09 AM

## 2024-02-18 ENCOUNTER — APPOINTMENT (OUTPATIENT)
Dept: GENERAL RADIOLOGY | Facility: OTHER | Age: 74
DRG: 178 | End: 2024-02-18
Attending: STUDENT IN AN ORGANIZED HEALTH CARE EDUCATION/TRAINING PROGRAM
Payer: MEDICARE

## 2024-02-18 ENCOUNTER — HOSPITAL ENCOUNTER (INPATIENT)
Facility: OTHER | Age: 74
LOS: 7 days | Discharge: HOME HEALTH | DRG: 178 | End: 2024-02-25
Attending: FAMILY MEDICINE | Admitting: INTERNAL MEDICINE
Payer: MEDICARE

## 2024-02-18 DIAGNOSIS — J69.0 ASPIRATION PNEUMONIA OF RIGHT LOWER LOBE, UNSPECIFIED ASPIRATION PNEUMONIA TYPE (H): ICD-10-CM

## 2024-02-18 DIAGNOSIS — I50.9 ACUTE ON CHRONIC CONGESTIVE HEART FAILURE, UNSPECIFIED HEART FAILURE TYPE (H): ICD-10-CM
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
## 2024-02-18 DIAGNOSIS — Z20.822 LAB TEST NEGATIVE FOR COVID-19 VIRUS: ICD-10-CM

## 2024-02-18 DIAGNOSIS — J44.1 CHRONIC OBSTRUCTIVE PULMONARY DISEASE WITH ACUTE EXACERBATION (H): ICD-10-CM

## 2024-02-18 DIAGNOSIS — F10.10 ALCOHOL ABUSE: ICD-10-CM

## 2024-02-18 PROBLEM — E83.42 HYPOMAGNESEMIA: Status: ACTIVE | Noted: 2024-02-18

## 2024-02-18 PROBLEM — I25.10 CAD (CORONARY ARTERY DISEASE): Status: ACTIVE | Noted: 2019-04-01

## 2024-02-18 LAB
ALBUMIN SERPL BCG-MCNC: 3.9 G/DL (ref 3.5–5.2)
ALP SERPL-CCNC: 91 U/L (ref 40–150)
ALT SERPL W P-5'-P-CCNC: 27 U/L (ref 0–50)
ANION GAP SERPL CALCULATED.3IONS-SCNC: 8 MMOL/L (ref 7–15)
AST SERPL W P-5'-P-CCNC: 34 U/L (ref 0–45)
BASE EXCESS BLDV CALC-SCNC: 12.3 MMOL/L (ref -3–3)
BASOPHILS # BLD AUTO: 0 10E3/UL (ref 0–0.2)
BASOPHILS NFR BLD AUTO: 1 %
BILIRUB SERPL-MCNC: <0.2 MG/DL
BUN SERPL-MCNC: 35 MG/DL (ref 8–23)
CALCIUM SERPL-MCNC: 9.5 MG/DL (ref 8.8–10.2)
CHLORIDE SERPL-SCNC: 95 MMOL/L (ref 98–107)
CREAT SERPL-MCNC: 0.91 MG/DL (ref 0.51–0.95)
DEPRECATED HCO3 PLAS-SCNC: 36 MMOL/L (ref 22–29)
EGFRCR SERPLBLD CKD-EPI 2021: 65 ML/MIN/1.73M2
EOSINOPHIL # BLD AUTO: 0 10E3/UL (ref 0–0.7)
EOSINOPHIL NFR BLD AUTO: 0 %
ERYTHROCYTE [DISTWIDTH] IN BLOOD BY AUTOMATED COUNT: 17.5 % (ref 10–15)
ETHANOL SERPL-MCNC: 0.12 G/DL
FLUAV RNA SPEC QL NAA+PROBE: NEGATIVE
FLUBV RNA RESP QL NAA+PROBE: NEGATIVE
GLUCOSE SERPL-MCNC: 149 MG/DL (ref 70–99)
HCO3 BLDV-SCNC: 39 MMOL/L (ref 21–28)
HCT VFR BLD AUTO: 25.6 % (ref 35–47)
HEMOCCULT STL QL: NEGATIVE
HGB BLD-MCNC: 7.8 G/DL (ref 11.7–15.7)
HOLD SPECIMEN: NORMAL
IMM GRANULOCYTES # BLD: 0 10E3/UL
IMM GRANULOCYTES NFR BLD: 1 %
LACTATE SERPL-SCNC: 1.8 MMOL/L (ref 0.7–2)
LYMPHOCYTES # BLD AUTO: 1.3 10E3/UL (ref 0.8–5.3)
LYMPHOCYTES NFR BLD AUTO: 25 %
MAGNESIUM SERPL-MCNC: 1.5 MG/DL (ref 1.7–2.3)
MCH RBC QN AUTO: 29.8 PG (ref 26.5–33)
MCHC RBC AUTO-ENTMCNC: 30.5 G/DL (ref 31.5–36.5)
MCV RBC AUTO: 98 FL (ref 78–100)
MONOCYTES # BLD AUTO: 0.4 10E3/UL (ref 0–1.3)
MONOCYTES NFR BLD AUTO: 7 %
NEUTROPHILS # BLD AUTO: 3.5 10E3/UL (ref 1.6–8.3)
NEUTROPHILS NFR BLD AUTO: 66 %
NRBC # BLD AUTO: 0 10E3/UL
NRBC BLD AUTO-RTO: 0 /100
NT-PROBNP SERPL-MCNC: 1485 PG/ML (ref 0–900)
O2/TOTAL GAS SETTING VFR VENT: 95 %
OXYHGB MFR BLDV: 19 % (ref 70–75)
PCO2 BLDV: 69 MM HG (ref 40–50)
PH BLDV: 7.36 [PH] (ref 7.32–7.43)
PHOSPHATE SERPL-MCNC: 3.5 MG/DL (ref 2.5–4.5)
PLATELET # BLD AUTO: 135 10E3/UL (ref 150–450)
PO2 BLDV: 15 MM HG (ref 25–47)
POTASSIUM SERPL-SCNC: 4.4 MMOL/L (ref 3.4–5.3)
PROT SERPL-MCNC: 6.5 G/DL (ref 6.4–8.3)
RBC # BLD AUTO: 2.62 10E6/UL (ref 3.8–5.2)
RSV RNA SPEC NAA+PROBE: NEGATIVE
SAO2 % BLDV: 20.3 % (ref 70–75)
SARS-COV-2 RNA RESP QL NAA+PROBE: NEGATIVE
SODIUM SERPL-SCNC: 139 MMOL/L (ref 135–145)
WBC # BLD AUTO: 5.2 10E3/UL (ref 4–11)

## 2024-02-18 PROCEDURE — 87637 SARSCOV2&INF A&B&RSV AMP PRB: CPT | Performed by: FAMILY MEDICINE

## 2024-02-18 PROCEDURE — 83735 ASSAY OF MAGNESIUM: CPT | Mod: 91 | Performed by: INTERNAL MEDICINE

## 2024-02-18 PROCEDURE — 82077 ASSAY SPEC XCP UR&BREATH IA: CPT | Performed by: FAMILY MEDICINE

## 2024-02-18 PROCEDURE — 94640 AIRWAY INHALATION TREATMENT: CPT

## 2024-02-18 PROCEDURE — 36415 COLL VENOUS BLD VENIPUNCTURE: CPT | Performed by: INTERNAL MEDICINE

## 2024-02-18 PROCEDURE — 84100 ASSAY OF PHOSPHORUS: CPT | Performed by: INTERNAL MEDICINE

## 2024-02-18 PROCEDURE — 85025 COMPLETE CBC W/AUTO DIFF WBC: CPT | Performed by: STUDENT IN AN ORGANIZED HEALTH CARE EDUCATION/TRAINING PROGRAM

## 2024-02-18 PROCEDURE — 82040 ASSAY OF SERUM ALBUMIN: CPT | Performed by: STUDENT IN AN ORGANIZED HEALTH CARE EDUCATION/TRAINING PROGRAM

## 2024-02-18 PROCEDURE — 96365 THER/PROPH/DIAG IV INF INIT: CPT | Performed by: STUDENT IN AN ORGANIZED HEALTH CARE EDUCATION/TRAINING PROGRAM

## 2024-02-18 PROCEDURE — 84100 ASSAY OF PHOSPHORUS: CPT | Performed by: FAMILY MEDICINE

## 2024-02-18 PROCEDURE — 93010 ELECTROCARDIOGRAM REPORT: CPT | Performed by: INTERNAL MEDICINE

## 2024-02-18 PROCEDURE — 82565 ASSAY OF CREATININE: CPT | Performed by: INTERNAL MEDICINE

## 2024-02-18 PROCEDURE — 83735 ASSAY OF MAGNESIUM: CPT | Performed by: STUDENT IN AN ORGANIZED HEALTH CARE EDUCATION/TRAINING PROGRAM

## 2024-02-18 PROCEDURE — 83605 ASSAY OF LACTIC ACID: CPT | Performed by: STUDENT IN AN ORGANIZED HEALTH CARE EDUCATION/TRAINING PROGRAM

## 2024-02-18 PROCEDURE — 250N000013 HC RX MED GY IP 250 OP 250 PS 637: Performed by: FAMILY MEDICINE

## 2024-02-18 PROCEDURE — 999N000157 HC STATISTIC RCP TIME EA 10 MIN

## 2024-02-18 PROCEDURE — 82805 BLOOD GASES W/O2 SATURATION: CPT | Performed by: STUDENT IN AN ORGANIZED HEALTH CARE EDUCATION/TRAINING PROGRAM

## 2024-02-18 PROCEDURE — 99285 EMERGENCY DEPT VISIT HI MDM: CPT | Mod: 25 | Performed by: STUDENT IN AN ORGANIZED HEALTH CARE EDUCATION/TRAINING PROGRAM

## 2024-02-18 PROCEDURE — 250N000009 HC RX 250: Performed by: STUDENT IN AN ORGANIZED HEALTH CARE EDUCATION/TRAINING PROGRAM

## 2024-02-18 PROCEDURE — 83880 ASSAY OF NATRIURETIC PEPTIDE: CPT | Performed by: STUDENT IN AN ORGANIZED HEALTH CARE EDUCATION/TRAINING PROGRAM

## 2024-02-18 PROCEDURE — 36415 COLL VENOUS BLD VENIPUNCTURE: CPT | Performed by: STUDENT IN AN ORGANIZED HEALTH CARE EDUCATION/TRAINING PROGRAM

## 2024-02-18 PROCEDURE — 99285 EMERGENCY DEPT VISIT HI MDM: CPT | Performed by: STUDENT IN AN ORGANIZED HEALTH CARE EDUCATION/TRAINING PROGRAM

## 2024-02-18 PROCEDURE — 120N000001 HC R&B MED SURG/OB

## 2024-02-18 PROCEDURE — 99222 1ST HOSP IP/OBS MODERATE 55: CPT | Mod: GQ | Performed by: INTERNAL MEDICINE

## 2024-02-18 PROCEDURE — 93005 ELECTROCARDIOGRAM TRACING: CPT | Performed by: STUDENT IN AN ORGANIZED HEALTH CARE EDUCATION/TRAINING PROGRAM

## 2024-02-18 PROCEDURE — 250N000011 HC RX IP 250 OP 636: Performed by: FAMILY MEDICINE

## 2024-02-18 PROCEDURE — 250N000011 HC RX IP 250 OP 636: Performed by: STUDENT IN AN ORGANIZED HEALTH CARE EDUCATION/TRAINING PROGRAM

## 2024-02-18 PROCEDURE — 82272 OCCULT BLD FECES 1-3 TESTS: CPT | Performed by: FAMILY MEDICINE

## 2024-02-18 PROCEDURE — 96375 TX/PRO/DX INJ NEW DRUG ADDON: CPT | Performed by: STUDENT IN AN ORGANIZED HEALTH CARE EDUCATION/TRAINING PROGRAM

## 2024-02-18 PROCEDURE — 250N000013 HC RX MED GY IP 250 OP 250 PS 637: Performed by: INTERNAL MEDICINE

## 2024-02-18 PROCEDURE — 71045 X-RAY EXAM CHEST 1 VIEW: CPT | Mod: TC

## 2024-02-18 RX ORDER — TORSEMIDE 10 MG/1
20 TABLET ORAL 2 TIMES DAILY
Status: DISCONTINUED | OUTPATIENT
Start: 2024-02-18 | End: 2024-02-18

## 2024-02-18 RX ORDER — PREGABALIN 25 MG/1
50 CAPSULE ORAL 3 TIMES DAILY
Status: DISCONTINUED | OUTPATIENT
Start: 2024-02-18 | End: 2024-02-25 | Stop reason: HOSPADM

## 2024-02-18 RX ORDER — ASPIRIN 81 MG/1
81 TABLET, CHEWABLE ORAL DAILY
Status: DISCONTINUED | OUTPATIENT
Start: 2024-02-19 | End: 2024-02-19

## 2024-02-18 RX ORDER — FOLIC ACID 1 MG/1
1 TABLET ORAL DAILY
Status: DISCONTINUED | OUTPATIENT
Start: 2024-02-19 | End: 2024-02-18

## 2024-02-18 RX ORDER — ONDANSETRON 4 MG/1
4 TABLET, ORALLY DISINTEGRATING ORAL EVERY 6 HOURS PRN
Status: DISCONTINUED | OUTPATIENT
Start: 2024-02-18 | End: 2024-02-25 | Stop reason: HOSPADM

## 2024-02-18 RX ORDER — AMOXICILLIN 250 MG
2 CAPSULE ORAL 2 TIMES DAILY PRN
Status: DISCONTINUED | OUTPATIENT
Start: 2024-02-18 | End: 2024-02-25 | Stop reason: HOSPADM

## 2024-02-18 RX ORDER — AMOXICILLIN 250 MG
1 CAPSULE ORAL 2 TIMES DAILY PRN
Status: DISCONTINUED | OUTPATIENT
Start: 2024-02-18 | End: 2024-02-25 | Stop reason: HOSPADM

## 2024-02-18 RX ORDER — LORAZEPAM 2 MG/ML
1-2 INJECTION INTRAMUSCULAR EVERY 30 MIN PRN
Status: DISCONTINUED | OUTPATIENT
Start: 2024-02-18 | End: 2024-02-18

## 2024-02-18 RX ORDER — PANTOPRAZOLE SODIUM 40 MG/1
40 TABLET, DELAYED RELEASE ORAL 2 TIMES DAILY
Status: DISCONTINUED | OUTPATIENT
Start: 2024-02-18 | End: 2024-02-22

## 2024-02-18 RX ORDER — OXYCODONE HYDROCHLORIDE 5 MG/1
5 TABLET ORAL EVERY 4 HOURS PRN
Status: DISCONTINUED | OUTPATIENT
Start: 2024-02-18 | End: 2024-02-25 | Stop reason: HOSPADM

## 2024-02-18 RX ORDER — FOLIC ACID 5 MG/ML
1 INJECTION, SOLUTION INTRAMUSCULAR; INTRAVENOUS; SUBCUTANEOUS DAILY
Status: DISCONTINUED | OUTPATIENT
Start: 2024-02-19 | End: 2024-02-18

## 2024-02-18 RX ORDER — FLUMAZENIL 0.1 MG/ML
0.2 INJECTION, SOLUTION INTRAVENOUS
Status: DISCONTINUED | OUTPATIENT
Start: 2024-02-18 | End: 2024-02-25 | Stop reason: HOSPADM

## 2024-02-18 RX ORDER — MULTIPLE VITAMINS W/ MINERALS TAB 9MG-400MCG
1 TAB ORAL DAILY
Status: DISCONTINUED | OUTPATIENT
Start: 2024-02-19 | End: 2024-02-25 | Stop reason: HOSPADM

## 2024-02-18 RX ORDER — AMPICILLIN AND SULBACTAM 2; 1 G/1; G/1
3 INJECTION, POWDER, FOR SOLUTION INTRAMUSCULAR; INTRAVENOUS ONCE
Status: COMPLETED | OUTPATIENT
Start: 2024-02-18 | End: 2024-02-18

## 2024-02-18 RX ORDER — FUROSEMIDE 10 MG/ML
80 INJECTION INTRAMUSCULAR; INTRAVENOUS ONCE
Qty: 10 ML | Refills: 0 | Status: COMPLETED | OUTPATIENT
Start: 2024-02-18 | End: 2024-02-18

## 2024-02-18 RX ORDER — ONDANSETRON 4 MG/1
4 TABLET, ORALLY DISINTEGRATING ORAL EVERY 12 HOURS PRN
Status: DISCONTINUED | OUTPATIENT
Start: 2024-02-18 | End: 2024-02-18

## 2024-02-18 RX ORDER — ATORVASTATIN CALCIUM 40 MG/1
80 TABLET, FILM COATED ORAL DAILY
Status: DISCONTINUED | OUTPATIENT
Start: 2024-02-19 | End: 2024-02-25 | Stop reason: HOSPADM

## 2024-02-18 RX ORDER — METOPROLOL SUCCINATE 25 MG/1
25 TABLET, EXTENDED RELEASE ORAL DAILY
Status: DISCONTINUED | OUTPATIENT
Start: 2024-02-19 | End: 2024-02-25 | Stop reason: HOSPADM

## 2024-02-18 RX ORDER — FOLIC ACID 1 MG/1
1 TABLET ORAL ONCE
Status: COMPLETED | OUTPATIENT
Start: 2024-02-18 | End: 2024-02-18

## 2024-02-18 RX ORDER — OLANZAPINE 5 MG/1
5-10 TABLET, ORALLY DISINTEGRATING ORAL EVERY 6 HOURS PRN
Status: DISCONTINUED | OUTPATIENT
Start: 2024-02-18 | End: 2024-02-25 | Stop reason: HOSPADM

## 2024-02-18 RX ORDER — IPRATROPIUM BROMIDE AND ALBUTEROL SULFATE 2.5; .5 MG/3ML; MG/3ML
3 SOLUTION RESPIRATORY (INHALATION) ONCE
Status: COMPLETED | OUTPATIENT
Start: 2024-02-18 | End: 2024-02-18

## 2024-02-18 RX ORDER — IPRATROPIUM BROMIDE AND ALBUTEROL SULFATE 2.5; .5 MG/3ML; MG/3ML
1 SOLUTION RESPIRATORY (INHALATION) EVERY 4 HOURS PRN
Status: DISCONTINUED | OUTPATIENT
Start: 2024-02-18 | End: 2024-02-25 | Stop reason: HOSPADM

## 2024-02-18 RX ORDER — FOLIC ACID 1 MG/1
1 TABLET ORAL DAILY
Status: DISCONTINUED | OUTPATIENT
Start: 2024-02-19 | End: 2024-02-25 | Stop reason: HOSPADM

## 2024-02-18 RX ORDER — GUAIFENESIN 600 MG/1
600 TABLET, EXTENDED RELEASE ORAL 2 TIMES DAILY
Status: DISCONTINUED | OUTPATIENT
Start: 2024-02-18 | End: 2024-02-25 | Stop reason: HOSPADM

## 2024-02-18 RX ORDER — TRAZODONE HYDROCHLORIDE 50 MG/1
50 TABLET, FILM COATED ORAL
Status: DISCONTINUED | OUTPATIENT
Start: 2024-02-18 | End: 2024-02-25 | Stop reason: HOSPADM

## 2024-02-18 RX ORDER — LORAZEPAM 2 MG/ML
1-2 INJECTION INTRAMUSCULAR EVERY 30 MIN PRN
Status: DISCONTINUED | OUTPATIENT
Start: 2024-02-18 | End: 2024-02-25 | Stop reason: HOSPADM

## 2024-02-18 RX ORDER — HYDROXYZINE PAMOATE 25 MG/1
25 CAPSULE ORAL DAILY PRN
Status: DISCONTINUED | OUTPATIENT
Start: 2024-02-18 | End: 2024-02-25 | Stop reason: HOSPADM

## 2024-02-18 RX ORDER — NYSTATIN 100000 [USP'U]/G
POWDER TOPICAL 2 TIMES DAILY
Status: DISCONTINUED | OUTPATIENT
Start: 2024-02-18 | End: 2024-02-25 | Stop reason: HOSPADM

## 2024-02-18 RX ORDER — FOLIC ACID 5 MG/ML
1 INJECTION, SOLUTION INTRAMUSCULAR; INTRAVENOUS; SUBCUTANEOUS ONCE
Status: DISCONTINUED | OUTPATIENT
Start: 2024-02-18 | End: 2024-02-18

## 2024-02-18 RX ORDER — MAGNESIUM SULFATE 1 G/100ML
1 INJECTION INTRAVENOUS ONCE
Status: COMPLETED | OUTPATIENT
Start: 2024-02-18 | End: 2024-02-18

## 2024-02-18 RX ORDER — LANOLIN ALCOHOL/MO/W.PET/CERES
6 CREAM (GRAM) TOPICAL
Status: DISCONTINUED | OUTPATIENT
Start: 2024-02-18 | End: 2024-02-25 | Stop reason: HOSPADM

## 2024-02-18 RX ORDER — LIDOCAINE 40 MG/G
CREAM TOPICAL
Status: DISCONTINUED | OUTPATIENT
Start: 2024-02-18 | End: 2024-02-25 | Stop reason: HOSPADM

## 2024-02-18 RX ORDER — METOPROLOL TARTRATE 1 MG/ML
5 INJECTION, SOLUTION INTRAVENOUS EVERY 6 HOURS PRN
Status: DISCONTINUED | OUTPATIENT
Start: 2024-02-18 | End: 2024-02-25 | Stop reason: HOSPADM

## 2024-02-18 RX ORDER — ENOXAPARIN SODIUM 100 MG/ML
40 INJECTION SUBCUTANEOUS EVERY 24 HOURS
Status: DISCONTINUED | OUTPATIENT
Start: 2024-02-19 | End: 2024-02-20

## 2024-02-18 RX ORDER — HALOPERIDOL 5 MG/ML
2.5-5 INJECTION INTRAMUSCULAR EVERY 6 HOURS PRN
Status: DISCONTINUED | OUTPATIENT
Start: 2024-02-18 | End: 2024-02-25 | Stop reason: HOSPADM

## 2024-02-18 RX ORDER — LORAZEPAM 1 MG/1
1-2 TABLET ORAL EVERY 30 MIN PRN
Status: DISCONTINUED | OUTPATIENT
Start: 2024-02-18 | End: 2024-02-25 | Stop reason: HOSPADM

## 2024-02-18 RX ORDER — LEVOTHYROXINE SODIUM 50 UG/1
50 TABLET ORAL DAILY
Status: DISCONTINUED | OUTPATIENT
Start: 2024-02-19 | End: 2024-02-25 | Stop reason: HOSPADM

## 2024-02-18 RX ORDER — HALOPERIDOL 5 MG/ML
2.5-5 INJECTION INTRAMUSCULAR EVERY 4 HOURS PRN
Status: DISCONTINUED | OUTPATIENT
Start: 2024-02-18 | End: 2024-02-18

## 2024-02-18 RX ORDER — MULTIPLE VITAMINS W/ MINERALS TAB 9MG-400MCG
1 TAB ORAL DAILY
Status: DISCONTINUED | OUTPATIENT
Start: 2024-02-18 | End: 2024-02-18

## 2024-02-18 RX ORDER — ONDANSETRON 2 MG/ML
4 INJECTION INTRAMUSCULAR; INTRAVENOUS EVERY 6 HOURS PRN
Status: DISCONTINUED | OUTPATIENT
Start: 2024-02-18 | End: 2024-02-25 | Stop reason: HOSPADM

## 2024-02-18 RX ORDER — THIAMINE HYDROCHLORIDE 100 MG/ML
200 INJECTION, SOLUTION INTRAMUSCULAR; INTRAVENOUS 3 TIMES DAILY
Status: DISCONTINUED | OUTPATIENT
Start: 2024-02-19 | End: 2024-02-18

## 2024-02-18 RX ORDER — FOLIC ACID 1 MG/1
1 TABLET ORAL DAILY
Status: DISCONTINUED | OUTPATIENT
Start: 2024-02-21 | End: 2024-02-18

## 2024-02-18 RX ORDER — LISINOPRIL 10 MG/1
10 TABLET ORAL DAILY
Status: DISCONTINUED | OUTPATIENT
Start: 2024-02-19 | End: 2024-02-25 | Stop reason: HOSPADM

## 2024-02-18 RX ORDER — POTASSIUM CHLORIDE 750 MG/1
10 TABLET, EXTENDED RELEASE ORAL 2 TIMES DAILY
Status: DISCONTINUED | OUTPATIENT
Start: 2024-02-18 | End: 2024-02-25 | Stop reason: HOSPADM

## 2024-02-18 RX ORDER — LORAZEPAM 1 MG/1
1-2 TABLET ORAL EVERY 30 MIN PRN
Status: DISCONTINUED | OUTPATIENT
Start: 2024-02-18 | End: 2024-02-18

## 2024-02-18 RX ORDER — ALLOPURINOL 100 MG/1
100 TABLET ORAL 2 TIMES DAILY
Status: DISCONTINUED | OUTPATIENT
Start: 2024-02-18 | End: 2024-02-25 | Stop reason: HOSPADM

## 2024-02-18 RX ORDER — FLUMAZENIL 0.1 MG/ML
0.2 INJECTION, SOLUTION INTRAVENOUS
Status: DISCONTINUED | OUTPATIENT
Start: 2024-02-18 | End: 2024-02-18

## 2024-02-18 RX ORDER — AMPICILLIN AND SULBACTAM 2; 1 G/1; G/1
3 INJECTION, POWDER, FOR SOLUTION INTRAMUSCULAR; INTRAVENOUS EVERY 6 HOURS
Status: DISCONTINUED | OUTPATIENT
Start: 2024-02-19 | End: 2024-02-24

## 2024-02-18 RX ORDER — CALCIUM CARBONATE 500 MG/1
1000 TABLET, CHEWABLE ORAL 4 TIMES DAILY PRN
Status: DISCONTINUED | OUTPATIENT
Start: 2024-02-18 | End: 2024-02-25 | Stop reason: HOSPADM

## 2024-02-18 RX ORDER — CLONIDINE HYDROCHLORIDE 0.1 MG/1
0.1 TABLET ORAL EVERY 8 HOURS
Status: DISCONTINUED | OUTPATIENT
Start: 2024-02-18 | End: 2024-02-25 | Stop reason: HOSPADM

## 2024-02-18 RX ADMIN — MAGNESIUM SULFATE HEPTAHYDRATE 1 G: 1 INJECTION, SOLUTION INTRAVENOUS at 20:10

## 2024-02-18 RX ADMIN — PANTOPRAZOLE SODIUM 40 MG: 40 TABLET, DELAYED RELEASE ORAL at 23:41

## 2024-02-18 RX ADMIN — IPRATROPIUM BROMIDE AND ALBUTEROL SULFATE 3 ML: .5; 3 SOLUTION RESPIRATORY (INHALATION) at 19:23

## 2024-02-18 RX ADMIN — FOLIC ACID 1 MG: 1 TABLET ORAL at 20:37

## 2024-02-18 RX ADMIN — FUROSEMIDE 80 MG: 10 INJECTION, SOLUTION INTRAMUSCULAR; INTRAVENOUS at 19:13

## 2024-02-18 RX ADMIN — NYSTATIN: 100000 POWDER TOPICAL at 23:48

## 2024-02-18 RX ADMIN — CLONIDINE HYDROCHLORIDE 0.1 MG: 0.1 TABLET ORAL at 20:07

## 2024-02-18 RX ADMIN — AMPICILLIN SODIUM AND SULBACTAM SODIUM 3 G: 2; 1 INJECTION, POWDER, FOR SOLUTION INTRAMUSCULAR; INTRAVENOUS at 21:21

## 2024-02-18 RX ADMIN — PREGABALIN 50 MG: 25 CAPSULE ORAL at 23:40

## 2024-02-18 RX ADMIN — ALLOPURINOL 100 MG: 100 TABLET ORAL at 23:41

## 2024-02-18 RX ADMIN — OXYCODONE HYDROCHLORIDE 5 MG: 5 TABLET ORAL at 23:41

## 2024-02-18 RX ADMIN — MULTIPLE VITAMINS W/ MINERALS TAB 1 TABLET: TAB at 20:07

## 2024-02-18 RX ADMIN — POTASSIUM CHLORIDE 10 MEQ: 750 TABLET, EXTENDED RELEASE ORAL at 23:41

## 2024-02-18 RX ADMIN — GUAIFENESIN 600 MG: 600 TABLET, EXTENDED RELEASE ORAL at 23:40

## 2024-02-18 ASSESSMENT — ACTIVITIES OF DAILY LIVING (ADL)
ADLS_ACUITY_SCORE: 33
ADLS_ACUITY_SCORE: 38
ADLS_ACUITY_SCORE: 38

## 2024-02-19 PROBLEM — F10.920 ALCOHOLIC INTOXICATION WITHOUT COMPLICATION (H): Status: ACTIVE | Noted: 2024-02-19

## 2024-02-19 PROBLEM — F10.939 ALCOHOL WITHDRAWAL (H): Status: ACTIVE | Noted: 2019-04-05

## 2024-02-19 LAB
ALBUMIN SERPL BCG-MCNC: 3.9 G/DL (ref 3.5–5.2)
ALP SERPL-CCNC: 93 U/L (ref 40–150)
ALT SERPL W P-5'-P-CCNC: 25 U/L (ref 0–50)
ANION GAP SERPL CALCULATED.3IONS-SCNC: 8 MMOL/L (ref 7–15)
AST SERPL W P-5'-P-CCNC: 32 U/L (ref 0–45)
ATRIAL RATE - MUSE: 94 BPM
BASOPHILS # BLD AUTO: 0 10E3/UL (ref 0–0.2)
BASOPHILS NFR BLD AUTO: 1 %
BILIRUB SERPL-MCNC: 0.3 MG/DL
BUN SERPL-MCNC: 27.1 MG/DL (ref 8–23)
CALCIUM SERPL-MCNC: 9.6 MG/DL (ref 8.8–10.2)
CHLORIDE SERPL-SCNC: 95 MMOL/L (ref 98–107)
CREAT SERPL-MCNC: 0.72 MG/DL (ref 0.51–0.95)
CREAT SERPL-MCNC: 0.74 MG/DL (ref 0.51–0.95)
DEPRECATED HCO3 PLAS-SCNC: 39 MMOL/L (ref 22–29)
DIASTOLIC BLOOD PRESSURE - MUSE: NORMAL MMHG
EGFRCR SERPLBLD CKD-EPI 2021: 84 ML/MIN/1.73M2
EGFRCR SERPLBLD CKD-EPI 2021: 87 ML/MIN/1.73M2
EOSINOPHIL # BLD AUTO: 0.1 10E3/UL (ref 0–0.7)
EOSINOPHIL NFR BLD AUTO: 1 %
ERYTHROCYTE [DISTWIDTH] IN BLOOD BY AUTOMATED COUNT: 17.5 % (ref 10–15)
FERRITIN SERPL-MCNC: 48 NG/ML (ref 11–328)
GLUCOSE SERPL-MCNC: 101 MG/DL (ref 70–99)
HCT VFR BLD AUTO: 25.7 % (ref 35–47)
HGB BLD-MCNC: 7.7 G/DL (ref 11.7–15.7)
IMM GRANULOCYTES # BLD: 0 10E3/UL
IMM GRANULOCYTES NFR BLD: 1 %
INTERPRETATION ECG - MUSE: NORMAL
IRON BINDING CAPACITY (ROCHE): 382 UG/DL (ref 240–430)
IRON SATN MFR SERPL: 9 % (ref 15–46)
IRON SERPL-MCNC: 35 UG/DL (ref 37–145)
LYMPHOCYTES # BLD AUTO: 1.3 10E3/UL (ref 0.8–5.3)
LYMPHOCYTES NFR BLD AUTO: 22 %
MAGNESIUM SERPL-MCNC: 1.6 MG/DL (ref 1.7–2.3)
MAGNESIUM SERPL-MCNC: 1.7 MG/DL (ref 1.7–2.3)
MCH RBC QN AUTO: 29.5 PG (ref 26.5–33)
MCHC RBC AUTO-ENTMCNC: 30 G/DL (ref 31.5–36.5)
MCV RBC AUTO: 99 FL (ref 78–100)
MONOCYTES # BLD AUTO: 0.5 10E3/UL (ref 0–1.3)
MONOCYTES NFR BLD AUTO: 9 %
NEUTROPHILS # BLD AUTO: 3.8 10E3/UL (ref 1.6–8.3)
NEUTROPHILS NFR BLD AUTO: 66 %
NRBC # BLD AUTO: 0 10E3/UL
NRBC BLD AUTO-RTO: 0 /100
P AXIS - MUSE: 66 DEGREES
PHOSPHATE SERPL-MCNC: 2.7 MG/DL (ref 2.5–4.5)
PLATELET # BLD AUTO: 114 10E3/UL (ref 150–450)
POTASSIUM SERPL-SCNC: 4 MMOL/L (ref 3.4–5.3)
PR INTERVAL - MUSE: 134 MS
PROT SERPL-MCNC: 6.5 G/DL (ref 6.4–8.3)
QRS DURATION - MUSE: 92 MS
QT - MUSE: 342 MS
QTC - MUSE: 427 MS
R AXIS - MUSE: 30 DEGREES
RBC # BLD AUTO: 2.61 10E6/UL (ref 3.8–5.2)
SODIUM SERPL-SCNC: 142 MMOL/L (ref 135–145)
SYSTOLIC BLOOD PRESSURE - MUSE: NORMAL MMHG
T AXIS - MUSE: 50 DEGREES
VENTRICULAR RATE- MUSE: 94 BPM
WBC # BLD AUTO: 5.7 10E3/UL (ref 4–11)

## 2024-02-19 PROCEDURE — 85025 COMPLETE CBC W/AUTO DIFF WBC: CPT | Performed by: INTERNAL MEDICINE

## 2024-02-19 PROCEDURE — 83550 IRON BINDING TEST: CPT | Performed by: INTERNAL MEDICINE

## 2024-02-19 PROCEDURE — 82728 ASSAY OF FERRITIN: CPT | Performed by: INTERNAL MEDICINE

## 2024-02-19 PROCEDURE — 120N000001 HC R&B MED SURG/OB

## 2024-02-19 PROCEDURE — 36415 COLL VENOUS BLD VENIPUNCTURE: CPT | Performed by: INTERNAL MEDICINE

## 2024-02-19 PROCEDURE — 250N000013 HC RX MED GY IP 250 OP 250 PS 637: Performed by: INTERNAL MEDICINE

## 2024-02-19 PROCEDURE — 250N000011 HC RX IP 250 OP 636: Performed by: INTERNAL MEDICINE

## 2024-02-19 PROCEDURE — 83735 ASSAY OF MAGNESIUM: CPT | Performed by: INTERNAL MEDICINE

## 2024-02-19 PROCEDURE — 80053 COMPREHEN METABOLIC PANEL: CPT | Performed by: INTERNAL MEDICINE

## 2024-02-19 PROCEDURE — 250N000013 HC RX MED GY IP 250 OP 250 PS 637: Performed by: FAMILY MEDICINE

## 2024-02-19 PROCEDURE — 99233 SBSQ HOSP IP/OBS HIGH 50: CPT | Performed by: INTERNAL MEDICINE

## 2024-02-19 RX ORDER — FERROUS SULFATE 325(65) MG
325 TABLET, DELAYED RELEASE (ENTERIC COATED) ORAL 2 TIMES DAILY WITH MEALS
Status: DISCONTINUED | OUTPATIENT
Start: 2024-02-19 | End: 2024-02-25 | Stop reason: HOSPADM

## 2024-02-19 RX ORDER — ASPIRIN 81 MG/1
81 TABLET ORAL DAILY
COMMUNITY

## 2024-02-19 RX ADMIN — POTASSIUM CHLORIDE 10 MEQ: 750 TABLET, EXTENDED RELEASE ORAL at 09:12

## 2024-02-19 RX ADMIN — FERROUS SULFATE TAB EC 325 MG (65 MG FE EQUIVALENT) 325 MG: 325 (65 FE) TABLET DELAYED RESPONSE at 17:04

## 2024-02-19 RX ADMIN — MULTIPLE VITAMINS W/ MINERALS TAB 1 TABLET: TAB at 09:12

## 2024-02-19 RX ADMIN — NYSTATIN: 100000 POWDER TOPICAL at 22:24

## 2024-02-19 RX ADMIN — PANTOPRAZOLE SODIUM 40 MG: 40 TABLET, DELAYED RELEASE ORAL at 22:24

## 2024-02-19 RX ADMIN — CLONIDINE HYDROCHLORIDE 0.1 MG: 0.1 TABLET ORAL at 13:17

## 2024-02-19 RX ADMIN — LORAZEPAM 1 MG: 2 INJECTION INTRAMUSCULAR; INTRAVENOUS at 00:18

## 2024-02-19 RX ADMIN — GUAIFENESIN 600 MG: 600 TABLET, EXTENDED RELEASE ORAL at 22:24

## 2024-02-19 RX ADMIN — ALLOPURINOL 100 MG: 100 TABLET ORAL at 22:24

## 2024-02-19 RX ADMIN — LORAZEPAM 1 MG: 2 INJECTION INTRAMUSCULAR; INTRAVENOUS at 08:07

## 2024-02-19 RX ADMIN — ASPIRIN 81 MG CHEWABLE TABLET 81 MG: 81 TABLET CHEWABLE at 09:11

## 2024-02-19 RX ADMIN — AMPICILLIN SODIUM AND SULBACTAM SODIUM 3 G: 2; 1 INJECTION, POWDER, FOR SOLUTION INTRAMUSCULAR; INTRAVENOUS at 09:09

## 2024-02-19 RX ADMIN — ALLOPURINOL 100 MG: 100 TABLET ORAL at 09:11

## 2024-02-19 RX ADMIN — LEVOTHYROXINE SODIUM 50 MCG: 0.03 TABLET ORAL at 09:11

## 2024-02-19 RX ADMIN — ENOXAPARIN SODIUM 40 MG: 40 INJECTION SUBCUTANEOUS at 09:09

## 2024-02-19 RX ADMIN — AMPICILLIN SODIUM AND SULBACTAM SODIUM 3 G: 2; 1 INJECTION, POWDER, FOR SOLUTION INTRAMUSCULAR; INTRAVENOUS at 03:22

## 2024-02-19 RX ADMIN — HYDROXYZINE PAMOATE 25 MG: 25 CAPSULE ORAL at 16:16

## 2024-02-19 RX ADMIN — FERROUS SULFATE TAB EC 325 MG (65 MG FE EQUIVALENT) 325 MG: 325 (65 FE) TABLET DELAYED RESPONSE at 11:23

## 2024-02-19 RX ADMIN — FOLIC ACID 1 MG: 1 TABLET ORAL at 09:12

## 2024-02-19 RX ADMIN — THIAMINE HCL TAB 100 MG 100 MG: 100 TAB at 09:12

## 2024-02-19 RX ADMIN — CLONIDINE HYDROCHLORIDE 0.1 MG: 0.1 TABLET ORAL at 03:20

## 2024-02-19 RX ADMIN — PREGABALIN 50 MG: 25 CAPSULE ORAL at 22:24

## 2024-02-19 RX ADMIN — CLONIDINE HYDROCHLORIDE 0.1 MG: 0.1 TABLET ORAL at 22:24

## 2024-02-19 RX ADMIN — PANTOPRAZOLE SODIUM 40 MG: 40 TABLET, DELAYED RELEASE ORAL at 09:11

## 2024-02-19 RX ADMIN — OXYCODONE HYDROCHLORIDE 5 MG: 5 TABLET ORAL at 16:15

## 2024-02-19 RX ADMIN — METOPROLOL SUCCINATE 25 MG: 25 TABLET, EXTENDED RELEASE ORAL at 09:12

## 2024-02-19 RX ADMIN — AMPICILLIN SODIUM AND SULBACTAM SODIUM 3 G: 2; 1 INJECTION, POWDER, FOR SOLUTION INTRAMUSCULAR; INTRAVENOUS at 15:56

## 2024-02-19 RX ADMIN — LISINOPRIL 10 MG: 10 TABLET ORAL at 09:12

## 2024-02-19 RX ADMIN — GUAIFENESIN 600 MG: 600 TABLET, EXTENDED RELEASE ORAL at 09:12

## 2024-02-19 RX ADMIN — AMPICILLIN SODIUM AND SULBACTAM SODIUM 3 G: 2; 1 INJECTION, POWDER, FOR SOLUTION INTRAMUSCULAR; INTRAVENOUS at 22:23

## 2024-02-19 RX ADMIN — NYSTATIN: 100000 POWDER TOPICAL at 09:13

## 2024-02-19 RX ADMIN — PREGABALIN 50 MG: 25 CAPSULE ORAL at 13:15

## 2024-02-19 RX ADMIN — SERTRALINE HYDROCHLORIDE 100 MG: 50 TABLET ORAL at 09:12

## 2024-02-19 RX ADMIN — PREGABALIN 50 MG: 25 CAPSULE ORAL at 05:30

## 2024-02-19 RX ADMIN — ATORVASTATIN CALCIUM 80 MG: 40 TABLET, FILM COATED ORAL at 09:12

## 2024-02-19 RX ADMIN — TRAZODONE HYDROCHLORIDE 50 MG: 50 TABLET ORAL at 00:18

## 2024-02-19 RX ADMIN — POTASSIUM CHLORIDE 10 MEQ: 750 TABLET, EXTENDED RELEASE ORAL at 22:24

## 2024-02-19 ASSESSMENT — ACTIVITIES OF DAILY LIVING (ADL)
ADLS_ACUITY_SCORE: 35
ADLS_ACUITY_SCORE: 33

## 2024-02-19 NOTE — H&P
[facility]    History and Physical - Hospitalist Service       Date of Admission:  2/18/2024    Assessment & Plan      Natalee Montalvo is a 73 year old female admitted on 2/18/2024. She is found to have alcohol intoxication, Anemia, Acute on chronic hypoxic respiratory failure, and RLL Aspiration pneumonia, Hypomagnesemia, in the setting of underlying COPD, Coronary artery disease.    Admit to inpatient  Place on telemetry  Replace Mg, monitor response  Continue Unasyn  Place on CIWA protocol  Supplemental O2, downtitrate to baseline of 3L as tolerated  Mucinex Q12  DuoNebs   Advise EtOH Abstinence  Hold home torsemide given uremia  Monitor fluid status closely       Diet:    DVT Prophylaxis: Enoxaparin (Lovenox) SQ  Pérez Catheter: Not present  Lines: None     Code Status:  Full    Clinically Significant Risk Factors Present on Admission            # Hypomagnesemia: Lowest Mg = 1.5 mg/dL in last 2 days, will replace as needed     # Drug Induced Platelet Defect: home medication list includes an antiplatelet medication   # Hypertension: Home medication list includes antihypertensive(s)                 Disposition Plan      Expected Discharge Date: 02/20/2024                The patient's care was discussed with the Patient.        Payal Sotomayor DO  [facility]  Securely message with the Vocera Web Console (learn more here)  Text page via [facility] Paging/Directory      Visit/Communication Style   Virtual (Video) communication was used to evaluate Natalee.  Natalee consented to the use of video communication: yes  Video START time: , 2/18/2024  Video STOP time: , 2/18/2024   Patient's location: [facility]   Provider's location during the visit: [facility] Tele-medicine site  [place]      ______________________________________________________________________    Chief Complaint   Shortness of breath    History is obtained from the patient    History of  Present Illness   Natalee Montalvo is a 73 year old female who presented to the ED with complaint of shortness of breath.  She usually is on 3L O2 at home for COPD but noticed that she had been having worsening shortness of breath with activity which progressed to shortness of breath at rest.  She does also complain of edema, but this has been chronic and present for the last 3 years.    In the ED, she was noted to be hypoxic, intoxicated, and found to have a RLL infiltrate.  The patient tells me that she has 4 drinks of rum and water daily at home.     Review of Systems    General: negative for fever, chills, sweats, weakness  Eyes: negative for blurred vision, loss of vision  Ear Nose and Throat: negative for pharyngitis, speech or swallowing difficulties  Respiratory:  negative for sputum production, wheezing, BRUNER, pleuritic pain, sob or cough  Cardiology:  negative for chest pain, palpitations, orthopnea, PND, edema, syncope   Gastrointestinal: negative for abdominal pain, nausea, vomiting, diarrhea, constipation, hematemesis, melena or hematochezia  Genitourinary: negative for frequency, urgency, dysuria, hematuria   Neurological: negative for focal weakness, paresthesia    Past Medical History    I have reviewed this patient's medical history and updated it with pertinent information if needed.   Past Medical History:   Diagnosis Date    Alcoholism (H) 4/1/2019    CAD (coronary artery disease) 4/1/2019    COPD (chronic obstructive pulmonary disease) (H) 4/1/2019       Past Surgical History   I have reviewed this patient's surgical history and updated it with pertinent information if needed.  Past Surgical History:   Procedure Laterality Date    CORONARY ANGIOGRAPHY ADULT ORDER         Social History   I have reviewed this patient's social history and updated it with pertinent information if needed.  Social History     Tobacco Use    Smoking status: Every Day     Packs/day: .5     Types: Cigarettes     Smokeless tobacco: Never   Substance Use Topics    Alcohol use: Yes     Comment: daily    Drug use: No     Comment: Drug use: No       Family History   I have reviewed this patient's family history and updated it with pertinent information if needed.  Family History   Problem Relation Age of Onset    Chronic Obstructive Pulmonary Disease Mother     Coronary Artery Disease Father        Prior to Admission Medications   Prior to Admission Medications   Prescriptions Last Dose Informant Patient Reported? Taking?   Multiple Vitamins-Minerals (WOMENS MULTIVITAMIN) TABS  Self Yes No   Sig: Take 1 tablet by mouth daily   allopurinol (ZYLOPRIM) 100 MG tablet  Self Yes No   Sig: Take 100 mg by mouth 2 times daily   aspirin (ASA) 81 MG chewable tablet  Self Yes No   Sig: Take 81 mg by mouth daily   atorvastatin (LIPITOR) 80 MG tablet  Self Yes No   Sig: Take 80 mg by mouth daily    folic acid (FOLVITE) 1 MG tablet  Self No No   Sig: Take 1 tablet (1 mg) by mouth daily   hydrOXYzine brai (VISTARIL) 25 MG capsule  Self Yes No   Sig: Take 25 mg by mouth daily as needed for anxiety   ipratropium - albuterol 0.5 mg/2.5 mg/3 mL (DUONEB) 0.5-2.5 (3) MG/3ML neb solution   No No   Sig: Take 1 vial (3 mLs) by nebulization every 4 hours as needed for shortness of breath or wheezing   levothyroxine (SYNTHROID/LEVOTHROID) 50 MCG tablet  Self Yes No   Sig: Take 50 mcg by mouth daily   lisinopril (ZESTRIL) 10 MG tablet  Self Yes No   Sig: Take 10 mg by mouth daily   metoprolol succinate ER (TOPROL XL) 25 MG 24 hr tablet  Self Yes No   Sig: Take 25 mg by mouth daily   nitroGLYcerin (NITROSTAT) 0.4 MG sublingual tablet  Self Yes No   Sig: Place 0.4 mg under the tongue every 5 minutes as needed for chest pain For chest pain place 1 tablet under the tongue every 5 minutes for 3 doses. If symptoms persist 5 minutes after 1st dose call 911.   ondansetron (ZOFRAN) 4 MG tablet  Self Yes No   Sig: Take 4 mg by mouth every 12 hours as needed for  nausea or vomiting   order for DME  Self No No   Sig: Equipment being ordered: Walker Wheels ()  Treatment Diagnosis: weakness. COPD   pantoprazole (PROTONIX) 40 MG EC tablet  Self Yes No   Sig: Take 40 mg by mouth 2 times daily   potassium chloride ER (KLOR-CON M) 10 MEQ CR tablet  Self Yes No   Sig: Take 10 mEq by mouth 2 times daily   prasugrel (EFFIENT) 10 MG TABS tablet  Self Yes No   Sig: Take 10 mg by mouth daily   predniSONE (DELTASONE) 5 MG tablet  Self Yes No   Sig: Take 10 mg by mouth daily X3 more days, then decrease to 7.5 mg daily until bottle runs out   pregabalin (LYRICA) 50 MG capsule  Self Yes No   Sig: Take 50 mg by mouth 3 times daily   sertraline (ZOLOFT) 100 MG tablet  Self Yes No   Sig: Take 100 mg by mouth daily   thiamine (B-1) 100 MG tablet  Self Yes No   Sig: Take 100 mg by mouth Every Mon, Wed, Fri Morning   torsemide (DEMADEX) 20 MG tablet  Self Yes No   Sig: Take 20 mg by mouth 2 times daily   traZODone (DESYREL) 50 MG tablet  Self Yes No   Sig: Take 50 mg by mouth nightly as needed for sleep      Facility-Administered Medications: None     Allergies   Allergies   Allergen Reactions    Amoxicillin-Pot Clavulanate      Other reaction(s): GI intolerance  Stomach cramps    Bupropion      Other reaction(s): Other  Insomnia and tension    Nicotine Rash     After open heart surgery; back broke out in severe rash; has not retrialed    Oxycodone Nausea    Pseudoephedrine      Other reaction(s): Increased blood pressure, Tachycardia  Advise not to use this medication which was in her allergy product.       Physical Exam   Vital Signs: Temp: 100  F (37.8  C) Temp src: Tympanic BP: 129/46 Pulse: 91   Resp: 16 SpO2: 91 % O2 Device: Nasal cannula Oxygen Delivery: 3 LPM  Weight: 200 lbs 0 oz    Gen:  Well-developed, well-nourished, in no acute distress, lying semi-supine in hospital stretcher  HEENT:  Anicteric sclera, PER, hearing intact to voice  Resp:  No accessory muscle use, breath sounds  with wheezes and rhonchi  Card:  No murmur, normal S1, S2   Abd:  Soft per RN exam, no TTP, non-distended, normoactive bowel sounds are present  Musc:  Normal strength and movement of the major muscle groups. 2+ pititng edema B/L LE per  nursing  Psych:  Good insight, oriented to person, place and time, not anxious, not agitated    Data     Recent Labs   Lab 02/18/24  1901   WBC 5.2   HGB 7.8*   MCV 98   *      POTASSIUM 4.4   CHLORIDE 95*   CO2 36*   BUN 35.0*   CR 0.91   ANIONGAP 8   FELICIANO 9.5   *   ALBUMIN 3.9   PROTTOTAL 6.5   BILITOTAL <0.2   ALKPHOS 91   ALT 27   AST 34         Recent Results (from the past 24 hour(s))   XR Chest Port 1 View    Narrative    PROCEDURE INFORMATION:   Exam: XR Chest   Exam date and time: 2/18/2024 7:14 PM   Age: 73 years old   Clinical indication: Other: SOB, wheeze, weight gain     TECHNIQUE:   Imaging protocol: Radiologic exam of the chest.   Views: 1 view.     COMPARISON:   CT CHEST/ABDOMEN/PELVIS W CONTRAST 1/17/2024 11:27 PM     FINDINGS:   Lungs: Patchy density in the medial right lower lobe.   Pleural spaces: Unremarkable. No pleural effusion. No pneumothorax.   Heart/Mediastinum: Mild cardiomegaly.   Bones/joints: Prior median sternotomy. Prior right AC separation and distal   clavicular resection. Moderate left acromioclavicular joint degenerative   disease. Moderate right glenohumeral joint degenerative spurring. Healed   posterior right 6th, 7th and 8th rib fractures. No acute fracture.       Impression    IMPRESSION:   Density in the medial right lower lobe, concerning for pneumonia. Consider CT   for further evaluation.     THIS DOCUMENT HAS BEEN ELECTRONICALLY SIGNED BY FRANCIS CONCEPCION MD

## 2024-02-19 NOTE — PROGRESS NOTES
Interdisciplinary Discharge Planning Note    Anticipated Discharge Date: 2/22    Anticipated Discharge Location: Home    Clinical Needs Before Discharge:  stable functional status    Treatment Needs After Discharge:  home oxygen    Potential Barriers to Discharge: None identified at this time    OSMEL Correia  2/19/2024,  12:44 PM

## 2024-02-19 NOTE — ED PROVIDER NOTES
History     Chief Complaint   Patient presents with    Cough    Shortness of Breath       Natalee Montalvo is a 73 year old female presenting for dyspnea. Onset a couple weeks ago.  Associated with productive cough and orthopnea and weight gain primarily in her abdomen but also in her legs.  Her abdominal swelling makes breathing more difficult.  Denies any chest pain or pressure or fever.  She does have a productive purulent cough.  History of COPD.  On home oxygen at 3 L.  EMS put her on 4 L and she was 100%.  DuoNeb by EMS did result in some improvement.  She was supposed to change to new diuretic but the new diuretic was too expensive.    Allergies   Allergen Reactions    Amoxicillin-Pot Clavulanate      Other reaction(s): GI intolerance  Stomach cramps    Bupropion      Other reaction(s): Other  Insomnia and tension    Nicotine Rash     After open heart surgery; back broke out in severe rash; has not retrialed    Oxycodone Nausea    Pseudoephedrine      Other reaction(s): Increased blood pressure, Tachycardia  Advise not to use this medication which was in her allergy product.       Patient Active Problem List    Diagnosis Date Noted    GI bleed 01/18/2024     Priority: Medium    Gastrointestinal hemorrhage 01/17/2024     Priority: Medium    Supratherapeutic INR 01/17/2024     Priority: Medium    Thigh laceration, left, subsequent encounter 06/22/2023     Priority: Medium    Low blood pressure reading 06/22/2023     Priority: Medium    Anemia due to blood loss, acute 06/17/2023     Priority: Medium    Elevated troponin 06/17/2023     Priority: Medium    Acute pain of right shoulder 06/17/2023     Priority: Medium    Thigh laceration 06/17/2023     Priority: Medium    Fall 06/17/2023     Priority: Medium    Acute on chronic combined systolic and diastolic congestive heart failure (H) 01/19/2022     Priority: Medium    Abnormal nuclear stress test 06/10/2021     Priority: Medium     Formatting of this note  might be different from the original.  Added automatically from request for surgery [id number]      Alcohol withdrawal (H) 04/05/2019     Priority: Medium    Hypovolemic shock (H) 04/01/2019     Priority: Medium    CAD (coronary artery disease) 04/01/2019     Priority: Medium    COPD (chronic obstructive pulmonary disease) (H) 04/01/2019     Priority: Medium    Alcoholism (H) 04/01/2019     Priority: Medium    Hypokalemia 04/01/2019     Priority: Medium    Hyponatremia 04/01/2019     Priority: Medium    Closed fracture of multiple ribs of right side 04/01/2019     Priority: Medium    Prolonged Q-T interval on ECG 04/01/2019     Priority: Medium    Alcoholic fatty liver 01/19/2019     Priority: Medium    History of non-ST elevation myocardial infarction (NSTEMI) 01/19/2019     Priority: Medium       Past Medical History:   Diagnosis Date    Alcoholism (H) 4/1/2019    CAD (coronary artery disease) 4/1/2019    COPD (chronic obstructive pulmonary disease) (H) 4/1/2019       No past surgical history on file.    No family history on file.    Social History     Tobacco Use    Smoking status: Every Day     Packs/day: .5     Types: Cigarettes    Smokeless tobacco: Never   Substance Use Topics    Alcohol use: Yes     Comment: daily    Drug use: No     Comment: Drug use: No       Medications:    allopurinol (ZYLOPRIM) 100 MG tablet  aspirin (ASA) 81 MG chewable tablet  atorvastatin (LIPITOR) 80 MG tablet  folic acid (FOLVITE) 1 MG tablet  hydrOXYzine bari (VISTARIL) 25 MG capsule  ipratropium - albuterol 0.5 mg/2.5 mg/3 mL (DUONEB) 0.5-2.5 (3) MG/3ML neb solution  levothyroxine (SYNTHROID/LEVOTHROID) 50 MCG tablet  lisinopril (ZESTRIL) 10 MG tablet  metoprolol succinate ER (TOPROL XL) 25 MG 24 hr tablet  Multiple Vitamins-Minerals (WOMENS MULTIVITAMIN) TABS  nitroGLYcerin (NITROSTAT) 0.4 MG sublingual tablet  ondansetron (ZOFRAN) 4 MG tablet  order for DME  pantoprazole (PROTONIX) 40 MG EC tablet  potassium chloride ER (KLOR-CON  M) 10 MEQ CR tablet  prasugrel (EFFIENT) 10 MG TABS tablet  predniSONE (DELTASONE) 5 MG tablet  pregabalin (LYRICA) 50 MG capsule  sertraline (ZOLOFT) 100 MG tablet  thiamine (B-1) 100 MG tablet  torsemide (DEMADEX) 20 MG tablet  traZODone (DESYREL) 50 MG tablet        Review of Systems: See HPI for pertinent negatives and positives. All other systems reviewed and found to be negative.    Physical Exam   Temp 98.6  F (37  C) (Oral)   Resp 22   Wt 90.7 kg (200 lb)   BMI 35.43 kg/m       General: awake, comfortable  HEENT: atraumatic, 3 L nasal cannula   Respiratory: normal effort, diffuse wheeze and poor air movement throughout  Cardiovascular: regular rate and rhythm, no murmurs, extremities appear well perfused  Abdomen: soft, nontender, distended with edema  Extremities: no deformities, tenderness, 1+ bilateral lower extremity edema  Skin: warm, dry, no rashes  Neuro: alert, no focal deficits    ED Course      Results for orders placed or performed during the hospital encounter of 02/18/24 (from the past 24 hour(s))   CBC with platelets differential    Narrative    The following orders were created for panel order CBC with platelets differential.  Procedure                               Abnormality         Status                     ---------                               -----------         ------                     CBC with platelets and d...[[id number]]  Abnormal            Final result                 Please view results for these tests on the individual orders.   CBC with platelets and differential   Result Value Ref Range    WBC Count 5.2 4.0 - 11.0 10e3/uL    RBC Count 2.62 (L) 3.80 - 5.20 10e6/uL    Hemoglobin 7.8 (L) 11.7 - 15.7 g/dL    Hematocrit 25.6 (L) 35.0 - 47.0 %    MCV 98 78 - 100 fL    MCH 29.8 26.5 - 33.0 pg    MCHC 30.5 (L) 31.5 - 36.5 g/dL    RDW 17.5 (H) 10.0 - 15.0 %    Platelet Count 135 (L) 150 - 450 10e3/uL    % Neutrophils 66 %    % Lymphocytes 25 %    % Monocytes 7 %    % Eosinophils  0 %    % Basophils 1 %    % Immature Granulocytes 1 %    NRBCs per 100 WBC 0 <1 /100    Absolute Neutrophils 3.5 1.6 - 8.3 10e3/uL    Absolute Lymphocytes 1.3 0.8 - 5.3 10e3/uL    Absolute Monocytes 0.4 0.0 - 1.3 10e3/uL    Absolute Eosinophils 0.0 0.0 - 0.7 10e3/uL    Absolute Basophils 0.0 0.0 - 0.2 10e3/uL    Absolute Immature Granulocytes 0.0 <=0.4 10e3/uL    Absolute NRBCs 0.0 10e3/uL   Blood gas venous   Result Value Ref Range    pH Venous 7.36 7.32 - 7.43    pCO2 Venous 69 (H) 40 - 50 mm Hg    pO2 Venous 15 (L) 25 - 47 mm Hg    Bicarbonate Venous 39 (H) 21 - 28 mmol/L    Base Excess/Deficit Venous 12.3 (H) -3.0 - 3.0 mmol/L    FIO2 95     Oxyhemoglobin Venous 19 (L) 70 - 75 %    O2 Sat, Venous 20.3 (L) 70.0 - 75.0 %    Narrative    In healthy individuals, oxyhemoglobin (O2Hb) and oxygen saturation (SO2) are approximately equal. In the presence of dyshemoglobins, oxyhemoglobin can be considerably lower than oxygen saturation.   Extra Tube ([place] Draw)    Narrative    The following orders were created for panel order Extra Tube ([place] Draw).  Procedure                               Abnormality         Status                     ---------                               -----------         ------                     Extra Blue Top Tube[[id number]]                              Final result               Extra Red Top Tube[[id number]]                               Final result               Extra Green Top (Lithium...[[id number]]                      Final result                 Please view results for these tests on the individual orders.   Extra Blue Top Tube   Result Value Ref Range    Hold Specimen x    Extra Red Top Tube   Result Value Ref Range    Hold Specimen x    Extra Green Top (Lithium Heparin) Tube   Result Value Ref Range    Hold Specimen x        Medications   ipratropium - albuterol 0.5 mg/2.5 mg/3 mL (DUONEB) neb solution 3 mL (has no administration in time range)   furosemide (LASIX) injection 80  mg (80 mg Intravenous $Given 2/18/24 1913)       Assessments & Plan (with Medical Decision Making)     I have reviewed the nursing notes.         73 year old female evaluated for dyspnea with weight gain and productive cough.  History includes CHF and COPD.  Stable oxygen use at 3 L.  Diffuse wheeze with poor air movement throughout.  Edematous abdomen and legs consistent with mild anasarca.  Point-of-care ultrasound demonstrating many B-lines per field bilaterally.  Diagnosis seems most consistent with combination of CHF and COPD.  Given high-dose Lasix and DuoNeb ordered.  Deferring steroids and antibiotics to Dr. Chamberlain at [facility].         New Prescriptions    No medications on file       Final diagnoses:   Dyspnea, unspecified type   Acute on chronic congestive heart failure, unspecified heart failure type (H)       2/18/2024   [facility]       Wesly Emanuel MD  02/18/24 1908       Wesly Emanuel MD  02/18/24 1922

## 2024-02-19 NOTE — PROVIDER NOTIFICATION
02/18/24 2200   Initial Information   Patient Belongings remains with patient   Patient Belongings Remaining with Patient cell phone/electronics;clothing;jewelry   Did you bring any home meds/supplements to the hospital?  No     A               Admission:  I am responsible for any personal items that are not sent to the safe or pharmacy.  [facility] is not responsible for loss, theft or damage of any property in my possession.    Signature:  _________________________________ Date: _______  Time: _____                                              Staff Signature:  ____________________________ Date: ________  Time: _____      2nd Staff person, if patient is unable/unwilling to sign:    Signature: ________________________________ Date: ________  Time: _____     Discharge:  [facility] has returned all of my personal belongings:    Signature: _________________________________ Date: ________  Time: _____                                          Staff Signature:  ____________________________ Date: ________  Time: _____

## 2024-02-19 NOTE — PROGRESS NOTES
Patient received ativan at 0018 for CIWA of 8, primarily anxiety related symptoms and a headache. Patient is now calm, no longer anxious. Headache is gone, patient is diaphoretic, CIWA score a 4 at this time.

## 2024-02-19 NOTE — PHARMACY-ADMISSION MEDICATION HISTORY
Pharmacist Admission Medication History    Admission medication history is complete. The information provided in this note is only as accurate as the sources available at the time of the update.    Information Source(s): Patient, Hospital records, [facility]/[employer], and [facility] Note  via in-person    Pertinent Information: Patient is a poor historian and primary takes her medication by following the direction given on her medication bottle. Patient was not able to recognize any medication names, but was able to tell that she last took her medication on the morning of 02/19. Patient did have a office visit with her cardiologist on 02/08/24. Patient's medication list did match both the medication list her cardiology visit and her list from discharge. Note: per [employer], patient was prescribed Entresto, but per Cardiology note patient is was not able to afford her new RX and will continue taking lisinopril for the time being.    Changes made to PTA medication list:  Added: None  Deleted: None  Changed: None    Allergies reviewed with patient and updates made in EHR: yes    Medication History Completed By: Robert Moore RPH 2/19/2024 1:40 PM    Prior to Admission medications    Medication Sig Last Dose Taking? Auth Provider Long Term End Date   allopurinol (ZYLOPRIM) 100 MG tablet Take 100 mg by mouth 2 times daily 2/18/2024 at AM Yes Unknown, Entered By History     aspirin 81 MG EC tablet Take 81 mg by mouth daily 2/18/2024 at AM Yes Unknown, Entered By History     atorvastatin (LIPITOR) 80 MG tablet Take 80 mg by mouth daily  2/18/2024 at AM Yes Reported, Patient Yes    folic acid (FOLVITE) 1 MG tablet Take 1 tablet (1 mg) by mouth daily 2/18/2024 at AM Yes Marivel Beavers DO     hydrOXYzine bari (VISTARIL) 25 MG capsule Take 25 mg by mouth daily as needed for anxiety 2/18/2024 at AM Yes Unknown, Entered By History     ipratropium - albuterol 0.5 mg/2.5 mg/3 mL (DUONEB) 0.5-2.5 (3) MG/3ML  neb solution Take 1 vial (3 mLs) by nebulization every 4 hours as needed for shortness of breath or wheezing Past Week Yes John Saldivra MD Yes    levothyroxine (SYNTHROID/LEVOTHROID) 50 MCG tablet Take 50 mcg by mouth daily 2/18/2024 at AM Yes Unknown, Entered By History Yes    lisinopril (ZESTRIL) 10 MG tablet Take 10 mg by mouth daily 2/18/2024 at AM Yes Unknown, Entered By History Yes    metoprolol succinate ER (TOPROL XL) 25 MG 24 hr tablet Take 25 mg by mouth daily 2/18/2024 at AM Yes Unknown, Entered By History Yes    Multiple Vitamins-Minerals (WOMENS MULTIVITAMIN) TABS Take 1 tablet by mouth daily 2/18/2024 at AM Yes Unknown, Entered By History     nitroGLYcerin (NITROSTAT) 0.4 MG sublingual tablet Place 0.4 mg under the tongue every 5 minutes as needed for chest pain For chest pain place 1 tablet under the tongue every 5 minutes for 3 doses. If symptoms persist 5 minutes after 1st dose call 911. Past Month Yes Unknown, Entered By History Yes    pantoprazole (PROTONIX) 40 MG EC tablet Take 40 mg by mouth 2 times daily 2/18/2024 at AM Yes Unknown, Entered By History     potassium chloride ER (KLOR-CON M) 10 MEQ CR tablet Take 10 mEq by mouth 2 times daily 2/18/2024 at AM Yes Unknown, Entered By History     prasugrel (EFFIENT) 10 MG TABS tablet Take 10 mg by mouth daily 2/18/2024 at AM Yes Unknown, Entered By History     predniSONE (DELTASONE) 5 MG tablet Take 7.5 mg by mouth daily X3 more days, then decrease to 7.5 mg daily until bottle runs out (taking 7.5 mg right now) 2/18/2024 at AM Yes Unknown, Entered By History     pregabalin (LYRICA) 50 MG capsule Take 50 mg by mouth 3 times daily 2/18/2024 at AM Yes Unknown, Entered By History     sertraline (ZOLOFT) 100 MG tablet Take 100 mg by mouth daily 2/18/2024 at AM Yes Unknown, Entered By History     thiamine (B-1) 100 MG tablet Take 100 mg by mouth Every Mon, Wed, Fri Morning 2/16/2024 at AM Yes Unknown, Entered By History No    torsemide (DEMADEX) 20  MG tablet Take 20 mg by mouth 2 times daily 2/18/2024 at AM Yes Unknown, Entered By History     ondansetron (ZOFRAN) 4 MG tablet Take 4 mg by mouth every 12 hours as needed for nausea or vomiting 2/17/2024 at AM  Unknown, Entered By History     order for DME Equipment being ordered: Walker Wheels ()  Treatment Diagnosis: weakness. COPD   Marivel Beavers,      traZODone (DESYREL) 50 MG tablet Take 50 mg by mouth nightly as needed for sleep 2/17/2024 at PM  Unknown, Entered By History

## 2024-02-19 NOTE — ED PROVIDER NOTES
02/18/24   7:00 PM    I am accepting the care of this patient from Dr Emanuel at change of shift.  Natalee Montalvo is a 72 yo female with history of heart disease including s/dCHF, CAD, COPD, alcohol abuse and withdrawal, history of GI bleed (not on blood thinners) here with SOB and edema.     I spoke with her son and daughter in law: Natalee drinks heavily every day. She lives alone although her sister stops by occasionally. She has a history of CHF and takes her Lasix as needed.  She has a history of alcohol withdrawal.     EKG NSR, rate 94, normal axis    ECHO from 2019 shows EF 45-50%.    Labs show CBC with hgb 7.8 (stable), platelets 135,000, CMP with BUN 35, ethanol 0.12, Mg 1.5, phosphorus 3.5, BNP 1485, lactic acid 1.8, VBG with pCO2 69, pO2 15, bicarb 39, stool blood negative, 4 Plex negative.    Chest xray implies RLL pneumonia.     ED Course    I have ordered a CIWA scale.  We will treat for pneumonia with Unasyn given her alcohol abuse and high risk for aspiration. I will try to bring her into the hospital.     9:28 PM  I spoke with Dr Sotomayor and we will admit Natalee to the floor. I spoke with her son Allan to let him know.  He asked to please have Social Work contact him prior to discharge as he does not feel she can go home due to her alcohol use.    Diagnosis    (I50.9) Acute on chronic congestive heart failure, unspecified heart failure type (H)- given a dose of Lasix in the ED    (F10.10) Alcohol abuse- current BAL 0.12 in ED    (J44.1) Chronic obstructive pulmonary disease with acute exacerbation (H)- given a dose of Duo Neb in ED    (J69.0) Aspiration pneumonia of right lower lobe, unspecified aspiration pneumonia type (H)- started on Unasyn in the ED      Plan: admission           Patrick Chamberlain MD  02/18/24 2132       Patrick Chamberlain MD  02/18/24 2137

## 2024-02-19 NOTE — PROGRESS NOTES
[facility]    Medicine Progress Note - Hospitalist Service    Date of Admission:  2/18/2024    Assessment & Plan   Principal Problem:      Acute respiratory failure with hypoxia    Assessment: present on admission, secondary to aspiration pneumonia and COPD. Requiring supplemental oxygen to maintain sats over 90%    Plan: treat pneumonia, wean oxygen as needed      Aspiration pneumonitis (H)    Assessment: present on admission, secondary to alcohol intoxication.     Plan: Unasyn day 1      Alcoholic intoxication without complication (H24)    Assessment: present on admission. BAL 0.12 on admit. Will likely have withdrawal symptoms.     Plan: monitor, CIWA    Active Problems:    CAD (coronary artery disease)    Assessment: present on admission         Alcohol withdrawal (H)    Assessment: alcohol dependence    Plan: [facility] protocol      Alcohol abuse      Chronic obstructive pulmonary disease with acute exacerbation (H)    Assessment: present on admission     Plan: monitor      Hypomagnesemia    Assessment: present on admission     Plan: replace      Acute on chronic anemia  Assessment: typically runs around 8.0 hemoglobin. Presumed blood loss related as well as alcohol related pancytopenia.   Plan: check iron studies, continue PPI and hold aspirin             Diet: Fluid restriction 1500 ML FLUID  Combination Diet Regular Diet Adult, Regular Diet; 2 gm NA Diet    DVT Prophylaxis: Pneumatic Compression Devices  Pérez Catheter: Not present  Lines: None     Cardiac Monitoring: ACTIVE order. Indication: Acute decompensated heart failure (48 hours)  Code Status: Full Code      Clinically Significant Risk Factors Present on Admission            # Hypomagnesemia: Lowest Mg = 1.5 mg/dL in last 2 days, will replace as needed     # Drug Induced Platelet Defect: home medication list includes an antiplatelet medication   # Hypertension: Home medication list includes antihypertensive(s)                  Disposition Plan      Expected Discharge Date: 02/20/2024                    John Saldivar MD  Hospitalist Service  [facility]  Securely message with [employer] (more info)  Text page via [facility] Paging/Directory   ______________________________________________________________________    Interval History   Feels weak, dyspnea. Complains of neck pain.     Physical Exam   Vital Signs: Temp: 99.9  F (37.7  C) Temp src: Tympanic BP: 116/52 Pulse: 99   Resp: 16 SpO2: 93 % O2 Device: Nasal cannula Oxygen Delivery: 4 LPM  Weight: 191 lbs 1.6 oz    GENERAL: Comfortable, talkative, in no apparent distress.  HEENT: Anicteric, non-injected sclera, mouth moist.   NECK: No JVD.  CARDIOVASCULAR: regular rate and rhythm, 2/6 murmur. No lower extremity edema   RESPIRATORY: Clear to auscultation bilaterally, no wheezes, no crackles.  GI: Non-distended, normal bowel sounds, soft, non-tender.  SKIN: No rashes, sores.   NEUROLOGY: Alert and oriented x3, follows commands, speech and language normal.       Medical Decision Making       55 MINUTES SPENT BY ME on the date of service doing chart review, history, exam, documentation & further activities per the note.      Data     I have personally reviewed the following data over the past 24 hrs:    5.7  \   7.7 (L)   / 114 (L)     142 95 (L) 27.1 (H) /  101 (H)   4.0 39 (H) 0.72 \     ALT: 25 AST: 32 AP: 93 TBILI: 0.3   ALB: 3.9 TOT PROTEIN: 6.5 LIPASE: N/A     Trop: N/A BNP: 1,485 (H)     Procal: N/A CRP: N/A Lactic Acid: 1.8         Imaging results reviewed over the past 24 hrs:   Recent Results (from the past 24 hour(s))   XR Chest Port 1 View    Narrative    PROCEDURE INFORMATION:   Exam: XR Chest   Exam date and time: 2/18/2024 7:14 PM   Age: 73 years old   Clinical indication: Other: SOB, wheeze, weight gain     TECHNIQUE:   Imaging protocol: Radiologic exam of the chest.   Views: 1 view.     COMPARISON:   CT CHEST/ABDOMEN/PELVIS W CONTRAST 1/17/2024 11:27 PM      FINDINGS:   Lungs: Patchy density in the medial right lower lobe.   Pleural spaces: Unremarkable. No pleural effusion. No pneumothorax.   Heart/Mediastinum: Mild cardiomegaly.   Bones/joints: Prior median sternotomy. Prior right AC separation and distal   clavicular resection. Moderate left acromioclavicular joint degenerative   disease. Moderate right glenohumeral joint degenerative spurring. Healed   posterior right 6th, 7th and 8th rib fractures. No acute fracture.       Impression    IMPRESSION:   Density in the medial right lower lobe, concerning for pneumonia. Consider CT   for further evaluation.     THIS DOCUMENT HAS BEEN ELECTRONICALLY SIGNED BY FRANCIS CONCEPCION MD

## 2024-02-19 NOTE — PLAN OF CARE
Goal Outcome Evaluation:  Patient is alert and oriented x4 and pleasant. VSS. /46 (BP Location: Right arm, Patient Position: Semi-Fontenot's, Cuff Size: Adult Regular)   Pulse 91   Temp 100  F (37.8  C) (Tympanic)   Resp 16   Wt 90.7 kg (200 lb)   SpO2 91%   BMI 35.43 kg/m   Patient is chronically on 3.5 L O2 at home. Patient is on 3L now, O2 satting in the 90's. Patient complains of shortness of breath. Patient does not appear to be short of breath, does not have increased respirations or work of breathing, no abdominal muscle use. Patient has a productive cough. Patient is anxious, cannot get comfortable. PRN ativan utilized per CIWA scoring. Patient is on a 1500ml fluid restriction. Before midnight patient drank a 480ml cup of water. Complaining of being thirsty/dry. Chap stick provided, educated on fluid restriction. Patient has an external catheter in place and is putting out clear yellow urine. Patients legs are edematous, 1 to 2+, skin is tight. Hard to assess as patient yelled out in pain when writer touched legs. Abdomen is distended. Last bowel movement was 2/18. Patient has a scab to her right knee, scattered bruising to bilateral arms, bruising to second toe on right foot, bruising to right chest and breast, redness to the back of her neck, and the right side of her groin is red and yeasty. Nystatin applied. Patient reports her last drink was 2/18 around 1500.

## 2024-02-19 NOTE — PROGRESS NOTES
[facility] ADMISSION NOTE    Patient admitted to room 331 at approximately 2200 via bed from emergency room. Patient was accompanied by staff     Verbal SBAR report received from DEVIN CAM prior to patient arrival.     Patient trasferred to bed via slider sheet. Patient alert and oriented X 3. Pain is not well controlled. Will have PRN's available. Admission vital signs: Blood pressure 129/46, pulse 91, temperature 100  F (37.8  C), temperature source Tympanic, resp. rate 16, weight 90.7 kg (200 lb), SpO2 91%, not currently breastfeeding. Patient was oriented to plan of care, call light, bed controls, tv, telephone, bathroom, and visiting hours.     Risk Assessment    The following safety risks were identified during admission: fall, skin, and aspiration. Yellow risk band applied: YES.     Skin Initial Assessment    This writer admitted this patient and completed a full skin assessment and Jagdish score in the Adult PCS flowsheet. Appropriate interventions initiated as needed.         Jagdish Risk Assessment  Sensory Perception: 4-->no impairment  Moisture: 3-->occasionally moist  Activity: 3-->walks occasionally  Mobility: 3-->slightly limited  Nutrition: 3-->adequate  Friction and Shear: 3-->no apparent problem  Jagdish Score: 19  Moisture Interventions: Urinary collection device, Encourage regular toileting, Wicking textile in skin fold (InterDry)  Mattress: Standard gel/foam mattress (IsoFlex, Atmos Air, etc.)  Bed Frame: Standard width and length    Education    Patient has a [place] to Observation order: No  Observation education completed and documented: No      Ethel Almeida RN

## 2024-02-19 NOTE — ED TRIAGE NOTES
Pt has had increased SOB with increased abdominal and lower extremity edema.  Increased cough, congested with some production.

## 2024-02-19 NOTE — PLAN OF CARE
Goal Outcome Evaluation:             Patient has been anxious this shift, stating she can't breath. LS expiratory wheezing and fine crackles. CIWA scores have been ranging 6-11, ativan given x1. 3LPM via nasal cannula. Up to commode with SBA, was up in recliner for a short time. PRN oxycodone given for right hip pain.

## 2024-02-19 NOTE — ED TRIAGE NOTES
Increased s.o.b.;  increased oxygen to 4LNC from 3LNC sats were 100% called EMS    Duoneb tx given enroute via EMS.

## 2024-02-20 LAB
ALLEN'S TEST: NO
ANION GAP SERPL CALCULATED.3IONS-SCNC: 8 MMOL/L (ref 7–15)
BASE EXCESS BLDA CALC-SCNC: 12.8 MMOL/L (ref -3–3)
BUN SERPL-MCNC: 13.6 MG/DL (ref 8–23)
CALCIUM SERPL-MCNC: 9.7 MG/DL (ref 8.8–10.2)
CHLORIDE SERPL-SCNC: 98 MMOL/L (ref 98–107)
COHGB MFR BLD: 95.3 % (ref 95–96)
CREAT SERPL-MCNC: 0.56 MG/DL (ref 0.51–0.95)
DEPRECATED HCO3 PLAS-SCNC: 36 MMOL/L (ref 22–29)
EGFRCR SERPLBLD CKD-EPI 2021: >90 ML/MIN/1.73M2
ERYTHROCYTE [DISTWIDTH] IN BLOOD BY AUTOMATED COUNT: 17.2 % (ref 10–15)
GLUCOSE SERPL-MCNC: 101 MG/DL (ref 70–99)
HCO3 BLD-SCNC: 39 MMOL/L (ref 21–28)
HCT VFR BLD AUTO: 24.9 % (ref 35–47)
HGB BLD-MCNC: 7.5 G/DL (ref 11.7–15.7)
HOLD SPECIMEN: NORMAL
HOLD SPECIMEN: NORMAL
MAGNESIUM SERPL-MCNC: 1.8 MG/DL (ref 1.7–2.3)
MCH RBC QN AUTO: 29.4 PG (ref 26.5–33)
MCHC RBC AUTO-ENTMCNC: 30.1 G/DL (ref 31.5–36.5)
MCV RBC AUTO: 98 FL (ref 78–100)
O2/TOTAL GAS SETTING VFR VENT: 32 %
PCO2 BLD: 65 MM HG (ref 35–45)
PH BLD: 7.4 [PH] (ref 7.35–7.45)
PLATELET # BLD AUTO: 104 10E3/UL (ref 150–450)
PO2 BLD: 70 MM HG (ref 80–105)
POTASSIUM SERPL-SCNC: 4.1 MMOL/L (ref 3.4–5.3)
RBC # BLD AUTO: 2.55 10E6/UL (ref 3.8–5.2)
SAO2 % BLDA: 93 % (ref 92–100)
SODIUM SERPL-SCNC: 142 MMOL/L (ref 135–145)
WBC # BLD AUTO: 3.5 10E3/UL (ref 4–11)

## 2024-02-20 PROCEDURE — 36415 COLL VENOUS BLD VENIPUNCTURE: CPT | Performed by: INTERNAL MEDICINE

## 2024-02-20 PROCEDURE — 80048 BASIC METABOLIC PNL TOTAL CA: CPT | Performed by: INTERNAL MEDICINE

## 2024-02-20 PROCEDURE — 82805 BLOOD GASES W/O2 SATURATION: CPT | Performed by: INTERNAL MEDICINE

## 2024-02-20 PROCEDURE — 83735 ASSAY OF MAGNESIUM: CPT | Performed by: INTERNAL MEDICINE

## 2024-02-20 PROCEDURE — 250N000013 HC RX MED GY IP 250 OP 250 PS 637: Performed by: INTERNAL MEDICINE

## 2024-02-20 PROCEDURE — 85027 COMPLETE CBC AUTOMATED: CPT | Performed by: INTERNAL MEDICINE

## 2024-02-20 PROCEDURE — 200N000001 HC R&B ICU

## 2024-02-20 PROCEDURE — 36600 WITHDRAWAL OF ARTERIAL BLOOD: CPT | Performed by: INTERNAL MEDICINE

## 2024-02-20 PROCEDURE — 99232 SBSQ HOSP IP/OBS MODERATE 35: CPT | Performed by: INTERNAL MEDICINE

## 2024-02-20 PROCEDURE — 250N000013 HC RX MED GY IP 250 OP 250 PS 637: Performed by: FAMILY MEDICINE

## 2024-02-20 PROCEDURE — 999N000157 HC STATISTIC RCP TIME EA 10 MIN

## 2024-02-20 PROCEDURE — 250N000009 HC RX 250: Performed by: INTERNAL MEDICINE

## 2024-02-20 PROCEDURE — 250N000011 HC RX IP 250 OP 636: Performed by: INTERNAL MEDICINE

## 2024-02-20 RX ORDER — DEXMEDETOMIDINE HYDROCHLORIDE 4 UG/ML
.1-1.2 INJECTION, SOLUTION INTRAVENOUS CONTINUOUS
Status: DISCONTINUED | OUTPATIENT
Start: 2024-02-20 | End: 2024-02-23

## 2024-02-20 RX ADMIN — MULTIPLE VITAMINS W/ MINERALS TAB 1 TABLET: TAB at 10:51

## 2024-02-20 RX ADMIN — PANTOPRAZOLE SODIUM 40 MG: 40 TABLET, DELAYED RELEASE ORAL at 10:52

## 2024-02-20 RX ADMIN — LISINOPRIL 10 MG: 10 TABLET ORAL at 10:52

## 2024-02-20 RX ADMIN — TRAZODONE HYDROCHLORIDE 50 MG: 50 TABLET ORAL at 19:55

## 2024-02-20 RX ADMIN — AMPICILLIN SODIUM AND SULBACTAM SODIUM 3 G: 2; 1 INJECTION, POWDER, FOR SOLUTION INTRAMUSCULAR; INTRAVENOUS at 16:10

## 2024-02-20 RX ADMIN — POTASSIUM CHLORIDE 10 MEQ: 750 TABLET, EXTENDED RELEASE ORAL at 10:51

## 2024-02-20 RX ADMIN — FOLIC ACID 1 MG: 1 TABLET ORAL at 10:51

## 2024-02-20 RX ADMIN — DEXMEDETOMIDINE HYDROCHLORIDE 0.2 MCG/KG/HR: 4 INJECTION, SOLUTION INTRAVENOUS at 23:41

## 2024-02-20 RX ADMIN — ONDANSETRON 4 MG: 4 TABLET, ORALLY DISINTEGRATING ORAL at 14:49

## 2024-02-20 RX ADMIN — OXYCODONE HYDROCHLORIDE 5 MG: 5 TABLET ORAL at 08:40

## 2024-02-20 RX ADMIN — LORAZEPAM 2 MG: 2 INJECTION INTRAMUSCULAR; INTRAVENOUS at 20:41

## 2024-02-20 RX ADMIN — ATORVASTATIN CALCIUM 80 MG: 40 TABLET, FILM COATED ORAL at 10:52

## 2024-02-20 RX ADMIN — ALLOPURINOL 100 MG: 100 TABLET ORAL at 10:53

## 2024-02-20 RX ADMIN — NICOTINE POLACRILEX 2 MG: 2 GUM, CHEWING BUCCAL at 19:55

## 2024-02-20 RX ADMIN — AMPICILLIN SODIUM AND SULBACTAM SODIUM 3 G: 2; 1 INJECTION, POWDER, FOR SOLUTION INTRAMUSCULAR; INTRAVENOUS at 10:40

## 2024-02-20 RX ADMIN — CLONIDINE HYDROCHLORIDE 0.1 MG: 0.1 TABLET ORAL at 05:28

## 2024-02-20 RX ADMIN — SERTRALINE HYDROCHLORIDE 100 MG: 50 TABLET ORAL at 10:53

## 2024-02-20 RX ADMIN — CLONIDINE HYDROCHLORIDE 0.1 MG: 0.1 TABLET ORAL at 13:43

## 2024-02-20 RX ADMIN — LORAZEPAM 2 MG: 1 TABLET ORAL at 08:40

## 2024-02-20 RX ADMIN — NICOTINE POLACRILEX 2 MG: 2 GUM, CHEWING BUCCAL at 02:39

## 2024-02-20 RX ADMIN — AMPICILLIN SODIUM AND SULBACTAM SODIUM 3 G: 2; 1 INJECTION, POWDER, FOR SOLUTION INTRAMUSCULAR; INTRAVENOUS at 23:42

## 2024-02-20 RX ADMIN — LEVOTHYROXINE SODIUM 50 MCG: 0.03 TABLET ORAL at 10:52

## 2024-02-20 RX ADMIN — SENNOSIDES AND DOCUSATE SODIUM 1 TABLET: 8.6; 5 TABLET ORAL at 08:36

## 2024-02-20 RX ADMIN — PREGABALIN 50 MG: 25 CAPSULE ORAL at 05:28

## 2024-02-20 RX ADMIN — AMPICILLIN SODIUM AND SULBACTAM SODIUM 3 G: 2; 1 INJECTION, POWDER, FOR SOLUTION INTRAMUSCULAR; INTRAVENOUS at 04:32

## 2024-02-20 RX ADMIN — PREGABALIN 50 MG: 25 CAPSULE ORAL at 13:43

## 2024-02-20 RX ADMIN — METOPROLOL SUCCINATE 25 MG: 25 TABLET, EXTENDED RELEASE ORAL at 10:54

## 2024-02-20 RX ADMIN — GUAIFENESIN 600 MG: 600 TABLET, EXTENDED RELEASE ORAL at 10:52

## 2024-02-20 RX ADMIN — NYSTATIN: 100000 POWDER TOPICAL at 10:55

## 2024-02-20 RX ADMIN — FERROUS SULFATE TAB EC 325 MG (65 MG FE EQUIVALENT) 325 MG: 325 (65 FE) TABLET DELAYED RESPONSE at 16:49

## 2024-02-20 RX ADMIN — LORAZEPAM 2 MG: 2 INJECTION INTRAMUSCULAR; INTRAVENOUS at 21:46

## 2024-02-20 RX ADMIN — FERROUS SULFATE TAB EC 325 MG (65 MG FE EQUIVALENT) 325 MG: 325 (65 FE) TABLET DELAYED RESPONSE at 08:36

## 2024-02-20 RX ADMIN — TRAZODONE HYDROCHLORIDE 50 MG: 50 TABLET ORAL at 01:25

## 2024-02-20 ASSESSMENT — ACTIVITIES OF DAILY LIVING (ADL)
ADLS_ACUITY_SCORE: 35
ADLS_ACUITY_SCORE: 39
ADLS_ACUITY_SCORE: 35
ADLS_ACUITY_SCORE: 39
ADLS_ACUITY_SCORE: 35
ADLS_ACUITY_SCORE: 39
ADLS_ACUITY_SCORE: 35
ADLS_ACUITY_SCORE: 35

## 2024-02-20 NOTE — PROGRESS NOTES
"CIWA per nursing student-18. Nurse re-check. !5 per pt objectives. Alert and orient. Sitting on the side of the bead. States \"Im missing my cigarettes, I keep grabbing my oxygen thinking its my cigarettes.\"Medicated with ativan for CIWA. Oxycodone for abdominal and breathing pain.Re-check CIWA-0.  "

## 2024-02-20 NOTE — PLAN OF CARE
Goal Outcome Evaluation:      Plan of Care Reviewed With: patient  Pt alert and slightly confused at times. Sat in the recliner for dinner. Denies pain. O2 at 3 liters. Sats WNL. Pure Wick placed per request.Plan reviewed with night staff.

## 2024-02-20 NOTE — PROGRESS NOTES
[facility]    Medicine Progress Note - Hospitalist Service    Date of Admission:  2/18/2024    Assessment & Plan   Principal Problem:      Acute respiratory failure with hypoxia    Assessment: present on admission, secondary to aspiration pneumonia and COPD. Requiring supplemental oxygen to maintain sats over 90%    Plan: treat pneumonia, wean oxygen as needed      Aspiration pneumonitis (H)    Assessment: present on admission, secondary to alcohol intoxication.     Plan: Unasyn day 2      Alcoholic intoxication without complication (H24)    Assessment: present on admission. BAL 0.12 on admit. Will likely have withdrawal symptoms.     Plan: monitor, CIWA    Active Problems:    CAD (coronary artery disease)    Assessment: present on admission         Alcohol withdrawal (H)    Assessment: alcohol dependence. CIWA scores in the teens    Plan: CIWA protocol      Alcohol abuse      Chronic obstructive pulmonary disease with acute exacerbation (H)    Assessment: present on admission     Plan: monitor, check ABG to ensure no hypercapnia causing sedation.      Hypomagnesemia    Assessment: present on admission     Plan: replace      Acute on chronic anemia  Assessment: typically runs around 8.0 hemoglobin. Presumed blood loss related as well as alcohol related pancytopenia. Low iron levels. Hgb stable  Plan: started iron yesterday. Stop lovenox and aspirin             Diet: Combination Diet Regular Diet Adult, Regular Diet; 2 gm NA Diet    DVT Prophylaxis: Pneumatic Compression Devices  Pérez Catheter: Not present  Lines: None     Cardiac Monitoring: ACTIVE order. Indication: Acute decompensated heart failure (48 hours)  Code Status: Full Code      Clinically Significant Risk Factors            # Hypomagnesemia: Lowest Mg = 1.5 mg/dL in last 2 days, will replace as needed     # Thrombocytopenia: Lowest platelets = 104 in last 2 days, will monitor for bleeding                     Disposition Plan      Expected Discharge Date: 02/20/2024                    John Saldivar MD  Hospitalist Service  [facility]  Securely message with [employer] (more info)  Text page via [facility] Paging/Directory   ______________________________________________________________________    Interval History   Sedated currently. CIWA scores have been high and she has received lorazepam.     Physical Exam   Vital Signs: Temp: 98.5  F (36.9  C) Temp src: Temporal BP: 123/59 Pulse: 95   Resp: 20 SpO2: 95 % O2 Device: None (Room air) Oxygen Delivery: 3 LPM  Weight: 191 lbs 1.6 oz    GENERAL: Comfortable, no apparent distress.  CARDIOVASCULAR: regular rate and rhythm, no murmur. No lower extremity edema   RESPIRATORY: Clear to auscultation bilaterally, no wheezes or crackles.  GI: non-tender, non-distended, normal bowel sounds.   SKIN: warm periphery, no rashes      Medical Decision Making       60 MINUTES SPENT BY ME on the date of service doing chart review, history, exam, documentation & further activities per the note.      Data     I have personally reviewed the following data over the past 24 hrs:    3.5 (L)  \   7.5 (L)   / 104 (L)     142 98 13.6 /  101 (H)   4.1 36 (H) 0.56 \     Ferritin:  N/A % Retic:  N/A LDH:  N/A

## 2024-02-20 NOTE — PLAN OF CARE
Pt is A&Ox4 and vitally stable on 3L NC which is her baseline. Her lungs are still wheezy and crackly. Pt scored anywhere from 3-4 on her CIWA protocol. She is still is receiving IV antibiotics. Will continue to monitor.       Problem: COPD (Chronic Obstructive Pulmonary Disease)  Goal: Optimal Chronic Illness Coping  Outcome: Progressing  Intervention: Support and Optimize Psychosocial Response  Recent Flowsheet Documentation  Taken 2/20/2024 0434 by Jonathon Cevallos RN  Supportive Measures:   verbalization of feelings encouraged   decision-making supported   active listening utilized  Taken 2/19/2024 2037 by Jonathon Cevallos RN  Supportive Measures:   verbalization of feelings encouraged   decision-making supported   active listening utilized     Problem: COPD (Chronic Obstructive Pulmonary Disease)  Goal: Effective Oxygenation and Ventilation  Outcome: Progressing  Intervention: Promote Airway Secretion Clearance  Recent Flowsheet Documentation  Taken 2/20/2024 0434 by Jonathon Cevallos RN  Cough And Deep Breathing: done with encouragement  Activity Management:   activity adjusted per tolerance   sitting, edge of bed  Taken 2/19/2024 2037 by Jonathon Cevallos RN  Cough And Deep Breathing: done with encouragement  Activity Management:   activity adjusted per tolerance   sitting, edge of bed  Intervention: Optimize Oxygenation and Ventilation  Recent Flowsheet Documentation  Taken 2/20/2024 0434 by Jonathon Cevallos RN  Head of Bed (HOB) Positioning: HOB at 15 degrees  Taken 2/19/2024 2037 by Jonathon Cevallos RN  Head of Bed (HOB) Positioning: HOB at 15 degrees

## 2024-02-20 NOTE — PROGRESS NOTES
Interdisciplinary Discharge Planning Note    Anticipated Discharge Date: 2/22    Anticipated Discharge Location: Home    Clinical Needs Before Discharge:  stable functional status    Treatment Needs After Discharge:  None identified    Potential Barriers to Discharge: None identified at this time    OSMEL Correia  2/20/2024,  1:33 PM      Pharmacy faxed in a request for prior authorization on: [facility] [address]    Medication: Ozempic  Dosage: 0.25mg  Quantity requested:  3  Pharmacy for prescription has been selected.    Initiation of prior authorization needed.

## 2024-02-21 ENCOUNTER — APPOINTMENT (OUTPATIENT)
Dept: GENERAL RADIOLOGY | Facility: OTHER | Age: 74
DRG: 178 | End: 2024-02-21
Attending: INTERNAL MEDICINE
Payer: MEDICARE

## 2024-02-21 LAB
ALLEN'S TEST: NO
ANION GAP SERPL CALCULATED.3IONS-SCNC: 9 MMOL/L (ref 7–15)
BASE EXCESS BLDA CALC-SCNC: 8.6 MMOL/L (ref -3–3)
BUN SERPL-MCNC: 7.8 MG/DL (ref 8–23)
CALCIUM SERPL-MCNC: 9.6 MG/DL (ref 8.8–10.2)
CHLORIDE SERPL-SCNC: 98 MMOL/L (ref 98–107)
COHGB MFR BLD: >100 % (ref 95–96)
CREAT SERPL-MCNC: 0.57 MG/DL (ref 0.51–0.95)
DEPRECATED HCO3 PLAS-SCNC: 32 MMOL/L (ref 22–29)
EGFRCR SERPLBLD CKD-EPI 2021: >90 ML/MIN/1.73M2
ERYTHROCYTE [DISTWIDTH] IN BLOOD BY AUTOMATED COUNT: 17 % (ref 10–15)
GLUCOSE SERPL-MCNC: 105 MG/DL (ref 70–99)
HCO3 BLD-SCNC: 36 MMOL/L (ref 21–28)
HCT VFR BLD AUTO: 26.2 % (ref 35–47)
HGB BLD-MCNC: 8 G/DL (ref 11.7–15.7)
MAGNESIUM SERPL-MCNC: 1.9 MG/DL (ref 1.7–2.3)
MCH RBC QN AUTO: 29.6 PG (ref 26.5–33)
MCHC RBC AUTO-ENTMCNC: 30.5 G/DL (ref 31.5–36.5)
MCV RBC AUTO: 97 FL (ref 78–100)
O2/TOTAL GAS SETTING VFR VENT: 48 %
PCO2 BLD: 69 MM HG (ref 35–45)
PH BLD: 7.33 [PH] (ref 7.35–7.45)
PLATELET # BLD AUTO: 117 10E3/UL (ref 150–450)
PO2 BLD: 300 MM HG (ref 80–105)
POTASSIUM SERPL-SCNC: 4.4 MMOL/L (ref 3.4–5.3)
RBC # BLD AUTO: 2.7 10E6/UL (ref 3.8–5.2)
SAO2 % BLDA: 99 % (ref 92–100)
SODIUM SERPL-SCNC: 139 MMOL/L (ref 135–145)
WBC # BLD AUTO: 3.7 10E3/UL (ref 4–11)

## 2024-02-21 PROCEDURE — 200N000001 HC R&B ICU

## 2024-02-21 PROCEDURE — 999N000157 HC STATISTIC RCP TIME EA 10 MIN

## 2024-02-21 PROCEDURE — 36600 WITHDRAWAL OF ARTERIAL BLOOD: CPT | Performed by: INTERNAL MEDICINE

## 2024-02-21 PROCEDURE — 250N000009 HC RX 250: Performed by: INTERNAL MEDICINE

## 2024-02-21 PROCEDURE — 36415 COLL VENOUS BLD VENIPUNCTURE: CPT | Performed by: INTERNAL MEDICINE

## 2024-02-21 PROCEDURE — 99233 SBSQ HOSP IP/OBS HIGH 50: CPT | Performed by: INTERNAL MEDICINE

## 2024-02-21 PROCEDURE — 82805 BLOOD GASES W/O2 SATURATION: CPT | Performed by: INTERNAL MEDICINE

## 2024-02-21 PROCEDURE — 74019 RADEX ABDOMEN 2 VIEWS: CPT

## 2024-02-21 PROCEDURE — 94640 AIRWAY INHALATION TREATMENT: CPT

## 2024-02-21 PROCEDURE — 83735 ASSAY OF MAGNESIUM: CPT | Performed by: INTERNAL MEDICINE

## 2024-02-21 PROCEDURE — 250N000011 HC RX IP 250 OP 636: Performed by: INTERNAL MEDICINE

## 2024-02-21 PROCEDURE — 94640 AIRWAY INHALATION TREATMENT: CPT | Mod: 76

## 2024-02-21 PROCEDURE — 250N000013 HC RX MED GY IP 250 OP 250 PS 637: Performed by: INTERNAL MEDICINE

## 2024-02-21 PROCEDURE — 85018 HEMOGLOBIN: CPT | Performed by: INTERNAL MEDICINE

## 2024-02-21 PROCEDURE — 250N000013 HC RX MED GY IP 250 OP 250 PS 637: Performed by: FAMILY MEDICINE

## 2024-02-21 PROCEDURE — 80048 BASIC METABOLIC PNL TOTAL CA: CPT | Performed by: INTERNAL MEDICINE

## 2024-02-21 RX ORDER — BISACODYL 10 MG
10 SUPPOSITORY, RECTAL RECTAL DAILY PRN
Status: DISCONTINUED | OUTPATIENT
Start: 2024-02-21 | End: 2024-02-25 | Stop reason: HOSPADM

## 2024-02-21 RX ADMIN — LORAZEPAM 2 MG: 2 INJECTION INTRAMUSCULAR; INTRAVENOUS at 23:48

## 2024-02-21 RX ADMIN — GUAIFENESIN 600 MG: 600 TABLET, EXTENDED RELEASE ORAL at 14:16

## 2024-02-21 RX ADMIN — MULTIPLE VITAMINS W/ MINERALS TAB 1 TABLET: TAB at 18:22

## 2024-02-21 RX ADMIN — PANTOPRAZOLE SODIUM 40 MG: 40 TABLET, DELAYED RELEASE ORAL at 11:38

## 2024-02-21 RX ADMIN — LEVOTHYROXINE SODIUM 50 MCG: 0.03 TABLET ORAL at 11:45

## 2024-02-21 RX ADMIN — AMPICILLIN SODIUM AND SULBACTAM SODIUM 3 G: 2; 1 INJECTION, POWDER, FOR SOLUTION INTRAMUSCULAR; INTRAVENOUS at 11:35

## 2024-02-21 RX ADMIN — ONDANSETRON 4 MG: 2 INJECTION INTRAMUSCULAR; INTRAVENOUS at 19:56

## 2024-02-21 RX ADMIN — Medication 10 MG: at 08:41

## 2024-02-21 RX ADMIN — LISINOPRIL 10 MG: 10 TABLET ORAL at 14:17

## 2024-02-21 RX ADMIN — ALLOPURINOL 100 MG: 100 TABLET ORAL at 11:38

## 2024-02-21 RX ADMIN — NYSTATIN: 100000 POWDER TOPICAL at 21:49

## 2024-02-21 RX ADMIN — DEXMEDETOMIDINE HYDROCHLORIDE 0.3 MCG/KG/HR: 4 INJECTION, SOLUTION INTRAVENOUS at 00:03

## 2024-02-21 RX ADMIN — AMPICILLIN SODIUM AND SULBACTAM SODIUM 3 G: 2; 1 INJECTION, POWDER, FOR SOLUTION INTRAMUSCULAR; INTRAVENOUS at 06:43

## 2024-02-21 RX ADMIN — ONDANSETRON 4 MG: 2 INJECTION INTRAMUSCULAR; INTRAVENOUS at 11:18

## 2024-02-21 RX ADMIN — CLONIDINE HYDROCHLORIDE 0.1 MG: 0.1 TABLET ORAL at 14:17

## 2024-02-21 RX ADMIN — AMPICILLIN SODIUM AND SULBACTAM SODIUM 3 G: 2; 1 INJECTION, POWDER, FOR SOLUTION INTRAMUSCULAR; INTRAVENOUS at 18:28

## 2024-02-21 RX ADMIN — NICOTINE POLACRILEX 2 MG: 2 GUM, CHEWING BUCCAL at 21:51

## 2024-02-21 RX ADMIN — LORAZEPAM 2 MG: 2 INJECTION INTRAMUSCULAR; INTRAVENOUS at 11:35

## 2024-02-21 RX ADMIN — PREGABALIN 50 MG: 25 CAPSULE ORAL at 14:17

## 2024-02-21 RX ADMIN — DEXMEDETOMIDINE HYDROCHLORIDE 0.2 MCG/KG/HR: 4 INJECTION, SOLUTION INTRAVENOUS at 22:10

## 2024-02-21 RX ADMIN — ATORVASTATIN CALCIUM 80 MG: 40 TABLET, FILM COATED ORAL at 18:21

## 2024-02-21 RX ADMIN — METOPROLOL SUCCINATE 25 MG: 25 TABLET, EXTENDED RELEASE ORAL at 14:17

## 2024-02-21 RX ADMIN — IPRATROPIUM BROMIDE AND ALBUTEROL SULFATE 3 ML: .5; 3 SOLUTION RESPIRATORY (INHALATION) at 14:30

## 2024-02-21 RX ADMIN — POTASSIUM CHLORIDE 10 MEQ: 750 TABLET, EXTENDED RELEASE ORAL at 18:28

## 2024-02-21 RX ADMIN — FERROUS SULFATE TAB EC 325 MG (65 MG FE EQUIVALENT) 325 MG: 325 (65 FE) TABLET DELAYED RESPONSE at 18:29

## 2024-02-21 RX ADMIN — NYSTATIN: 100000 POWDER TOPICAL at 10:30

## 2024-02-21 RX ADMIN — GUAIFENESIN 600 MG: 600 TABLET, EXTENDED RELEASE ORAL at 21:47

## 2024-02-21 RX ADMIN — LORAZEPAM 2 MG: 2 INJECTION INTRAMUSCULAR; INTRAVENOUS at 19:56

## 2024-02-21 RX ADMIN — PREGABALIN 50 MG: 25 CAPSULE ORAL at 21:47

## 2024-02-21 RX ADMIN — ALLOPURINOL 100 MG: 100 TABLET ORAL at 21:47

## 2024-02-21 RX ADMIN — NICOTINE POLACRILEX 2 MG: 2 GUM, CHEWING BUCCAL at 14:16

## 2024-02-21 RX ADMIN — LORAZEPAM 1 MG: 2 INJECTION INTRAMUSCULAR; INTRAVENOUS at 23:14

## 2024-02-21 RX ADMIN — FOLIC ACID 1 MG: 1 TABLET ORAL at 14:17

## 2024-02-21 RX ADMIN — IPRATROPIUM BROMIDE AND ALBUTEROL SULFATE 3 ML: .5; 3 SOLUTION RESPIRATORY (INHALATION) at 07:39

## 2024-02-21 RX ADMIN — PANTOPRAZOLE SODIUM 40 MG: 40 TABLET, DELAYED RELEASE ORAL at 21:47

## 2024-02-21 RX ADMIN — SERTRALINE HYDROCHLORIDE 100 MG: 50 TABLET ORAL at 14:16

## 2024-02-21 RX ADMIN — THIAMINE HCL TAB 100 MG 100 MG: 100 TAB at 18:22

## 2024-02-21 RX ADMIN — AMPICILLIN SODIUM AND SULBACTAM SODIUM 3 G: 2; 1 INJECTION, POWDER, FOR SOLUTION INTRAMUSCULAR; INTRAVENOUS at 23:14

## 2024-02-21 ASSESSMENT — ACTIVITIES OF DAILY LIVING (ADL)
ADLS_ACUITY_SCORE: 35
ADLS_ACUITY_SCORE: 34
ADLS_ACUITY_SCORE: 35
ADLS_ACUITY_SCORE: 34
ADLS_ACUITY_SCORE: 36
ADLS_ACUITY_SCORE: 36
ADLS_ACUITY_SCORE: 34
ADLS_ACUITY_SCORE: 35
ADLS_ACUITY_SCORE: 35
ADLS_ACUITY_SCORE: 34
ADLS_ACUITY_SCORE: 36

## 2024-02-21 NOTE — SIGNIFICANT EVENT
Significant Event Note    Time of event: 9:49 PM February 20, 2024    Description of event:  Patient with increasing withdrawal symptoms.     Plan:  Move to ICU and start on a precedex drip    Discussed with: bedside nurse    Tanmay Lopze MD

## 2024-02-21 NOTE — PROGRESS NOTES
Pt awakens to have a bm. Steady with 2 assist to commode. Large formed stools and voided. Am cares and sponge bath given along with lotion. Pt continues to make appropriate conversation. Nauseated and complains of a headache.  CIWA 13. Back to bed .Medicated with ativan. Bladder scan 5 mls. 1210 Pt snoring.De-sats to 76% on 3 liters per NC. Oxy mask placed and encouraged to take deep breaths. O2 remains at 3 liters.

## 2024-02-21 NOTE — PROGRESS NOTES
Dr Saldivar here. Plan reviewed. Bladder scan for 360. Pt coughing and voiding into purewick. Re-scanned at 0845 200-202. Will continue to monitor. Dulcolax suppository given. Pt remains sedated and opens eyes momentarily to voice or pain. O2 on at 3 liters per NC. Lungs clearing. Breathing easy.

## 2024-02-21 NOTE — PROGRESS NOTES
"At 2035 hrs the patient began to have severe hallucinations, patient was given 2 mg of PRN IV ativan. At 2050 hrs the patient began to climb out of bed stating that she could not find her mother and that \"the babies are outside and they are cold.\" Staff assisted the patient back into bed and attempted to assure her that she is safe in the hospital. The patient was also talking to self and reaching for things in the air. Pt had also pulled off her nasal cannula, her O2 sats dropped to 79%, nasal cannula was placed back on and was increased to 5L. O2 sats returned to 94% and was titrated back down to 3 L.     At 2130 hrs, the patient was very agitated and having continuous hallucinations. Writer contacted Dr. Lopez and he put in orders for the patient to be transferred to the ICU. At 2146 hrs a second dose of PRN IV 2 mg ativan was given. The patient was still asking where her mother was.     At 2200 hrs, the patient was yelling out and grabbing for items in the air. Staff attempted to speak to the patient but her speech is slurred and unintelligible.     At 2250 the patient was transferred to the ICU. Report was given to DORINA Schwartz.   "

## 2024-02-21 NOTE — PLAN OF CARE
"  Transferred pt from med/surg to ICU last noc at 23:00 and started on precedex gtt; currently running at 0.2mcg/kg/hr. currently scoring 4 on CIWA. Had 2 episodes of hypoxia and needed temporary increase in O2 but was able to recover and remained on 3L majority of night. Mouth breathing oxy mask helpful.     Problem: Adult Inpatient Plan of Care  Goal: Plan of Care Review  Description: The Plan of Care Review/Shift note should be completed every shift.  The Outcome Evaluation is a brief statement about your assessment that the patient is improving, declining, or no change.  This information will be displayed automatically on your shift  note.  Outcome: Progressing  Goal: Patient-Specific Goal (Individualized)  Description: You can add care plan individualizations to a care plan. Examples of Individualization might be:  \"Parent requests to be called daily at 9am for status\", \"I have a hard time hearing out of my right ear\", or \"Do not touch me to wake me up as it startles  me\".  Outcome: Progressing  Goal: Absence of Hospital-Acquired Illness or Injury  Outcome: Progressing  Intervention: Identify and Manage Fall Risk  Recent Flowsheet Documentation  Taken 2/21/2024 0400 by Christina Hill, RN  Safety Promotion/Fall Prevention:   activity supervised   assistive device/personal items within reach   clutter free environment maintained   increased rounding and observation   nonskid shoes/slippers when out of bed   room organization consistent   safety round/check completed   supervised activity  Taken 2/21/2024 0000 by Christina Hill, RN  Safety Promotion/Fall Prevention:   activity supervised   assistive device/personal items within reach   clutter free environment maintained   increased rounding and observation   nonskid shoes/slippers when out of bed   room organization consistent   safety round/check completed   supervised activity  Intervention: Prevent and Manage VTE (Venous Thromboembolism) Risk  Recent Flowsheet " Documentation  Taken 2/21/2024 0400 by Christina Hill RN  VTE Prevention/Management: SCDs (sequential compression devices) on  Taken 2/21/2024 0000 by Christina Hill RN  VTE Prevention/Management: SCDs (sequential compression devices) on  Goal: Optimal Comfort and Wellbeing  Outcome: Progressing  Goal: Readiness for Transition of Care  Outcome: Progressing     Problem: COPD (Chronic Obstructive Pulmonary Disease)  Goal: Optimal Chronic Illness Coping  Outcome: Progressing  Goal: Optimal Level of Functional [place]  Outcome: Progressing  Goal: Absence of Infection Signs and Symptoms  Outcome: Progressing  Goal: Improved Oral Intake  Outcome: Progressing  Goal: Effective Oxygenation and Ventilation  Outcome: Progressing  Intervention: Promote Airway Secretion Clearance  Recent Flowsheet Documentation  Taken 2/21/2024 0400 by Christina Hill RN  Cough And Deep Breathing: unable to perform  Taken 2/21/2024 0000 by Christina Hill RN  Cough And Deep Breathing: unable to perform     Problem: Excessive Substance Use  Goal: Optimized Energy Level (Excessive Substance Use)  Outcome: Progressing  Goal: Improved Behavioral Control (Excessive Substance Use)  Outcome: Progressing  Goal: Increased Participation and Engagement (Excessive Substance Use)  Outcome: Progressing  Goal: Improved Physiologic Symptoms (Excessive Substance Use)  Outcome: Progressing  Goal: Enhanced Social, Occupational or Functional Skills (Excessive Substance Use)  Outcome: Progressing   Goal Outcome Evaluation:

## 2024-02-21 NOTE — PROGRESS NOTES
[facility]    Medicine Progress Note - Hospitalist Service    Date of Admission:  2/18/2024    Assessment & Plan   Principal Problem:      Alcohol withdrawal (H)    Assessment: alcohol dependence. CIWA scores in the teens, worsening overnight and now on precedex drip.     Plan: CIWA protocol, wean precedex as able      Acute respiratory failure with hypoxia    Assessment: present on admission, secondary to aspiration pneumonia and COPD. Requiring supplemental oxygen to maintain sats over 90%. ABG shows slight uptick in PCO2.    Plan: treat pneumonia, wean oxygen as needed. Monitor for symptoms of hypercapnia.       Aspiration pneumonitis (H)    Assessment: present on admission, secondary to alcohol intoxication.     Plan: Unasyn day 3, given precedex will continue with IV antibiotics       Alcoholic intoxication without complication (H24)    Assessment: present on admission. BAL 0.12 on admit.     Plan: monitor, CIWA    Active Problems:    CAD (coronary artery disease)    Assessment: present on admission         Alcohol abuse      Chronic obstructive pulmonary disease with acute exacerbation (H)    Assessment: present on admission     Plan: monitor, check ABG again in AM      Hypomagnesemia    Assessment: present on admission     Plan: replace      Acute on chronic anemia  Assessment: typically runs around 8.0 hemoglobin. Presumed blood loss related as well as alcohol related pancytopenia. Low iron levels. Hgb stable  Plan: started iron. Stopped lovenox and aspirin     Abdomen pain  Assessment: distended. CT reviewed from Jan 2024 shows no ascites. Xray today shows no free air or air fluid levels. Suspect constipation.   Plan: suppository and fleets. Ambulate.    Chronic systolic heart failure   Assessment: no obvious heart failure symptoms  Plan: monitor        Diet: Combination Diet Regular Diet Adult, Regular Diet; 2 gm NA Diet    DVT Prophylaxis: Pneumatic Compression Devices  Pérez  Catheter: Not present  Lines: None     Cardiac Monitoring: ACTIVE order. Indication: Acute decompensated heart failure (48 hours)  Code Status: Full Code      Clinically Significant Risk Factors                # Thrombocytopenia: Lowest platelets = 104 in last 2 days, will monitor for bleeding                     Disposition Plan    2-3 days         John Saldivar MD  Hospitalist Service  [facility]  Securely message with [employer] (more info)  Text page via [employer] Paging/Directory   ______________________________________________________________________    Interval History   Sleepy but follows commands while on precedex drip.     Physical Exam   Vital Signs: Temp: 97.5  F (36.4  C) Temp src: Temporal BP: 127/63 Pulse: 73   Resp: 11 SpO2: 100 % O2 Device: Nasal cannula Oxygen Delivery: 3 LPM  Weight: 202 lbs 6.12 oz    GENERAL: Comfortable, no apparent distress.  CARDIOVASCULAR: regular rate and rhythm, no murmur. No lower extremity edema   RESPIRATORY: Clear to auscultation bilaterally, no wheezes or crackles.  GI: non-tender, non-distended, normal bowel sounds.   SKIN: warm periphery, no rashes      Medical Decision Making       65 MINUTES SPENT BY ME on the date of service doing chart review, history, exam, documentation & further activities per the note.      Data     I have personally reviewed the following data over the past 24 hrs:    3.7 (L)  \   8.0 (L)   / 117 (L)     139 98 7.8 (L) /  105 (H)   4.4 32 (H) 0.57 \       Imaging results reviewed over the past 24 hrs:   Recent Results (from the past 24 hour(s))   XR Abdomen Port 2 Views    Narrative    PROCEDURE:  XR ABDOMEN PORT 2 VIEWS    HISTORY: abd distension    TECHNIQUE:  AP upright and supine radiographs of the abdomen.    COMPARISON:  CT chest abdomen pelvis 1/17/2024    FINDINGS:     Bibasilar atelectasis. No free intra-abdominal air.     No dilated loops of small bowel or air-fluid levels are seen.     No suspicious osseous  lesions. Right total hip arthroplasty. Urinary  catheter in place.      Impression    IMPRESSION:    No obstruction or free air.    MONICA LAUREN MD         SYSTEM ID:  [id number]

## 2024-02-21 NOTE — PROGRESS NOTES
Pt remains on chronic 3 lpm via oxymask. PRN neb tx given 2x during shift due to wheezing. No further RT intervention needed.   Cande Doll, RT

## 2024-02-21 NOTE — PROGRESS NOTES
Currently on baseline 3L.  Oxygen titrated overnight to keep sats above 90.  Patient does desat with activity.

## 2024-02-22 ENCOUNTER — ANESTHESIA (OUTPATIENT)
Dept: INTENSIVE CARE | Facility: OTHER | Age: 74
DRG: 178 | End: 2024-02-22
Payer: MEDICARE

## 2024-02-22 ENCOUNTER — ANESTHESIA EVENT (OUTPATIENT)
Dept: INTENSIVE CARE | Facility: OTHER | Age: 74
DRG: 178 | End: 2024-02-22
Payer: MEDICARE

## 2024-02-22 LAB
ALLEN'S TEST: NO
ANION GAP SERPL CALCULATED.3IONS-SCNC: 12 MMOL/L (ref 7–15)
BASE EXCESS BLDA CALC-SCNC: 4.4 MMOL/L (ref -3–3)
BUN SERPL-MCNC: 8.3 MG/DL (ref 8–23)
CALCIUM SERPL-MCNC: 9.9 MG/DL (ref 8.8–10.2)
CHLORIDE SERPL-SCNC: 101 MMOL/L (ref 98–107)
COHGB MFR BLD: 89.9 % (ref 95–96)
CREAT SERPL-MCNC: 0.63 MG/DL (ref 0.51–0.95)
DEPRECATED HCO3 PLAS-SCNC: 30 MMOL/L (ref 22–29)
EGFRCR SERPLBLD CKD-EPI 2021: >90 ML/MIN/1.73M2
ERYTHROCYTE [DISTWIDTH] IN BLOOD BY AUTOMATED COUNT: 17.1 % (ref 10–15)
GLUCOSE BLDC GLUCOMTR-MCNC: 116 MG/DL (ref 70–99)
GLUCOSE SERPL-MCNC: 117 MG/DL (ref 70–99)
HCO3 BLD-SCNC: 31 MMOL/L (ref 21–28)
HCT VFR BLD AUTO: 28.7 % (ref 35–47)
HGB BLD-MCNC: 8.7 G/DL (ref 11.7–15.7)
MAGNESIUM SERPL-MCNC: 1.8 MG/DL (ref 1.7–2.3)
MCH RBC QN AUTO: 30 PG (ref 26.5–33)
MCHC RBC AUTO-ENTMCNC: 30.3 G/DL (ref 31.5–36.5)
MCV RBC AUTO: 99 FL (ref 78–100)
O2/TOTAL GAS SETTING VFR VENT: 32 %
PCO2 BLD: 56 MM HG (ref 35–45)
PH BLD: 7.35 [PH] (ref 7.35–7.45)
PLATELET # BLD AUTO: 116 10E3/UL (ref 150–450)
PO2 BLD: 60 MM HG (ref 80–105)
POTASSIUM SERPL-SCNC: 4.7 MMOL/L (ref 3.4–5.3)
RBC # BLD AUTO: 2.9 10E6/UL (ref 3.8–5.2)
SAO2 % BLDA: 88 % (ref 92–100)
SODIUM SERPL-SCNC: 143 MMOL/L (ref 135–145)
WBC # BLD AUTO: 5.3 10E3/UL (ref 4–11)

## 2024-02-22 PROCEDURE — 250N000013 HC RX MED GY IP 250 OP 250 PS 637: Performed by: INTERNAL MEDICINE

## 2024-02-22 PROCEDURE — C9113 INJ PANTOPRAZOLE SODIUM, VIA: HCPCS | Performed by: INTERNAL MEDICINE

## 2024-02-22 PROCEDURE — 94640 AIRWAY INHALATION TREATMENT: CPT

## 2024-02-22 PROCEDURE — 250N000009 HC RX 250: Performed by: INTERNAL MEDICINE

## 2024-02-22 PROCEDURE — 99232 SBSQ HOSP IP/OBS MODERATE 35: CPT | Performed by: INTERNAL MEDICINE

## 2024-02-22 PROCEDURE — 999N000157 HC STATISTIC RCP TIME EA 10 MIN

## 2024-02-22 PROCEDURE — 36415 COLL VENOUS BLD VENIPUNCTURE: CPT | Performed by: INTERNAL MEDICINE

## 2024-02-22 PROCEDURE — 36410 VNPNXR 3YR/> PHY/QHP DX/THER: CPT | Performed by: NURSE ANESTHETIST, CERTIFIED REGISTERED

## 2024-02-22 PROCEDURE — 36600 WITHDRAWAL OF ARTERIAL BLOOD: CPT | Performed by: INTERNAL MEDICINE

## 2024-02-22 PROCEDURE — 250N000013 HC RX MED GY IP 250 OP 250 PS 637: Performed by: FAMILY MEDICINE

## 2024-02-22 PROCEDURE — 80048 BASIC METABOLIC PNL TOTAL CA: CPT | Performed by: INTERNAL MEDICINE

## 2024-02-22 PROCEDURE — 250N000009 HC RX 250: Performed by: NURSE ANESTHETIST, CERTIFIED REGISTERED

## 2024-02-22 PROCEDURE — 82805 BLOOD GASES W/O2 SATURATION: CPT | Performed by: INTERNAL MEDICINE

## 2024-02-22 PROCEDURE — 83735 ASSAY OF MAGNESIUM: CPT | Performed by: INTERNAL MEDICINE

## 2024-02-22 PROCEDURE — 250N000011 HC RX IP 250 OP 636: Mod: JZ | Performed by: INTERNAL MEDICINE

## 2024-02-22 PROCEDURE — 85027 COMPLETE CBC AUTOMATED: CPT | Performed by: INTERNAL MEDICINE

## 2024-02-22 PROCEDURE — 200N000001 HC R&B ICU

## 2024-02-22 PROCEDURE — 250N000011 HC RX IP 250 OP 636: Performed by: INTERNAL MEDICINE

## 2024-02-22 RX ORDER — LIDOCAINE HYDROCHLORIDE 10 MG/ML
INJECTION, SOLUTION EPIDURAL; INFILTRATION; INTRACAUDAL; PERINEURAL PRN
Status: DISCONTINUED | OUTPATIENT
Start: 2024-02-22 | End: 2024-02-22

## 2024-02-22 RX ORDER — FUROSEMIDE 10 MG/ML
40 INJECTION INTRAMUSCULAR; INTRAVENOUS ONCE
Status: COMPLETED | OUTPATIENT
Start: 2024-02-22 | End: 2024-02-22

## 2024-02-22 RX ADMIN — GUAIFENESIN 600 MG: 600 TABLET, EXTENDED RELEASE ORAL at 21:11

## 2024-02-22 RX ADMIN — FERROUS SULFATE TAB EC 325 MG (65 MG FE EQUIVALENT) 325 MG: 325 (65 FE) TABLET DELAYED RESPONSE at 18:28

## 2024-02-22 RX ADMIN — FOLIC ACID 1 MG: 1 TABLET ORAL at 21:13

## 2024-02-22 RX ADMIN — PANTOPRAZOLE SODIUM 40 MG: 40 INJECTION, POWDER, FOR SOLUTION INTRAVENOUS at 21:18

## 2024-02-22 RX ADMIN — AMPICILLIN SODIUM AND SULBACTAM SODIUM 3 G: 2; 1 INJECTION, POWDER, FOR SOLUTION INTRAMUSCULAR; INTRAVENOUS at 05:40

## 2024-02-22 RX ADMIN — AMPICILLIN SODIUM AND SULBACTAM SODIUM 3 G: 2; 1 INJECTION, POWDER, FOR SOLUTION INTRAMUSCULAR; INTRAVENOUS at 11:45

## 2024-02-22 RX ADMIN — AMPICILLIN SODIUM AND SULBACTAM SODIUM 3 G: 2; 1 INJECTION, POWDER, FOR SOLUTION INTRAMUSCULAR; INTRAVENOUS at 18:30

## 2024-02-22 RX ADMIN — NICOTINE POLACRILEX 2 MG: 2 GUM, CHEWING BUCCAL at 19:45

## 2024-02-22 RX ADMIN — TRAZODONE HYDROCHLORIDE 50 MG: 50 TABLET ORAL at 22:35

## 2024-02-22 RX ADMIN — IPRATROPIUM BROMIDE AND ALBUTEROL SULFATE 3 ML: .5; 3 SOLUTION RESPIRATORY (INHALATION) at 05:58

## 2024-02-22 RX ADMIN — LIDOCAINE HYDROCHLORIDE 0.5 MG: 10 INJECTION, SOLUTION EPIDURAL; INFILTRATION; INTRACAUDAL; PERINEURAL at 14:33

## 2024-02-22 RX ADMIN — PANTOPRAZOLE SODIUM 40 MG: 40 INJECTION, POWDER, FOR SOLUTION INTRAVENOUS at 12:26

## 2024-02-22 RX ADMIN — NYSTATIN: 100000 POWDER TOPICAL at 11:31

## 2024-02-22 RX ADMIN — DEXMEDETOMIDINE HYDROCHLORIDE 1.2 MCG/KG/HR: 4 INJECTION, SOLUTION INTRAVENOUS at 02:15

## 2024-02-22 RX ADMIN — POTASSIUM CHLORIDE 10 MEQ: 750 TABLET, EXTENDED RELEASE ORAL at 21:15

## 2024-02-22 RX ADMIN — ALLOPURINOL 100 MG: 100 TABLET ORAL at 21:13

## 2024-02-22 RX ADMIN — FERROUS SULFATE TAB EC 325 MG (65 MG FE EQUIVALENT) 325 MG: 325 (65 FE) TABLET DELAYED RESPONSE at 21:15

## 2024-02-22 RX ADMIN — METOPROLOL SUCCINATE 25 MG: 25 TABLET, EXTENDED RELEASE ORAL at 21:16

## 2024-02-22 RX ADMIN — Medication 6 MG: at 22:35

## 2024-02-22 RX ADMIN — FUROSEMIDE 40 MG: 10 INJECTION, SOLUTION INTRAMUSCULAR; INTRAVENOUS at 09:05

## 2024-02-22 RX ADMIN — MULTIPLE VITAMINS W/ MINERALS TAB 1 TABLET: TAB at 21:16

## 2024-02-22 RX ADMIN — LORAZEPAM 2 MG: 2 INJECTION INTRAMUSCULAR; INTRAVENOUS at 12:18

## 2024-02-22 RX ADMIN — PREGABALIN 50 MG: 25 CAPSULE ORAL at 21:11

## 2024-02-22 RX ADMIN — CLONIDINE HYDROCHLORIDE 0.1 MG: 0.1 TABLET ORAL at 21:13

## 2024-02-22 RX ADMIN — LORAZEPAM 2 MG: 2 INJECTION INTRAMUSCULAR; INTRAVENOUS at 18:55

## 2024-02-22 ASSESSMENT — ACTIVITIES OF DAILY LIVING (ADL)
ADLS_ACUITY_SCORE: 34
ADLS_ACUITY_SCORE: 40
ADLS_ACUITY_SCORE: 36
ADLS_ACUITY_SCORE: 40
ADLS_ACUITY_SCORE: 34
ADLS_ACUITY_SCORE: 40
ADLS_ACUITY_SCORE: 36
ADLS_ACUITY_SCORE: 34

## 2024-02-22 NOTE — PROGRESS NOTES
:     Patient is from home.      to follow for discharge planning needs.     OSMEL Cantu on 2/22/2024 at 10:53 AM

## 2024-02-22 NOTE — PLAN OF CARE
"  Problem: Adult Inpatient Plan of Care  Goal: Plan of Care Review  Description: The Plan of Care Review/Shift note should be completed every shift.  The Outcome Evaluation is a brief statement about your assessment that the patient is improving, declining, or no change.  This information will be displayed automatically on your shift  note.  Outcome: Progressing  Flowsheets (Taken 2/21/2024 1942)  Plan of Care Reviewed With: patient  Goal: Patient-Specific Goal (Individualized)  Description: You can add care plan individualizations to a care plan. Examples of Individualization might be:  \"Parent requests to be called daily at 9am for status\", \"I have a hard time hearing out of my right ear\", or \"Do not touch me to wake me up as it startles  me\".  Outcome: Progressing  Goal: Absence of Hospital-Acquired Illness or Injury  Outcome: Progressing  Intervention: Identify and Manage Fall Risk  Recent Flowsheet Documentation  Taken 2/21/2024 1700 by Sana Lazo RN  Safety Promotion/Fall Prevention:   activity supervised   assistive device/personal items within reach   clutter free environment maintained   increased rounding and observation   nonskid shoes/slippers when out of bed   room organization consistent   safety round/check completed   supervised activity  Taken 2/21/2024 0730 by Sana Lazo RN  Safety Promotion/Fall Prevention:   activity supervised   assistive device/personal items within reach   clutter free environment maintained   increased rounding and observation   nonskid shoes/slippers when out of bed   room organization consistent   safety round/check completed   supervised activity  Intervention: Prevent and Manage VTE (Venous Thromboembolism) Risk  Recent Flowsheet Documentation  Taken 2/21/2024 1700 by Sana Lazo RN  VTE Prevention/Management: SCDs (sequential compression devices) on  Taken 2/21/2024 0730 by Sana Lazo RN  VTE Prevention/Management: SCDs " (sequential compression devices) on  Intervention: Prevent Infection  Recent Flowsheet Documentation  Taken 2/21/2024 1700 by Sana Lazo RN  Infection Prevention:   hand hygiene promoted   rest/sleep promoted  Taken 2/21/2024 0730 by Sana Lazo RN  Infection Prevention:   hand hygiene promoted   rest/sleep promoted  Goal: Optimal Comfort and Wellbeing  Outcome: Progressing  Intervention: Provide Person-Centered Care  Recent Flowsheet Documentation  Taken 2/21/2024 1700 by Sana Lazo RN  Trust Relationship/Rapport:   care explained   choices provided   emotional support provided   empathic listening provided   reassurance provided  Taken 2/21/2024 0730 by Sana Lazo RN  Trust Relationship/Rapport:   care explained   choices provided   emotional support provided   empathic listening provided   reassurance provided  Goal: Readiness for Transition of Care  Outcome: Progressing     Problem: COPD (Chronic Obstructive Pulmonary Disease)  Goal: Optimal Chronic Illness Coping  Outcome: Progressing  Intervention: Support and Optimize Psychosocial Response  Recent Flowsheet Documentation  Taken 2/21/2024 1700 by Sana Lazo RN  Supportive Measures: goal-setting facilitated  Taken 2/21/2024 0730 by Sana Lazo RN  Supportive Measures: goal-setting facilitated  Goal: Optimal Level of Functional [place]  Outcome: Progressing  Intervention: Optimize Functional Ability  Recent Flowsheet Documentation  Taken 2/21/2024 1700 by Sana Lazo RN  Activity Management: activity adjusted per tolerance  Environmental Support:   calm environment promoted   comfort object encouraged  Taken 2/21/2024 1200 by Sana Lazo RN  Activity Management: activity adjusted per tolerance  Taken 2/21/2024 0730 by Sana Lazo RN  Activity Management: activity adjusted per tolerance  Environmental Support:   calm environment promoted   comfort object  encouraged  Goal: Absence of Infection Signs and Symptoms  Outcome: Progressing  Goal: Improved Oral Intake  Outcome: Progressing  Goal: Effective Oxygenation and Ventilation  Outcome: Progressing  Intervention: Promote Airway Secretion Clearance  Recent Flowsheet Documentation  Taken 2/21/2024 1700 by Sana Lazo RN  Cough And Deep Breathing: unable to perform  Activity Management: activity adjusted per tolerance  Taken 2/21/2024 1200 by Sana Lazo RN  Cough And Deep Breathing: done independently per patient  Activity Management: activity adjusted per tolerance  Taken 2/21/2024 0730 by Sana Lazo RN  Cough And Deep Breathing: unable to perform  Activity Management: activity adjusted per tolerance     Problem: Excessive Substance Use  Goal: Optimized Energy Level (Excessive Substance Use)  Outcome: Progressing  Goal: Improved Behavioral Control (Excessive Substance Use)  Outcome: Progressing  Goal: Increased Participation and Engagement (Excessive Substance Use)  Outcome: Progressing  Intervention: Facilitate Participation and Engagement  Recent Flowsheet Documentation  Taken 2/21/2024 1700 by Sana Lazo RN  Supportive Measures: goal-setting facilitated  Taken 2/21/2024 0730 by Sana Lazo RN  Supportive Measures: goal-setting facilitated  Goal: Improved Physiologic Symptoms (Excessive Substance Use)  Outcome: Progressing  Goal: Enhanced Social, Occupational or Functional Skills (Excessive Substance Use)  Outcome: Progressing  Intervention: Promote Social, Occupational and Functional Ability  Recent Flowsheet Documentation  Taken 2/21/2024 1700 by Sana Lazo RN  Trust Relationship/Rapport:   care explained   choices provided   emotional support provided   empathic listening provided   reassurance provided  Taken 2/21/2024 0730 by Sana Lazo RN  Trust Relationship/Rapport:   care explained   choices provided   emotional support  provided   empathic listening provided   reassurance provided   Goal Outcome Evaluation:      Plan of Care Reviewed With: patient

## 2024-02-22 NOTE — PROGRESS NOTES
Patient woke up, she reports having severe hallucinations, she continuously tries to drink from a cup that is not there.  She is oriented to person and place.  In summary, patient is still experiencing significant withdrawal symptoms.

## 2024-02-22 NOTE — PROGRESS NOTES
Interdisciplinary Discharge Planning Note    Anticipated Discharge Date:TBD     Anticipated Discharge Location: TBD     Clinical Needs Before Discharge:   Medical Stability     Treatment Needs After Discharge:  rehab (PT, OT, ST)    Potential Barriers to Discharge: None Identified     OSMEL Cantu  2/22/2024,  12:49 PM

## 2024-02-22 NOTE — PROGRESS NOTES
Pt alert and orient. Up to chair. Ate 40% of meal per self. Desats x 2 today to 76. Sats up to 90's with encouragement. Remains on 3 liters Albuterol x 2 today. Clears wheezing. Yellow sputum noted. Large incontinent tonight. Tele NSR.

## 2024-02-22 NOTE — PLAN OF CARE
"  Pt became more agitated and confused last night around 2100 placed back on precedex and given ativan according to CIWA scores (see doc. Flowsheets). Currently on precedex gtt at 0.2mcg/kg/hr and drowsy. Prn neb x1 for wheezing.    Problem: Adult Inpatient Plan of Care  Goal: Plan of Care Review  Description: The Plan of Care Review/Shift note should be completed every shift.  The Outcome Evaluation is a brief statement about your assessment that the patient is improving, declining, or no change.  This information will be displayed automatically on your shift  note.  Outcome: Progressing  Goal: Patient-Specific Goal (Individualized)  Description: You can add care plan individualizations to a care plan. Examples of Individualization might be:  \"Parent requests to be called daily at 9am for status\", \"I have a hard time hearing out of my right ear\", or \"Do not touch me to wake me up as it startles  me\".  Outcome: Progressing  Goal: Absence of Hospital-Acquired Illness or Injury  Outcome: Progressing  Intervention: Identify and Manage Fall Risk  Recent Flowsheet Documentation  Taken 2/22/2024 0000 by Christina Hill RN  Safety Promotion/Fall Prevention:   activity supervised   assistive device/personal items within reach   clutter free environment maintained   increased rounding and observation   nonskid shoes/slippers when out of bed   room organization consistent   safety round/check completed   supervised activity  Taken 2/21/2024 2100 by Christina Hill, DORINA  Safety Promotion/Fall Prevention:   activity supervised   assistive device/personal items within reach   clutter free environment maintained   increased rounding and observation   nonskid shoes/slippers when out of bed   room organization consistent   safety round/check completed   supervised activity  Intervention: Prevent and Manage VTE (Venous Thromboembolism) Risk  Recent Flowsheet Documentation  Taken 2/22/2024 0000 by Christina Hill RN  VTE " Prevention/Management: SCDs (sequential compression devices) on  Taken 2/21/2024 2100 by Christina Hill RN  VTE Prevention/Management: SCDs (sequential compression devices) on  Goal: Optimal Comfort and Wellbeing  Outcome: Progressing  Goal: Readiness for Transition of Care  Outcome: Progressing     Problem: COPD (Chronic Obstructive Pulmonary Disease)  Goal: Optimal Chronic Illness Coping  Outcome: Progressing  Goal: Optimal Level of Functional [place]  Outcome: Progressing  Goal: Absence of Infection Signs and Symptoms  Outcome: Progressing  Goal: Improved Oral Intake  Outcome: Progressing  Goal: Effective Oxygenation and Ventilation  Outcome: Progressing  Intervention: Promote Airway Secretion Clearance  Recent Flowsheet Documentation  Taken 2/22/2024 0000 by Christina Hill RN  Cough And Deep Breathing: unable to perform  Taken 2/21/2024 2100 by Christina Hill RN  Cough And Deep Breathing: unable to perform     Problem: Excessive Substance Use  Goal: Optimized Energy Level (Excessive Substance Use)  Outcome: Progressing  Goal: Improved Behavioral Control (Excessive Substance Use)  Outcome: Progressing  Goal: Increased Participation and Engagement (Excessive Substance Use)  Outcome: Progressing  Goal: Improved Physiologic Symptoms (Excessive Substance Use)  Outcome: Progressing  Goal: Enhanced Social, Occupational or Functional Skills (Excessive Substance Use)  Outcome: Progressing   Goal Outcome Evaluation:

## 2024-02-22 NOTE — PROGRESS NOTES
[facility]    Medicine Progress Note - Hospitalist Service    Date of Admission:  2/18/2024    Assessment & Plan   Principal Problem:      Alcohol withdrawal (H)    Assessment: alcohol dependence. Elevated CIWA scores, on and off precedex. Should be emerging from withdrawal symptoms now.    Plan: CIWA protocol, wean precedex as able      Acute respiratory failure with hypoxia    Assessment: present on admission, secondary to aspiration pneumonia and COPD. Requiring supplemental oxygen to maintain sats over 90%.     Plan: treat pneumonia, wean oxygen as needed. Monitor for symptoms of hypercapnia.       Aspiration pneumonitis (H)    Assessment: present on admission, secondary to alcohol intoxication.     Plan: Unasyn day 4, given sedation will continue IV antibiotics       Alcoholic intoxication without complication (H24)    Assessment: present on admission. BAL 0.12 on admit.     Plan: monitor, CIWA    Active Problems:    CAD (coronary artery disease)    Assessment: present on admission         Alcohol abuse      Chronic obstructive pulmonary disease with acute exacerbation (H)    Assessment: present on admission     Plan: monitor, check ABG again in AM      Hypomagnesemia    Assessment: present on admission     Plan: replace      Acute on chronic anemia  Assessment: typically runs around 8.0 hemoglobin. Presumed blood loss related as well as alcohol related pancytopenia. Low iron levels. Hgb stable  Plan: continue iron when awake    Abdomen pain  Assessment: distended. CT reviewed from Jan 2024 shows no ascites. Xray today shows no free air or air fluid levels. Suspect constipation.   Plan: suppository and fleets. Ambulate.    Chronic systolic heart failure   Assessment: no obvious heart failure symptoms  Plan: monitor          Diet: Combination Diet Regular Diet Adult, Regular Diet; 2 gm NA Diet    DVT Prophylaxis: Pneumatic Compression Devices  Pérez Catheter: Not present  Lines: None      Cardiac Monitoring: ACTIVE order. Indication: Acute decompensated heart failure (48 hours)  Code Status: Full Code      Clinically Significant Risk Factors                # Thrombocytopenia: Lowest platelets = 116 in last 2 days, will monitor for bleeding                     Disposition Plan    2-3 days         John Saldivar MD  Hospitalist Service  [facility]  Securely message with [employer] (more info)  Text page via [employer] Paging/Directory   ______________________________________________________________________    Interval History   Sleepy, hemodynamically stable    Physical Exam   Vital Signs: Temp: 98  F (36.7  C) Temp src: Temporal BP: 129/69 Pulse: 76   Resp: 11 SpO2: 92 % O2 Device: Oxymask Oxygen Delivery: 3 LPM  Weight: 203 lbs .7 oz    GENERAL: Comfortable, no apparent distress.  CARDIOVASCULAR: regular rate and rhythm, no murmur. No lower extremity edema   RESPIRATORY: Clear to auscultation bilaterally, no wheezes or crackles.  GI: non-tender, non-distended, normal bowel sounds.   SKIN: warm periphery, no rashes      Medical Decision Making       45 MINUTES SPENT BY ME on the date of service doing chart review, history, exam, documentation & further activities per the note.      Data     I have personally reviewed the following data over the past 24 hrs:    5.3  \   8.7 (L)   / 116 (L)     143 101 8.3 /  117 (H)   4.7 30 (H) 0.63 \       Arterial Blood Gases:    Recent Labs   Lab 02/22/24  0822 02/21/24  0753 02/20/24  1054 02/18/24  1913   PH 7.35 7.33* 7.40  --    PCO2 56* 69* 65*  --    PO2 60* 300* 70*  --    HCO3 31* 36* 39*  --    O2PER 32 48 32 95

## 2024-02-22 NOTE — PROGRESS NOTES
Currently low 90s on 4L Oxymask, patient uses 3L NC chronically.  Breath sounds coarse, scatter expiratory wheezes.  Productive loose cough.  1 PRN neb given this shift.

## 2024-02-22 NOTE — PLAN OF CARE
PT/OT consult received. Deferred eval at this time due to sedation and ability to participate. Will evaluate once appropriate.

## 2024-02-23 ENCOUNTER — APPOINTMENT (OUTPATIENT)
Dept: PHYSICAL THERAPY | Facility: OTHER | Age: 74
DRG: 178 | End: 2024-02-23
Payer: MEDICARE

## 2024-02-23 ENCOUNTER — APPOINTMENT (OUTPATIENT)
Dept: OCCUPATIONAL THERAPY | Facility: OTHER | Age: 74
DRG: 178 | End: 2024-02-23
Attending: INTERNAL MEDICINE
Payer: MEDICARE

## 2024-02-23 LAB
ALLEN'S TEST: NO
ANION GAP SERPL CALCULATED.3IONS-SCNC: 9 MMOL/L (ref 7–15)
BASE EXCESS BLDA CALC-SCNC: 8.3 MMOL/L (ref -3–3)
BUN SERPL-MCNC: 8.9 MG/DL (ref 8–23)
CALCIUM SERPL-MCNC: 9.4 MG/DL (ref 8.8–10.2)
CHLORIDE SERPL-SCNC: 99 MMOL/L (ref 98–107)
COHGB MFR BLD: 98.6 % (ref 95–96)
CREAT SERPL-MCNC: 0.59 MG/DL (ref 0.51–0.95)
DEPRECATED HCO3 PLAS-SCNC: 31 MMOL/L (ref 22–29)
EGFRCR SERPLBLD CKD-EPI 2021: >90 ML/MIN/1.73M2
ERYTHROCYTE [DISTWIDTH] IN BLOOD BY AUTOMATED COUNT: 17.4 % (ref 10–15)
GLUCOSE SERPL-MCNC: 110 MG/DL (ref 70–99)
HCO3 BLD-SCNC: 35 MMOL/L (ref 21–28)
HCT VFR BLD AUTO: 24.6 % (ref 35–47)
HGB BLD-MCNC: 7.5 G/DL (ref 11.7–15.7)
MAGNESIUM SERPL-MCNC: 1.7 MG/DL (ref 1.7–2.3)
MCH RBC QN AUTO: 29.5 PG (ref 26.5–33)
MCHC RBC AUTO-ENTMCNC: 30.5 G/DL (ref 31.5–36.5)
MCV RBC AUTO: 97 FL (ref 78–100)
O2/TOTAL GAS SETTING VFR VENT: 28 %
PCO2 BLD: 56 MM HG (ref 35–45)
PH BLD: 7.4 [PH] (ref 7.35–7.45)
PLATELET # BLD AUTO: 110 10E3/UL (ref 150–450)
PO2 BLD: 94 MM HG (ref 80–105)
POTASSIUM SERPL-SCNC: 3.7 MMOL/L (ref 3.4–5.3)
RBC # BLD AUTO: 2.54 10E6/UL (ref 3.8–5.2)
SAO2 % BLDA: 96 % (ref 92–100)
SODIUM SERPL-SCNC: 139 MMOL/L (ref 135–145)
WBC # BLD AUTO: 4.1 10E3/UL (ref 4–11)

## 2024-02-23 PROCEDURE — 97162 PT EVAL MOD COMPLEX 30 MIN: CPT | Mod: GP

## 2024-02-23 PROCEDURE — 250N000013 HC RX MED GY IP 250 OP 250 PS 637: Performed by: INTERNAL MEDICINE

## 2024-02-23 PROCEDURE — 120N000001 HC R&B MED SURG/OB

## 2024-02-23 PROCEDURE — 36600 WITHDRAWAL OF ARTERIAL BLOOD: CPT | Performed by: INTERNAL MEDICINE

## 2024-02-23 PROCEDURE — 250N000011 HC RX IP 250 OP 636: Performed by: INTERNAL MEDICINE

## 2024-02-23 PROCEDURE — 97535 SELF CARE MNGMENT TRAINING: CPT | Mod: GO | Performed by: OCCUPATIONAL THERAPIST

## 2024-02-23 PROCEDURE — C9113 INJ PANTOPRAZOLE SODIUM, VIA: HCPCS | Performed by: INTERNAL MEDICINE

## 2024-02-23 PROCEDURE — 85027 COMPLETE CBC AUTOMATED: CPT | Performed by: INTERNAL MEDICINE

## 2024-02-23 PROCEDURE — 83735 ASSAY OF MAGNESIUM: CPT | Performed by: INTERNAL MEDICINE

## 2024-02-23 PROCEDURE — 97165 OT EVAL LOW COMPLEX 30 MIN: CPT | Mod: GO | Performed by: OCCUPATIONAL THERAPIST

## 2024-02-23 PROCEDURE — 999N000157 HC STATISTIC RCP TIME EA 10 MIN

## 2024-02-23 PROCEDURE — 82805 BLOOD GASES W/O2 SATURATION: CPT | Performed by: INTERNAL MEDICINE

## 2024-02-23 PROCEDURE — 99233 SBSQ HOSP IP/OBS HIGH 50: CPT | Performed by: INTERNAL MEDICINE

## 2024-02-23 PROCEDURE — 97530 THERAPEUTIC ACTIVITIES: CPT | Mod: GO | Performed by: OCCUPATIONAL THERAPIST

## 2024-02-23 PROCEDURE — 94640 AIRWAY INHALATION TREATMENT: CPT

## 2024-02-23 PROCEDURE — 80048 BASIC METABOLIC PNL TOTAL CA: CPT | Performed by: INTERNAL MEDICINE

## 2024-02-23 PROCEDURE — 250N000009 HC RX 250: Performed by: INTERNAL MEDICINE

## 2024-02-23 PROCEDURE — 97530 THERAPEUTIC ACTIVITIES: CPT | Mod: GP

## 2024-02-23 RX ORDER — FUROSEMIDE 10 MG/ML
40 INJECTION INTRAMUSCULAR; INTRAVENOUS ONCE
Qty: 4 ML | Refills: 0 | Status: COMPLETED | OUTPATIENT
Start: 2024-02-23 | End: 2024-02-23

## 2024-02-23 RX ADMIN — IPRATROPIUM BROMIDE AND ALBUTEROL SULFATE 3 ML: .5; 3 SOLUTION RESPIRATORY (INHALATION) at 10:03

## 2024-02-23 RX ADMIN — FOLIC ACID 1 MG: 1 TABLET ORAL at 09:09

## 2024-02-23 RX ADMIN — PREGABALIN 50 MG: 25 CAPSULE ORAL at 22:13

## 2024-02-23 RX ADMIN — POTASSIUM CHLORIDE 10 MEQ: 750 TABLET, EXTENDED RELEASE ORAL at 09:13

## 2024-02-23 RX ADMIN — TRAZODONE HYDROCHLORIDE 50 MG: 50 TABLET ORAL at 23:04

## 2024-02-23 RX ADMIN — PREGABALIN 50 MG: 25 CAPSULE ORAL at 14:35

## 2024-02-23 RX ADMIN — MULTIPLE VITAMINS W/ MINERALS TAB 1 TABLET: TAB at 09:14

## 2024-02-23 RX ADMIN — LISINOPRIL 10 MG: 10 TABLET ORAL at 09:10

## 2024-02-23 RX ADMIN — PANTOPRAZOLE SODIUM 40 MG: 40 INJECTION, POWDER, FOR SOLUTION INTRAVENOUS at 22:08

## 2024-02-23 RX ADMIN — AMPICILLIN SODIUM AND SULBACTAM SODIUM 3 G: 2; 1 INJECTION, POWDER, FOR SOLUTION INTRAMUSCULAR; INTRAVENOUS at 11:22

## 2024-02-23 RX ADMIN — ALLOPURINOL 100 MG: 100 TABLET ORAL at 22:14

## 2024-02-23 RX ADMIN — ATORVASTATIN CALCIUM 80 MG: 40 TABLET, FILM COATED ORAL at 09:13

## 2024-02-23 RX ADMIN — CLONIDINE HYDROCHLORIDE 0.1 MG: 0.1 TABLET ORAL at 14:35

## 2024-02-23 RX ADMIN — NYSTATIN 15 G: 100000 POWDER TOPICAL at 22:07

## 2024-02-23 RX ADMIN — GUAIFENESIN 600 MG: 600 TABLET, EXTENDED RELEASE ORAL at 22:14

## 2024-02-23 RX ADMIN — AMPICILLIN SODIUM AND SULBACTAM SODIUM 3 G: 2; 1 INJECTION, POWDER, FOR SOLUTION INTRAMUSCULAR; INTRAVENOUS at 17:45

## 2024-02-23 RX ADMIN — THIAMINE HCL TAB 100 MG 100 MG: 100 TAB at 09:14

## 2024-02-23 RX ADMIN — CLONIDINE HYDROCHLORIDE 0.1 MG: 0.1 TABLET ORAL at 22:14

## 2024-02-23 RX ADMIN — NYSTATIN: 100000 POWDER TOPICAL at 09:15

## 2024-02-23 RX ADMIN — LEVOTHYROXINE SODIUM 50 MCG: 0.03 TABLET ORAL at 09:10

## 2024-02-23 RX ADMIN — GUAIFENESIN 600 MG: 600 TABLET, EXTENDED RELEASE ORAL at 09:12

## 2024-02-23 RX ADMIN — FERROUS SULFATE TAB EC 325 MG (65 MG FE EQUIVALENT) 325 MG: 325 (65 FE) TABLET DELAYED RESPONSE at 22:13

## 2024-02-23 RX ADMIN — SERTRALINE HYDROCHLORIDE 100 MG: 50 TABLET ORAL at 09:09

## 2024-02-23 RX ADMIN — FUROSEMIDE 40 MG: 10 INJECTION, SOLUTION INTRAMUSCULAR; INTRAVENOUS at 11:22

## 2024-02-23 RX ADMIN — METOPROLOL SUCCINATE 25 MG: 25 TABLET, EXTENDED RELEASE ORAL at 09:10

## 2024-02-23 RX ADMIN — AMPICILLIN SODIUM AND SULBACTAM SODIUM 3 G: 2; 1 INJECTION, POWDER, FOR SOLUTION INTRAMUSCULAR; INTRAVENOUS at 05:53

## 2024-02-23 RX ADMIN — POTASSIUM CHLORIDE 10 MEQ: 750 TABLET, EXTENDED RELEASE ORAL at 22:13

## 2024-02-23 RX ADMIN — AMPICILLIN SODIUM AND SULBACTAM SODIUM 3 G: 2; 1 INJECTION, POWDER, FOR SOLUTION INTRAMUSCULAR; INTRAVENOUS at 00:07

## 2024-02-23 RX ADMIN — ALLOPURINOL 100 MG: 100 TABLET ORAL at 09:11

## 2024-02-23 RX ADMIN — NICOTINE POLACRILEX 2 MG: 2 GUM, CHEWING BUCCAL at 20:51

## 2024-02-23 RX ADMIN — PANTOPRAZOLE SODIUM 40 MG: 40 INJECTION, POWDER, FOR SOLUTION INTRAVENOUS at 09:06

## 2024-02-23 ASSESSMENT — ACTIVITIES OF DAILY LIVING (ADL)
ADLS_ACUITY_SCORE: 44
ADLS_ACUITY_SCORE: 44
ADLS_ACUITY_SCORE: 43
ADLS_ACUITY_SCORE: 43
ADLS_ACUITY_SCORE: 44
ADLS_ACUITY_SCORE: 43
ADLS_ACUITY_SCORE: 39
ADLS_ACUITY_SCORE: 44
ADLS_ACUITY_SCORE: 39
ADLS_ACUITY_SCORE: 44
ADLS_ACUITY_SCORE: 39
ADLS_ACUITY_SCORE: 39
ADLS_ACUITY_SCORE: 44
ADLS_ACUITY_SCORE: 43
ADLS_ACUITY_SCORE: 39
ADLS_ACUITY_SCORE: 44
ADLS_ACUITY_SCORE: 39
ADLS_ACUITY_SCORE: 44
ADLS_ACUITY_SCORE: 43
ADLS_ACUITY_SCORE: 43
ADLS_ACUITY_SCORE: 39

## 2024-02-23 NOTE — PLAN OF CARE
Assumed care of pt at 1140. Report received from Soheila CAM.       SAFETY CHECKLIST  ID Bands and Risk clasps correct and in place (DNR, Fall risk, Allergy, Latex, Limb):  Yes  All Lines Reconciled and labeled correctly: Yes  Whiteboard updated:Yes  Environmental interventions: Yes  Verify Tele #: discontinued

## 2024-02-23 NOTE — PROGRESS NOTES
"   02/23/24 1234   Appointment Info   Signing Clinician's Name / Credentials (OT) Vikki Villafana, OTR/L   Living Environment   People in Home alone   Current Living Arrangements house   Home Accessibility   (pt reports she has a ramp to entrance and no steps inside)   Transportation Anticipated family or friend will provide   Living Environment Comments pts sister lives with here; pt has had previous [facility] and was noncompliant so not able to return to [facility] home[facility] following discharge   Self-Care   Usual Activity Tolerance fair   Current Activity Tolerance poor   Equipment Currently Used at Home walker, rolling   Fall history within last six months yes   Number of times patient has fallen within last six months 2   General Information   Referring Physician Janna   Patient/Family Therapy Goal Statement (OT) pt states \"I am just so weak\"   Additional Occupational Profile Info/Pertinent History of Current Problem chronic 3 lpm oxygen at baseline   Existing Precautions/Restrictions fall   Cognitive Status Examination   Orientation Status person;place   Affect/Mental Status (Cognitive) low arousal/lethargic   Pain Assessment   Patient Currently in Pain No   Range of Motion Comprehensive   General Range of Motion no range of motion deficits identified   Bed Mobility   Bed Mobility supine-sit;sit-supine   Supine-Sit [place] (Bed Mobility) minimum assist (75% patient effort)   Sit-Supine [place] (Bed Mobility) minimum assist (75% patient effort)   Transfers   Transfers sit-stand transfer;toilet transfer   Sit-Stand Transfer   Sit-Stand [place] (Transfers) minimum assist (75% patient effort)   Assistive Device (Sit-Stand Transfers) walker, front-wheeled   Toilet Transfer   Type (Toilet Transfer) sit-stand   [place] Level (Toilet Transfer) moderate assist (50% patient effort)   Toilet Transfer Comments needed more assist sit to stand from lower toilet seat   Clinical Impression   Criteria " for Skilled Therapeutic Interventions Met (OT) Yes, treatment indicated   OT Diagnosis aspiration pneumonitis   Assessment of Occupational Performance 1-3 Performance Deficits   Identified Performance Deficits self cares, mobility   Planned Therapy Interventions (OT) ADL retraining;bed mobility training;progressive activity/exercise   Clinical Decision Making Complexity (OT) problem focused assessment/low complexity   Risk & Benefits of therapy have been explained evaluation/treatment results reviewed   Clinical Impression Comments pt very deconditioned and weak, poor tolerance for activity.  Upon arrival, o2 sats at 79% on 2.5 lpm, needed to increased 5 lpm for ambulation to and from bathroom for toileting   OT Total Evaluation Time   OT Eval, Low Complexity Minutes (13848) 15   OT Goals   Therapy Frequency (OT) Daily   OT Predicted Duration/Target Date for Goal Attainment 02/27/24   OT Goals Lower Body Dressing;Toilet Transfer/Toileting;Bed Mobility   OT: Lower Body Dressing Minimal assist   OT: Bed Mobility Minimal assist   OT: Toilet Transfer/Toileting Minimal assist   Interventions   Interventions Quick Adds Therapeutic Activity;Self-Care/Home Management   Self-Care/Home Management   Self-Care/Home Mgmt/ADL, Compensatory, Meal Prep Minutes (67186) 15   Symptoms Noted During/After Treatment (Meal Preparation/Planning Training) shortness of breath   Treatment Detail/Skilled Intervention pt agreeable to getting up to bathroom with therapy; ambulated to and from bathroom with FWW and CGA  x 2 people to manage IV and poles.   [place] Level (Toilet Training) moderate assist (50% patient effort)   Assistance (Toilet Training) 1 person + 1 person to manage equipment   Therapeutic Activities   Therapeutic Activity Minutes (46828) 15   Symptoms noted during/after treatment shortness of breath   Treatment Detail/Skilled Intervention bed mobility mod assist x 2 sit <>supine, CGA/min assist sit to stand from bed,  needed mod assist to stand from lower toilet seat.  Pt very fatigued, leaning on walker while sitting on toilet, and needed o2 increased to 5 lpm to maintain sats above 90% .  Oxygen returned to 3 lpm when back in bed at end of session   OT Discharge Planning   OT Plan Continue OT   OT Discharge Recommendation (DC Rec) Transitional Care Facility   OT Rationale for DC Rec pt very deconditioned and would benefit from short term rehab prior to return home due to poor tolerance for activity   OT Brief overview of current status pt requires assist with all self cares and mobility tasks, mod assist needed to stand from lower toilet seat and pt desatting with minimal activity.  Mod assist overall for self cares completion.

## 2024-02-23 NOTE — PROGRESS NOTES
[facility]    Medicine Progress Note - Hospitalist Service    Date of Admission:  2/18/2024    Assessment & Plan   Principal Problem:      Alcohol withdrawal (H)    Assessment: alcohol dependence. Elevated CIWA scores, on and off precedex. Should be emerging from withdrawal symptoms now.    Plan: CIWA protocol, wean precedex as able      Acute respiratory failure with hypoxia    Assessment: present on admission, secondary to aspiration pneumonia and COPD. Requiring supplemental oxygen to maintain sats over 90%.     Plan: treat pneumonia, wean oxygen as needed. Monitor for symptoms of hypercapnia.       Aspiration pneumonitis (H)    Assessment: present on admission, secondary to alcohol intoxication.     Plan: Unasyn day 5, given sedation will continue IV antibiotics       Alcoholic intoxication without complication (H24)    Assessment: present on admission. BAL 0.12 on admit.     Plan: monitor, CIWA    Active Problems:    CAD (coronary artery disease)    Assessment: present on admission         Alcohol abuse      Chronic obstructive pulmonary disease with acute exacerbation (H)    Assessment: present on admission     Plan: monitor      Hypomagnesemia    Assessment: present on admission     Plan: replace      Acute on chronic anemia  Assessment: typically runs around 8.0 hemoglobin. Presumed blood loss related as well as alcohol related pancytopenia. Low iron levels. Hgb stable  Plan: continue iron, recheck in AM    Abdomen pain  Assessment: distended. CT reviewed from Jan 2024 shows no ascites. Xray today shows no free air or air fluid levels. Suspect constipation. Improving.  Plan: suppository and fleets. Ambulate.    Chronic systolic heart failure   Assessment: no obvious heart failure symptoms  Plan: monitor          Diet: Combination Diet Regular Diet Adult, Regular Diet; 2 gm NA Diet    DVT Prophylaxis: Pneumatic Compression Devices  Pérez Catheter: Not present  Lines: None     Cardiac  Monitoring: ACTIVE order. Indication: Acute decompensated heart failure (48 hours)  Code Status: Full Code      Clinically Significant Risk Factors                # Thrombocytopenia: Lowest platelets = 110 in last 2 days, will monitor for bleeding                     Disposition Plan    2-3 days         John Saldivar MD  Hospitalist Service  [facility]  Securely message with [employer] (more info)  Text page via [employer] Paging/Directory   ______________________________________________________________________    Interval History   More awake. Complains of bilateral lower extremity pain. No dyspnea. No nausea, vomiting.     Physical Exam   Vital Signs: Temp: 97  F (36.1  C) Temp src: Temporal BP: 106/50 Pulse: 78   Resp: 13 SpO2: 99 % O2 Device: Nasal cannula Oxygen Delivery: 3 LPM  Weight: 196 lbs 3.35 oz    GENERAL: Comfortable, no apparent distress.  CARDIOVASCULAR: regular rate and rhythm, no murmur. No lower extremity edema   RESPIRATORY: Clear to auscultation bilaterally, no wheezes or crackles.  GI: non-tender, non-distended, normal bowel sounds.   SKIN: warm periphery, no rashes      Medical Decision Making       50 MINUTES SPENT BY ME on the date of service doing chart review, history, exam, documentation & further activities per the note.      Data     I have personally reviewed the following data over the past 24 hrs:    4.1  \   7.5 (L)   / 110 (L)     139 99 8.9 /  110 (H)   3.7 31 (H) 0.59 \       Imaging results reviewed over the past 24 hrs:   No results found for this or any previous visit (from the past 24 hour(s)).

## 2024-02-23 NOTE — PROGRESS NOTES
:     Spoke with patients son, Deric, who states that he is concerned about patient returning home without short term rehab. Deric stated that patients sister was previously living with and caring for patient but she is no longer going to be. Deric stated that patient will have support from himself and his wife when she returns home but he feels that she will need short term rehab to regain her strength.     Evelyne Boothe, OSMEL on 2/23/2024 at 2:04 PM

## 2024-02-23 NOTE — PLAN OF CARE
Goal Outcome Evaluation:       Improving. CIWA score of 3 during this morning's assessment. Oriented, but slow to respond. Plan to transfer to medical floor today.

## 2024-02-23 NOTE — CARE PLAN
Patient's mentation has improved much throughout the shift. CIWA scores have been 3-4 for last few hours. She is still pretty lethargic despite only 2 mg of ativan given today.

## 2024-02-23 NOTE — PROGRESS NOTES
Interdisciplinary Discharge Planning Note    Anticipated Discharge Date:2/22-2/26    Anticipated Discharge Location: Home     Clinical Needs Before Discharge:  stable vital signs and CIWA protocol, antibiotics     Treatment Needs After Discharge:  hospice and rehab (PT, OT, ST)    Potential Barriers to Discharge: None Identified     OSMEL Cantu  2/23/2024,  12:49 PM

## 2024-02-23 NOTE — PLAN OF CARE
Problem: COPD (Chronic Obstructive Pulmonary Disease)  Goal: Effective Oxygenation and Ventilation  Outcome: Progressing  Intervention: Promote Airway Secretion Clearance  Recent Flowsheet Documentation  Taken 2/23/2024 0400 by Teri Daley RN  Activity Management: activity adjusted per tolerance  Taken 2/23/2024 0000 by Teri Daley RN  Activity Management: up to bedside commode  Taken 2/22/2024 2000 by Teri Daley RN  Cough And Deep Breathing: done with encouragement  Activity Management: activity adjusted per tolerance  Intervention: Optimize Oxygenation and Ventilation  Recent Flowsheet Documentation  Taken 2/23/2024 0000 by Teri Daley RN  Head of Bed (HOB) Positioning: HOB at 20-30 degrees  Taken 2/22/2024 2000 by Teri Daley RN  Head of Bed (HOB) Positioning: HOB at 30 degrees     Problem: Excessive Substance Use  Goal: Improved Physiologic Symptoms (Excessive Substance Use)  Outcome: Progressing     Problem: Pneumonia  Goal: Effective Oxygenation and Ventilation  Outcome: Progressing  Intervention: Promote Airway Secretion Clearance  Recent Flowsheet Documentation  Taken 2/22/2024 2000 by Teri Daley RN  Cough And Deep Breathing: done with encouragement  Intervention: Optimize Oxygenation and Ventilation  Recent Flowsheet Documentation  Taken 2/23/2024 0000 by Teri Daley RN  Head of Bed (HOB) Positioning: HOB at 20-30 degrees  Taken 2/22/2024 2000 by Teri Daley RN  Head of Bed (HOB) Positioning: HOB at 30 degrees   Goal Outcome Evaluation:    Pt weaned down to home dose oxygen at 3 liters/minute and O2 saturations reading 97% at rest. Breathing appears non labored, no tachypnea noted even with activity when she got up to bedside commode. Breath sounds clear anteriorly and diminished posteriorly, intermittently coarse. Harsh, congested, and productive cough. Pt using oral suction independently.   Low scores on CIWA overnight, no  interventions warranted.   Pt disoriented to situation. Forgetful to place. ?if she's through withdrawal and maybe has some sundowning since she seems to be clear during the day then in the evening has become more forgetful the past few days.

## 2024-02-23 NOTE — PROGRESS NOTES
02/23/24 1100   Appointment Info   Signing Clinician's Name / Credentials (PT) Chioma Huerta, PT, DPT   Living Environment   People in Home alone   Current Living Arrangements house   Home Accessibility no concerns   Transportation Anticipated family or friend will provide   Living Environment Comments Patient reports that she lives in a house. Ramp to enter. Patient reports a walk-in shower with shower chair. Patient states she is independent with ADLs and IADLs. She uses a 4WW at home.   Self-Care   Usual Activity Tolerance fair   Current Activity Tolerance poor   Regular Exercise No   Equipment Currently Used at Home walker, rolling   Fall history within last six months yes   Number of times patient has fallen within last six months 2   General Information   Onset of Illness/Injury or Date of Surgery 02/18/24   Pertinent History of Current Problem (include personal factors and/or comorbidities that impact the POC) Patient was admitted for alcohol intoxication and CHF.   Cognition   Affect/Mental Status (Cognition) low arousal/lethargic   Orientation Status (Cognition) oriented x 3   Follows Commands (Cognition) follows two-step commands;delayed response/completion   Pain Assessment   Patient Currently in Pain No   Integumentary/Edema   Integumentary/Edema other (describe)   Integumentary/Edema Comments little scabs scattered over arms and legs   Posture    Posture Forward head position;Protracted shoulders   Range of Motion (ROM)   Range of Motion ROM is WFL   Strength (Manual Muscle Testing)   Strength (Manual Muscle Testing) strength is WFL   Strength Comments decreased hip flexor strength   Bed Mobility   Bed Mobility supine-sit;sit-supine   Supine-Sit [place] (Bed Mobility) contact guard;1 person assist   Sit-Supine [place] (Bed Mobility) moderate assist (50% patient effort);2 person assist   Impairments Contributing to Impaired Bed Mobility decreased strength   Transfers   Transfers  sit-stand transfer;toilet transfer   Maintains Weight-bearing Status (Transfers) able to maintain   Transfer Safety Concerns Noted decreased step length;decreased weight-shifting ability   Impairments Contributing to Impaired Transfers impaired balance;decreased strength;other (see comments)  (decreased cognitive processing)   Sit-Stand Transfer   Sit-Stand [place] (Transfers) contact guard   Assistive Device (Sit-Stand Transfers) walker, front-wheeled   Toilet Transfer   [place] Level (Toilet Transfer) moderate assist (50% patient effort);1 person assist   Assistive Device (Toilet Transfer) walker, front-wheeled   Type (Toilet Transfer) sit-stand;stand-sit   Gait/Stairs (Locomotion)   [place] Level (Gait) contact guard;1 person assist;1 person to manage equipment   Assistive Device (Gait) walker, front-wheeled   Distance in Feet (Gait) 15   Pattern (Gait) step-through   Deviations/Abnormal Patterns (Gait) jaiden decreased;gait speed decreased;stride length decreased;weight shifting decreased   Maintains Weight-bearing Status (Gait) able to maintain   Comment, (Gait/Stairs) Pushes walker in front of her.   Clinical Impression   Criteria for Skilled Therapeutic Intervention Yes, treatment indicated   PT Diagnosis (PT) impaired mobility   Influenced by the following impairments decreased strength, decreased cognitive processing   Functional limitations due to impairments decreased activity tolerance   Clinical Presentation (PT Evaluation Complexity) stable   Clinical Decision Making (Complexity) moderate complexity   Planned Therapy Interventions (PT) balance training;bed mobility training;gait training;home exercise program;strengthening;transfer training   Risk & Benefits of therapy have been explained evaluation/treatment results reviewed;care plan/treatment goals reviewed;risks/benefits reviewed;participants included;patient   PT Total Evaluation Time   PT [place], Moderate Complexity Minutes (70429)  30   Physical Therapy Goals   PT Frequency Daily   PT Predicted Duration/Target Date for Goal Attainment 02/26/24   PT Goals Bed Mobility;Transfers;Gait   PT: Bed Mobility Independent;Supine to/from sit   PT: Transfers Independent;Sit to/from stand;Bed to/from chair   PT: Gait Independent;Greater than 200 feet   Interventions   Interventions Quick Adds Therapeutic Activity   Therapeutic Activity   Therapeutic Activities: dynamic activities to improve functional performance Minutes (14289) 15   Symptoms Noted During/After Treatment Dizziness;Fatigue;Shortness of breath   Treatment Detail/Skilled Intervention Patient was lying in bed upon PT entering the room. Patient is agreeable to therapy. Patient currently reports no pain just difficulty with breathing. Patient was on 2.5L O2 via NC when we entered [facility] room and was satin at 77%. O2 bumped up to 4L in order to get O2 to get to 93-95%. Patient was CGA in order to complete supine to sit. Patient had to sit EOB for 3 minutes due to dizziness. Gait belt ans  socks were donned in sitting. Patient was CGA in order to stand using FWW. Patient ambulated from EOB to the bathroom. Patient required min assist in order to complete sit to supine. Patient reported being short of breath once sitting. O2 was checked and patient was sating at 78%. O2 was bumped to 5L. Patient was min assist in order to complete self cares. Patient was mod assist in order stand from toilet with FWW. Patient was CGA in order to ambulate from toilet to bed. Patient was mod assist x2 in order to complete sit to supine. Patient tolerated therapy session fair. Patient would feel dizzy and SOB when O2 would drop. Patient tolerated minimal activity. Patient ambulates with a bradykinetic gait speed. Patient demonstrates a short shuffled step length. Patien would push walker out in front of her and required verbal and tactile cuing to keep it closer. Patient hunches over when ambulating. Patient would  benefit from continued skilled physical therapy in order to improve strength, balance and endurance to facilitate return to PLOF. Patient was lying in bed with call light in reach and bed alarm on upon PT exiting the room.   PT Discharge Planning   PT Plan Continue PT   PT Discharge Recommendation (DC Rec) Transitional Care Facility   PT Rationale for DC Rec Patient would benefit from continued skilled physical therapy in order to improve strength, balance and endurance to facilitate return to PLOF.   PT Brief overview of current status Patient was nice. Patient slow to process commands. Patient required CGA-mod assist for transfers. Patient only able to ambulate a short distance before fatiguing.   Total Session Time   Timed Code Treatment Minutes 15   Total Session Time (sum of timed and untimed services) 45

## 2024-02-23 NOTE — PHARMACY-CONSULT NOTE
Pharmacy - Transfer Medication Reconciliation     The patient's transfer medication orders have been compared to the medication administration record and to the Prior to Admissions Medications list - any noted discrepancies were resolved with the MD. Patient from ICU to [facility].    Thank you. Pharmacy will continue to monitor.     Rosita Guillen [facility] ....................  2/23/2024   11:08 AM

## 2024-02-24 ENCOUNTER — APPOINTMENT (OUTPATIENT)
Dept: PHYSICAL THERAPY | Facility: OTHER | Age: 74
DRG: 178 | End: 2024-02-24
Payer: MEDICARE

## 2024-02-24 ENCOUNTER — APPOINTMENT (OUTPATIENT)
Dept: OCCUPATIONAL THERAPY | Facility: OTHER | Age: 74
DRG: 178 | End: 2024-02-24
Payer: MEDICARE

## 2024-02-24 LAB
ANION GAP SERPL CALCULATED.3IONS-SCNC: 9 MMOL/L (ref 7–15)
BUN SERPL-MCNC: 9.1 MG/DL (ref 8–23)
CALCIUM SERPL-MCNC: 9.4 MG/DL (ref 8.8–10.2)
CHLORIDE SERPL-SCNC: 101 MMOL/L (ref 98–107)
CREAT SERPL-MCNC: 0.63 MG/DL (ref 0.51–0.95)
DEPRECATED HCO3 PLAS-SCNC: 31 MMOL/L (ref 22–29)
EGFRCR SERPLBLD CKD-EPI 2021: >90 ML/MIN/1.73M2
ERYTHROCYTE [DISTWIDTH] IN BLOOD BY AUTOMATED COUNT: 17.9 % (ref 10–15)
GLUCOSE SERPL-MCNC: 112 MG/DL (ref 70–99)
HCT VFR BLD AUTO: 25.6 % (ref 35–47)
HGB BLD-MCNC: 7.8 G/DL (ref 11.7–15.7)
MAGNESIUM SERPL-MCNC: 1.8 MG/DL (ref 1.7–2.3)
MCH RBC QN AUTO: 29.8 PG (ref 26.5–33)
MCHC RBC AUTO-ENTMCNC: 30.5 G/DL (ref 31.5–36.5)
MCV RBC AUTO: 98 FL (ref 78–100)
PLATELET # BLD AUTO: 119 10E3/UL (ref 150–450)
POTASSIUM SERPL-SCNC: 3.6 MMOL/L (ref 3.4–5.3)
RBC # BLD AUTO: 2.62 10E6/UL (ref 3.8–5.2)
SODIUM SERPL-SCNC: 141 MMOL/L (ref 135–145)
WBC # BLD AUTO: 4.3 10E3/UL (ref 4–11)

## 2024-02-24 PROCEDURE — 97530 THERAPEUTIC ACTIVITIES: CPT | Mod: GP

## 2024-02-24 PROCEDURE — 85027 COMPLETE CBC AUTOMATED: CPT | Performed by: INTERNAL MEDICINE

## 2024-02-24 PROCEDURE — C9113 INJ PANTOPRAZOLE SODIUM, VIA: HCPCS | Performed by: INTERNAL MEDICINE

## 2024-02-24 PROCEDURE — 250N000013 HC RX MED GY IP 250 OP 250 PS 637: Performed by: INTERNAL MEDICINE

## 2024-02-24 PROCEDURE — 99232 SBSQ HOSP IP/OBS MODERATE 35: CPT | Performed by: INTERNAL MEDICINE

## 2024-02-24 PROCEDURE — 80048 BASIC METABOLIC PNL TOTAL CA: CPT | Performed by: INTERNAL MEDICINE

## 2024-02-24 PROCEDURE — 36415 COLL VENOUS BLD VENIPUNCTURE: CPT | Performed by: INTERNAL MEDICINE

## 2024-02-24 PROCEDURE — 97116 GAIT TRAINING THERAPY: CPT | Mod: GP

## 2024-02-24 PROCEDURE — 120N000001 HC R&B MED SURG/OB

## 2024-02-24 PROCEDURE — 83735 ASSAY OF MAGNESIUM: CPT | Performed by: INTERNAL MEDICINE

## 2024-02-24 PROCEDURE — 250N000011 HC RX IP 250 OP 636: Performed by: INTERNAL MEDICINE

## 2024-02-24 PROCEDURE — 97535 SELF CARE MNGMENT TRAINING: CPT | Mod: GO | Performed by: OCCUPATIONAL THERAPIST

## 2024-02-24 RX ORDER — CEFUROXIME AXETIL 250 MG/1
500 TABLET ORAL EVERY 12 HOURS SCHEDULED
Status: DISCONTINUED | OUTPATIENT
Start: 2024-02-24 | End: 2024-02-25 | Stop reason: HOSPADM

## 2024-02-24 RX ADMIN — POTASSIUM CHLORIDE 10 MEQ: 750 TABLET, EXTENDED RELEASE ORAL at 21:48

## 2024-02-24 RX ADMIN — LISINOPRIL 10 MG: 10 TABLET ORAL at 09:03

## 2024-02-24 RX ADMIN — CEFUROXIME AXETIL 500 MG: 250 TABLET ORAL at 10:24

## 2024-02-24 RX ADMIN — HYDROXYZINE PAMOATE 25 MG: 25 CAPSULE ORAL at 19:33

## 2024-02-24 RX ADMIN — PREGABALIN 50 MG: 25 CAPSULE ORAL at 14:55

## 2024-02-24 RX ADMIN — ALLOPURINOL 100 MG: 100 TABLET ORAL at 21:48

## 2024-02-24 RX ADMIN — SERTRALINE HYDROCHLORIDE 100 MG: 50 TABLET ORAL at 09:02

## 2024-02-24 RX ADMIN — NICOTINE POLACRILEX 2 MG: 2 GUM, CHEWING BUCCAL at 19:36

## 2024-02-24 RX ADMIN — PREGABALIN 50 MG: 25 CAPSULE ORAL at 21:48

## 2024-02-24 RX ADMIN — CLONIDINE HYDROCHLORIDE 0.1 MG: 0.1 TABLET ORAL at 05:23

## 2024-02-24 RX ADMIN — AMPICILLIN SODIUM AND SULBACTAM SODIUM 3 G: 2; 1 INJECTION, POWDER, FOR SOLUTION INTRAMUSCULAR; INTRAVENOUS at 05:22

## 2024-02-24 RX ADMIN — PANTOPRAZOLE SODIUM 40 MG: 40 INJECTION, POWDER, FOR SOLUTION INTRAVENOUS at 09:03

## 2024-02-24 RX ADMIN — OXYCODONE HYDROCHLORIDE 2.5 MG: 5 TABLET ORAL at 00:33

## 2024-02-24 RX ADMIN — CLONIDINE HYDROCHLORIDE 0.1 MG: 0.1 TABLET ORAL at 14:55

## 2024-02-24 RX ADMIN — ATORVASTATIN CALCIUM 80 MG: 40 TABLET, FILM COATED ORAL at 09:02

## 2024-02-24 RX ADMIN — FOLIC ACID 1 MG: 1 TABLET ORAL at 09:02

## 2024-02-24 RX ADMIN — NICOTINE POLACRILEX 2 MG: 2 GUM, CHEWING BUCCAL at 00:30

## 2024-02-24 RX ADMIN — LEVOTHYROXINE SODIUM 50 MCG: 0.03 TABLET ORAL at 09:02

## 2024-02-24 RX ADMIN — GUAIFENESIN 600 MG: 600 TABLET, EXTENDED RELEASE ORAL at 21:48

## 2024-02-24 RX ADMIN — NYSTATIN: 100000 POWDER TOPICAL at 21:55

## 2024-02-24 RX ADMIN — ONDANSETRON 4 MG: 4 TABLET, ORALLY DISINTEGRATING ORAL at 12:29

## 2024-02-24 RX ADMIN — Medication 6 MG: at 00:33

## 2024-02-24 RX ADMIN — PANTOPRAZOLE SODIUM 40 MG: 40 INJECTION, POWDER, FOR SOLUTION INTRAVENOUS at 21:46

## 2024-02-24 RX ADMIN — PREGABALIN 50 MG: 25 CAPSULE ORAL at 05:23

## 2024-02-24 RX ADMIN — OXYCODONE HYDROCHLORIDE 2.5 MG: 5 TABLET ORAL at 10:28

## 2024-02-24 RX ADMIN — NYSTATIN: 100000 POWDER TOPICAL at 09:09

## 2024-02-24 RX ADMIN — FERROUS SULFATE TAB EC 325 MG (65 MG FE EQUIVALENT) 325 MG: 325 (65 FE) TABLET DELAYED RESPONSE at 21:48

## 2024-02-24 RX ADMIN — MULTIPLE VITAMINS W/ MINERALS TAB 1 TABLET: TAB at 09:03

## 2024-02-24 RX ADMIN — POTASSIUM CHLORIDE 10 MEQ: 750 TABLET, EXTENDED RELEASE ORAL at 09:02

## 2024-02-24 RX ADMIN — ALLOPURINOL 100 MG: 100 TABLET ORAL at 09:02

## 2024-02-24 RX ADMIN — GUAIFENESIN 600 MG: 600 TABLET, EXTENDED RELEASE ORAL at 09:02

## 2024-02-24 RX ADMIN — CEFUROXIME AXETIL 500 MG: 250 TABLET ORAL at 19:33

## 2024-02-24 RX ADMIN — FERROUS SULFATE TAB EC 325 MG (65 MG FE EQUIVALENT) 325 MG: 325 (65 FE) TABLET DELAYED RESPONSE at 09:02

## 2024-02-24 RX ADMIN — METOPROLOL SUCCINATE 25 MG: 25 TABLET, EXTENDED RELEASE ORAL at 09:02

## 2024-02-24 RX ADMIN — AMPICILLIN SODIUM AND SULBACTAM SODIUM 3 G: 2; 1 INJECTION, POWDER, FOR SOLUTION INTRAMUSCULAR; INTRAVENOUS at 00:17

## 2024-02-24 ASSESSMENT — ACTIVITIES OF DAILY LIVING (ADL)
ADLS_ACUITY_SCORE: 45
ADLS_ACUITY_SCORE: 44
ADLS_ACUITY_SCORE: 45
ADLS_ACUITY_SCORE: 44
ADLS_ACUITY_SCORE: 45
ADLS_ACUITY_SCORE: 44
ADLS_ACUITY_SCORE: 48
ADLS_ACUITY_SCORE: 44
ADLS_ACUITY_SCORE: 45
ADLS_ACUITY_SCORE: 44
ADLS_ACUITY_SCORE: 44
ADLS_ACUITY_SCORE: 48
ADLS_ACUITY_SCORE: 44
ADLS_ACUITY_SCORE: 45
ADLS_ACUITY_SCORE: 44
ADLS_ACUITY_SCORE: 48

## 2024-02-24 NOTE — PLAN OF CARE
Goal Outcome Evaluation:      Plan of Care Reviewed With: patient    Overall Patient Progress: improving     VSS. A/O to self, place and situation this shift. Slightly drowsy still, however, easy to rouse. Up with assist of one this evening, level of assist varies, did require assist of 2 earlier today. Incontinent of large amount of urine. Up in chair at this time. Encouraged use of IS, able to get to 750. LS clear but diminished. Productive loose cough. O2 via NC at 3 LPM, sats 92%. Continues to receive IV antibiotics. CIWA score 3, no intervention needed. Continue to encourage OOB activity.

## 2024-02-24 NOTE — PROGRESS NOTES
[facility]    Medicine Progress Note - Hospitalist Service    Date of Admission:  2/18/2024    Assessment & Plan   Principal Problem:      Alcohol withdrawal (H)    Assessment: alcohol dependence. Elevated CIWA scores, on and off precedex. She is emerging from withdrawal symptoms now.    Plan: CIWA protocol   Increase activity      Acute respiratory failure with hypoxia    Assessment: present on admission, secondary to aspiration pneumonia and COPD. Chronic 3 liters of oxygen.     Plan: treat pneumonia, wean oxygen as needed. Monitor for symptoms of hypercapnia.       Aspiration pneumonitis (H)    Assessment: present on admission, secondary to alcohol intoxication.     Plan: Unasyn day 5, will switch to augmentin day 1      Alcoholic intoxication without complication (H24)    Assessment: present on admission. BAL 0.12 on admit.     Plan: monitor, CIWA    Active Problems:    CAD (coronary artery disease)    Assessment: present on admission         Alcohol abuse      Chronic obstructive pulmonary disease with acute exacerbation (H)    Assessment: present on admission     Plan: monitor      Hypomagnesemia    Assessment: present on admission     Plan: replace      Acute on chronic anemia  Assessment: typically runs around 8.0 hemoglobin. Presumed blood loss related as well as alcohol related pancytopenia. Low iron levels. Hgb stable  Plan: continue iron, recheck in AM    Abdomen pain  Assessment: distended. CT reviewed from Jan 2024 shows no ascites. Xray today shows no free air or air fluid levels. Suspect constipation. Improving.  Plan: suppository and fleets. Ambulate.    Chronic systolic heart failure   Assessment: no obvious heart failure symptoms  Plan: monitor          Diet: Combination Diet Regular Diet Adult, Regular Diet; 2 gm NA Diet    DVT Prophylaxis: Pneumatic Compression Devices  Pérez Catheter: Not present  Lines: None     Cardiac Monitoring: None  Code Status: Full Code       Clinically Significant Risk Factors                # Thrombocytopenia: Lowest platelets = 110 in last 2 days, will monitor for bleeding                     Disposition Plan    tomorrow         John Saldivar MD  Hospitalist Service  [facility]  Securely message with [employer] (more info)  Text page via "[employer] Paging/Directory   ______________________________________________________________________    Interval History   Feeling better. Wants to get up and shower, walk.     Physical Exam   Vital Signs: Temp: 97.5  F (36.4  C) Temp src: Tympanic BP: 123/51 Pulse: 73   Resp: 18 SpO2: 92 % O2 Device: Nasal cannula Oxygen Delivery: 3 LPM  Weight: 204 lbs 0 oz    GENERAL: Comfortable, no apparent distress.  CARDIOVASCULAR: regular rate and rhythm, no murmur. No lower extremity edema   RESPIRATORY: Clear to auscultation bilaterally, no wheezes or crackles.  GI: non-tender, non-distended, normal bowel sounds.   SKIN: warm periphery, no rashes      Medical Decision Making       45 MINUTES SPENT BY ME on the date of service doing chart review, history, exam, documentation & further activities per the note.      Data     I have personally reviewed the following data over the past 24 hrs:    4.3  \   7.8 (L)   / 119 (L)     141 101 9.1 /  112 (H)   3.6 31 (H) 0.63 \

## 2024-02-24 NOTE — PROGRESS NOTES
02/24/24 1206   Appointment Info   Signing Clinician's Name / Credentials (OT) Blanca Barton OTR/L   Self-Care/Home Management   Self-Care/Home Mgmt/ADL, Compensatory, Meal Prep Minutes (39340) 55   Symptoms Noted During/After Treatment (Meal Preparation/Planning Training) dizziness;fatigue   [place] Level (Grooming Training) maximum assist (25% patient effort)   [place] Level (Bathing Training) maximum assist (25% patient effort)  (with all tasks due to fatigue and dizziness)   [place] Level (Upper Body Dressing Training) minimum assist (75% patient effort)   Assistance (Upper Body Dressing Training) 1 person assist   Lower Body Dressing Training Assistance maximum assist (25% patient effort)   OT Discharge Planning   OT Plan continue to progress functional mobility and activity tolerance/ADl's   OT Discharge Recommendation (DC Rec) Transitional Care Facility   OT Rationale for DC Rec pt very deconditioned and would benefit from short term rehab prior to return home due to poor tolerance for activity   OT Brief overview of current status Pt progressing slowly, limited by fatigue and some dizziness today, but took full shower with max assist overall due to these issues. Pt was able to wash face and chest/etc, very appreciative of shower and felt better after   Total Session Time   Timed Code Treatment Minutes 55   Total Session Time (sum of timed and untimed services) 55

## 2024-02-24 NOTE — PROGRESS NOTES
02/24/24 1100   Appointment Info   Signing Clinician's Name / Credentials (PT) Kelsey Chavez DPT   Physical Therapy Goals   PT Frequency Daily   PT Predicted Duration/Target Date for Goal Attainment 02/26/24   PT Goals Bed Mobility;Transfers;Gait   PT: Bed Mobility Independent;Supine to/from sit   PT: Transfers Independent;Sit to/from stand;Bed to/from chair   PT: Gait Independent;Greater than 200 feet   Interventions   Interventions Quick Adds Therapeutic Activity;Gait Training   Therapeutic Activity   Therapeutic Activities: dynamic activities to improve functional performance Minutes (68792) 10   Treatment Detail/Skilled Intervention transfers needing min A, mod A to sit EOB when she first sat down from walk and light headed.   Gait Training   Gait Training Minutes (13695) 20   Symptoms Noted During/After Treatment (Gait Training) dizziness   Treatment Detail/Skilled Intervention Walked with FWW and CGA to door from chair and got by bed (40 ft total) and had to sit because light headed. After sitting break still wanted to shower, ambuated into shower with FWW and CGA. After shower ambuated back to bed (another 20 ft total)   Distance in Feet 60   [place] Level (Gait Training) contact guard   Physical Assistance Level (Gait Training) 1 person + 1 person to manage equipment   Weight Bearing (Gait Training) full weight-bearing   Assistive Device (Gait Training) rolling walker   Pattern Analysis (Gait Training) swing-to gait   Gait Analysis Deviations decreased jaiden;decreased step length   Impairments (Gait Analysis/Training) balance impaired;strength decreased   PT Discharge Planning   PT Plan Continue PT   PT Discharge Recommendation (DC Rec) Transitional Care Facility   PT Rationale for DC Rec Patient would benefit from continued skilled physical therapy in order to improve strength, balance and endurance to facilitate return to PLOF.   PT Brief overview of current status Asiya was tired but ok to get  up and walk and shower with therapy today. Walked with FWW and CGA to door from chair and got by bed (40 ft total) and had to sit because light headed. After sitting break still wanted to shower, ambuated into shower with FWW and CGA. After shower ambuated back to bed and needed Sangeetha to get into bed and a scoot up Ax2.   Total Session Time   Timed Code Treatment Minutes 30   Total Session Time (sum of timed and untimed services) 30

## 2024-02-24 NOTE — PLAN OF CARE
Disorientated to time but alert to self. Patient's orientation seems to vary. VSS. LS diminished. Chronic 3 L of oxygen via NC. CIWA q4h, maintained a score under 7 throughout the shift. Redness in right groin, nystatin powder applied. Incontinent of bowel and bladder, purwick in place. Adequate output. CCR q2h. Assist of 1-2 with walker.     Goal Outcome Evaluation:      Plan of Care Reviewed With: patient    Overall Patient Progress: improvingOverall Patient Progress: improving

## 2024-02-25 ENCOUNTER — APPOINTMENT (OUTPATIENT)
Dept: OCCUPATIONAL THERAPY | Facility: OTHER | Age: 74
DRG: 178 | End: 2024-02-25
Payer: MEDICARE

## 2024-02-25 ENCOUNTER — APPOINTMENT (OUTPATIENT)
Dept: PHYSICAL THERAPY | Facility: OTHER | Age: 74
DRG: 178 | End: 2024-02-25
Payer: MEDICARE

## 2024-02-25 VITALS
DIASTOLIC BLOOD PRESSURE: 41 MMHG | TEMPERATURE: 98 F | SYSTOLIC BLOOD PRESSURE: 96 MMHG | BODY MASS INDEX: 36.14 KG/M2 | OXYGEN SATURATION: 90 % | HEART RATE: 70 BPM | WEIGHT: 204 LBS | RESPIRATION RATE: 18 BRPM

## 2024-02-25 LAB
HOLD SPECIMEN: NORMAL
MAGNESIUM SERPL-MCNC: 1.7 MG/DL (ref 1.7–2.3)
POTASSIUM SERPL-SCNC: 4.3 MMOL/L (ref 3.4–5.3)

## 2024-02-25 PROCEDURE — 97530 THERAPEUTIC ACTIVITIES: CPT | Mod: GP

## 2024-02-25 PROCEDURE — 250N000013 HC RX MED GY IP 250 OP 250 PS 637: Performed by: INTERNAL MEDICINE

## 2024-02-25 PROCEDURE — 84132 ASSAY OF SERUM POTASSIUM: CPT | Performed by: INTERNAL MEDICINE

## 2024-02-25 PROCEDURE — 97530 THERAPEUTIC ACTIVITIES: CPT | Mod: GO | Performed by: OCCUPATIONAL THERAPIST

## 2024-02-25 PROCEDURE — 83735 ASSAY OF MAGNESIUM: CPT | Performed by: INTERNAL MEDICINE

## 2024-02-25 PROCEDURE — 99239 HOSP IP/OBS DSCHRG MGMT >30: CPT | Performed by: INTERNAL MEDICINE

## 2024-02-25 PROCEDURE — 36415 COLL VENOUS BLD VENIPUNCTURE: CPT | Performed by: INTERNAL MEDICINE

## 2024-02-25 PROCEDURE — 97116 GAIT TRAINING THERAPY: CPT | Mod: GP

## 2024-02-25 RX ORDER — CEFUROXIME AXETIL 500 MG/1
500 TABLET ORAL EVERY 12 HOURS
Qty: 6 TABLET | Refills: 0 | Status: SHIPPED | OUTPATIENT
Start: 2024-02-25 | End: 2024-02-28

## 2024-02-25 RX ADMIN — ALLOPURINOL 100 MG: 100 TABLET ORAL at 10:17

## 2024-02-25 RX ADMIN — CLONIDINE HYDROCHLORIDE 0.1 MG: 0.1 TABLET ORAL at 05:44

## 2024-02-25 RX ADMIN — POTASSIUM CHLORIDE 10 MEQ: 750 TABLET, EXTENDED RELEASE ORAL at 10:17

## 2024-02-25 RX ADMIN — PREGABALIN 50 MG: 25 CAPSULE ORAL at 05:44

## 2024-02-25 RX ADMIN — SERTRALINE HYDROCHLORIDE 100 MG: 50 TABLET ORAL at 10:17

## 2024-02-25 RX ADMIN — NYSTATIN: 100000 POWDER TOPICAL at 10:17

## 2024-02-25 RX ADMIN — CEFUROXIME AXETIL 500 MG: 250 TABLET ORAL at 08:21

## 2024-02-25 RX ADMIN — FOLIC ACID 1 MG: 1 TABLET ORAL at 10:17

## 2024-02-25 RX ADMIN — GUAIFENESIN 600 MG: 600 TABLET, EXTENDED RELEASE ORAL at 10:17

## 2024-02-25 RX ADMIN — FERROUS SULFATE TAB EC 325 MG (65 MG FE EQUIVALENT) 325 MG: 325 (65 FE) TABLET DELAYED RESPONSE at 10:17

## 2024-02-25 RX ADMIN — LEVOTHYROXINE SODIUM 50 MCG: 0.03 TABLET ORAL at 10:17

## 2024-02-25 RX ADMIN — MULTIPLE VITAMINS W/ MINERALS TAB 1 TABLET: TAB at 10:17

## 2024-02-25 RX ADMIN — ATORVASTATIN CALCIUM 80 MG: 40 TABLET, FILM COATED ORAL at 10:17

## 2024-02-25 RX ADMIN — NICOTINE POLACRILEX 2 MG: 2 GUM, CHEWING BUCCAL at 01:06

## 2024-02-25 ASSESSMENT — ACTIVITIES OF DAILY LIVING (ADL)
ADLS_ACUITY_SCORE: 43
ADLS_ACUITY_SCORE: 45
ADLS_ACUITY_SCORE: 43
ADLS_ACUITY_SCORE: 45
ADLS_ACUITY_SCORE: 43
ADLS_ACUITY_SCORE: 45
ADLS_ACUITY_SCORE: 45

## 2024-02-25 NOTE — PLAN OF CARE
Patient is disorientated to time. Alert to self and situation. VSS. BP was running soft overnight. Tele doc aware, no orders given. LS have been diminished but clear. Infrequent cough that is loose and productive. 3L of O2 via NC, chronic. Redness in right groin, nystatin powder applied. SBA in room with belt and walker.     Goal Outcome Evaluation:      Plan of Care Reviewed With: patient    Overall Patient Progress: no changeOverall Patient Progress: no change

## 2024-02-25 NOTE — DISCHARGE SUMMARY
"[facility]  Hospitalist Discharge Summary      Date of Admission:  2/18/2024  Date of Discharge:  2/25/2024  Discharging Provider: John Saldivar MD  Discharge Service: Hospitalist Service    Discharge Diagnoses   Principal Problem:    Aspiration pneumonitis (H)  Active Problems:    CAD (coronary artery disease)    Alcohol withdrawal (H)    Alcohol abuse    Chronic obstructive pulmonary disease with acute exacerbation (H)    Acute on chronic congestive heart failure, unspecified heart failure type (H)    Hypomagnesemia    Alcoholic intoxication without complication (H24)       Clinically Significant Risk Factors          Follow-ups Needed After Discharge   Follow-up Appointments     Follow-up and recommended labs and tests       Follow up on 3/6/2024 at 11:00 AM with Helga Jacinto at [facility]            Discharge Disposition   Discharged to home with home care referral  Condition at discharge: Stable    Hospital Course     Per the H&P:  \"Natalee Montalvo is a 73 year old female who presented to the ED with complaint of shortness of breath.  She usually is on 3L O2 at home for COPD but noticed that she had been having worsening shortness of breath with activity which progressed to shortness of breath at rest.  She does also complain of edema, but this has been chronic and present for the last 3 years.     In the ED, she was noted to be hypoxic, intoxicated, and found to have a RLL infiltrate.  The patient tells me that she has 4 drinks of rum and water daily at home. \"    Principal Problem:    Alcohol withdrawal (H)    Assessment: alcohol dependence. Elevated CIWA scores, on and off precedex. She is through her withdrawal symptoms       Acute respiratory failure with hypoxia    Assessment: present on admission, secondary to aspiration pneumonia and COPD. Chronic 3 liters of oxygen.       Aspiration pneumonitis (H)    Assessment: present on admission, secondary to alcohol intoxication.     " Plan: Unasyn day 5, will switch to ceftin day 2      Alcoholic intoxication without complication (H24)    Assessment: present on admission. BAL 0.12 on admit.     Plan: monitor, CIWA    Active Problems:    CAD (coronary artery disease)    Assessment: present on admission         Alcohol abuse      Chronic obstructive pulmonary disease with acute exacerbation (H)    Assessment: present on admission     Plan: monitor      Hypomagnesemia    Assessment: present on admission     Plan: replace      Acute on chronic anemia  Assessment: typically runs around 8.0 hemoglobin. Presumed blood loss related as well as alcohol related pancytopenia. Low iron levels. Hgb stable  Plan: continue iron,    Abdomen pain  Assessment: distended. CT reviewed from Jan 2024 shows no ascites. Xray today shows no free air or air fluid levels. Suspect constipation. Improving.    Chronic systolic heart failure   Assessment: no obvious heart failure symptoms  Plan: monitor    Consultations This Hospital Stay   CARDIOLOGY IP CONSULT  SOCIAL WORK IP CONSULT  PHYSICAL THERAPY ADULT IP CONSULT  OCCUPATIONAL THERAPY ADULT IP CONSULT    Code Status   Full Code    Time Spent on this Encounter   I, John Saldivar MD, personally saw the patient today and spent greater than 30 minutes discharging this patient.       John Saldivar MD  [facility]  [address]  [place] [address]  Phone: [phone]  Fax: [phone]  ______________________________________________________________________    Physical Exam   Vital Signs: Temp: 98  F (36.7  C) Temp src: Tympanic BP: 96/41 Pulse: 70   Resp: 18 SpO2: 90 % O2 Device: Nasal cannula Oxygen Delivery: 3 LPM  Weight: 204 lbs 0 oz  GENERAL: Comfortable, no apparent distress.  CARDIOVASCULAR: regular rate and rhythm, no murmur. No lower extremity edema   RESPIRATORY: Clear to auscultation bilaterally, no wheezes or crackles.  GI: non-tender, non-distended, normal bowel sounds.    SKIN: warm periphery, no rashes         Primary Care Physician   Helga Jacinto    Discharge Orders      Home Care Referral      Reason for your hospital stay    Pneumonia and alcohol withdrawal     Follow-up and recommended labs and tests     Follow up on 3/6/2024 at 11:00 AM with Helga Jacinto at [facility]    Your activity upon discharge: activity as tolerated     Oxygen Adult/Peds    Oxygen Documentation  I certify that this patient, Natalee Montalvo has been under my care (or a nurse practitioner or physician's assistant working with me). This is the face-to-face encounter for oxygen medical necessity.      At the time of this encounter, I have reviewed the qualifying testing and have determined that supplemental oxygen is reasonable and necessary and is expected to improve the patient's condition in a home setting.       Patient has continued oxygen desaturation due to COPD J44.9.    If portability is ordered, is the patient mobile within the home? yes     Diet    Follow this diet upon discharge: Orders Placed This Encounter      Combination Diet Regular Diet Adult, Regular Diet; 2 gm NA Diet       Significant Results and Procedures   Most Recent 3 CBC's:  Recent Labs   Lab Test 02/24/24  0549 02/23/24  0602 02/22/24  0532   WBC 4.3 4.1 5.3   HGB 7.8* 7.5* 8.7*   MCV 98 97 99   * 110* 116*     Most Recent 3 BMP's:  Recent Labs   Lab Test 02/25/24  0531 02/24/24  0549 02/23/24  0602 02/22/24  1158 02/22/24  0532   NA  --  141 139  --  143   POTASSIUM 4.3 3.6 3.7  --  4.7   CHLORIDE  --  101 99  --  101   CO2  --  31* 31*  --  30*   BUN  --  9.1 8.9  --  8.3   CR  --  0.63 0.59  --  0.63   ANIONGAP  --  9 9  --  12   FELICIANO  --  9.4 9.4  --  9.9   GLC  --  112* 110* 116* 117*     Most Recent 2 LFT's:  Recent Labs   Lab Test 02/19/24  0606 02/18/24  1901   AST 32 34   ALT 25 27   ALKPHOS 93 91   BILITOTAL 0.3 <0.2   ,   Results for orders placed or performed during the hospital encounter of  02/18/24   XR Chest Port 1 View    Narrative    PROCEDURE INFORMATION:   Exam: XR Chest   Exam date and time: 2/18/2024 7:14 PM   Age: 73 years old   Clinical indication: Other: SOB, wheeze, weight gain     TECHNIQUE:   Imaging protocol: Radiologic exam of the chest.   Views: 1 view.     COMPARISON:   CT CHEST/ABDOMEN/PELVIS W CONTRAST 1/17/2024 11:27 PM     FINDINGS:   Lungs: Patchy density in the medial right lower lobe.   Pleural spaces: Unremarkable. No pleural effusion. No pneumothorax.   Heart/Mediastinum: Mild cardiomegaly.   Bones/joints: Prior median sternotomy. Prior right AC separation and distal   clavicular resection. Moderate left acromioclavicular joint degenerative   disease. Moderate right glenohumeral joint degenerative spurring. Healed   posterior right 6th, 7th and 8th rib fractures. No acute fracture.       Impression    IMPRESSION:   Density in the medial right lower lobe, concerning for pneumonia. Consider CT   for further evaluation.     THIS DOCUMENT HAS BEEN ELECTRONICALLY SIGNED BY FRANCIS CONCEPCION MD   XR Abdomen Port 2 Views    Narrative    PROCEDURE:  XR ABDOMEN PORT 2 VIEWS    HISTORY: abd distension    TECHNIQUE:  AP upright and supine radiographs of the abdomen.    COMPARISON:  CT chest abdomen pelvis 1/17/2024    FINDINGS:     Bibasilar atelectasis. No free intra-abdominal air.     No dilated loops of small bowel or air-fluid levels are seen.     No suspicious osseous lesions. Right total hip arthroplasty. Urinary  catheter in place.      Impression    IMPRESSION:    No obstruction or free air.    MONICA LAUREN MD         SYSTEM ID:  [id number]       Discharge Medications   Current Discharge Medication List        START taking these medications    Details   cefuroxime (CEFTIN) 500 MG tablet Take 1 tablet (500 mg) by mouth every 12 hours for 3 days  Qty: 6 tablet, Refills: 0    Associated Diagnoses: Aspiration pneumonia of right lower lobe, unspecified aspiration pneumonia type (H)            CONTINUE these medications which have NOT CHANGED    Details   allopurinol (ZYLOPRIM) 100 MG tablet Take 100 mg by mouth 2 times daily      aspirin 81 MG EC tablet Take 81 mg by mouth daily      atorvastatin (LIPITOR) 80 MG tablet Take 80 mg by mouth daily       folic acid (FOLVITE) 1 MG tablet Take 1 tablet (1 mg) by mouth daily  Qty: 100 tablet, Refills: 0    Associated Diagnoses: Chronic obstructive pulmonary disease with acute exacerbation (H)      hydrOXYzine bari (VISTARIL) 25 MG capsule Take 25 mg by mouth daily as needed for anxiety      ipratropium - albuterol 0.5 mg/2.5 mg/3 mL (DUONEB) 0.5-2.5 (3) MG/3ML neb solution Take 1 vial (3 mLs) by nebulization every 4 hours as needed for shortness of breath or wheezing  Qty: 90 mL, Refills: 11    Associated Diagnoses: Chronic obstructive pulmonary disease with acute exacerbation (H)      levothyroxine (SYNTHROID/LEVOTHROID) 50 MCG tablet Take 50 mcg by mouth daily      lisinopril (ZESTRIL) 10 MG tablet Take 10 mg by mouth daily      metoprolol succinate ER (TOPROL XL) 25 MG 24 hr tablet Take 25 mg by mouth daily      Multiple Vitamins-Minerals (WOMENS MULTIVITAMIN) TABS Take 1 tablet by mouth daily      nitroGLYcerin (NITROSTAT) 0.4 MG sublingual tablet Place 0.4 mg under the tongue every 5 minutes as needed for chest pain For chest pain place 1 tablet under the tongue every 5 minutes for 3 doses. If symptoms persist 5 minutes after 1st dose call 911.      pantoprazole (PROTONIX) 40 MG EC tablet Take 40 mg by mouth 2 times daily      potassium chloride ER (KLOR-CON M) 10 MEQ CR tablet Take 10 mEq by mouth 2 times daily      prasugrel (EFFIENT) 10 MG TABS tablet Take 10 mg by mouth daily      predniSONE (DELTASONE) 5 MG tablet Take 7.5 mg by mouth daily X3 more days, then decrease to 7.5 mg daily until bottle runs out (taking 7.5 mg right now)      pregabalin (LYRICA) 50 MG capsule Take 50 mg by mouth 3 times daily      sertraline (ZOLOFT) 100 MG tablet Take  100 mg by mouth daily      thiamine (B-1) 100 MG tablet Take 100 mg by mouth Every Mon, Wed, Fri Morning      torsemide (DEMADEX) 20 MG tablet Take 20 mg by mouth 2 times daily      ondansetron (ZOFRAN) 4 MG tablet Take 4 mg by mouth every 12 hours as needed for nausea or vomiting      order for DME Equipment being ordered: Walker Wheels ()  Treatment Diagnosis: weakness. COPD  Qty: 1 each, Refills: 0    Associated Diagnoses: Chronic obstructive pulmonary disease with acute exacerbation (H)      traZODone (DESYREL) 50 MG tablet Take 50 mg by mouth nightly as needed for sleep           Allergies   Allergies   Allergen Reactions    Amoxicillin-Pot Clavulanate      Other reaction(s): GI intolerance  Stomach cramps    Bupropion      Other reaction(s): Other  Insomnia and tension    Nicotine Rash     After open heart surgery; back broke out in severe rash; has not retrialed    Oxycodone Nausea    Pseudoephedrine      Other reaction(s): Increased blood pressure, Tachycardia  Advise not to use this medication which was in her allergy product.

## 2024-02-25 NOTE — PHARMACY - DISCHARGE MEDICATION RECONCILIATION AND EDUCATION
"Pharmacy:  Discharge Counseling and Medication Reconciliation    Natalee Montalvo  [address]  [facility] [address]  [phone] (home)   73 year old female  PCP: Helga Jacinto    Allergies: Amoxicillin-pot clavulanate, Bupropion, Nicotine, Oxycodone, and Pseudoephedrine    Discharge Counseling:    Pharmacist met with patient today to review the medication portion of the After Visit Summary (with an emphasis on NEW medications) and to address patient's questions/concerns.    Summary of Education: Counseled patient on new medication of Cefuroxime, including indication, administration, and potential adverse reactions. Advised patient to take medication with food to minimize stomach upset. Also advised potential for loose stool.    Patient reports she does have an adequate supply of nebulizer treatments at home, says \"I need to get better about using them.\"      Materials Provided:  MedCounselor sheets printed from Clinical Pharmacology on: Cefuroxime    Discharge Medication Reconciliation:    It has been determined that the patient has an adequate supply of medications available or which can be obtained from the patient's preferred pharmacy, which she has confirmed as: [facility]- [place].    Thank you for the consult.    Casey Pacheco [facility]........February 25, 2024 10:45 AM      "
Not applicable: This is a surgical and/or non-medical patient

## 2024-02-25 NOTE — PLAN OF CARE
Discharge Note      Data:  Natalee Montalvo discharged to home at 1340 via wheel chair. Accompanied by staff.    Action:  Written discharge/follow-up instructions were provided to patient and son. Prescriptions sent to patients preferred pharmacy. All belongings sent with patient.    Response:  Natalee verbalized understanding of discharge instructions, reason for discharge, and necessary follow-up appointments.

## 2024-02-25 NOTE — PLAN OF CARE
Goal Outcome Evaluation:      Plan of Care Reviewed With: patient    Overall Patient Progress: improving    A/O to self, place and situation.  Confused with date by only one day. Still sleepy off and on.  Up with assist of one, gaitbelt and walker.  Up to shower, chair and ambulated in room X3 today. C/O occasional neck and low back pain, received Oxycodone X1 and ice to neck with relief.  C/O nausea before lunch and received Zofran with relief. CIWA scores of 1 today. Continues to have BRUNER/SOB, productive cough and exp wheezes heard this AM. Antibiotics changed to po today. IV SL.

## 2024-02-25 NOTE — PROGRESS NOTES
SAFETY CHECKLIST  ID Bands and Risk clasps correct and in place (DNR, Fall risk, Allergy, Latex, Limb):  Yes  All Lines Reconciled and labeled correctly: Yes  Whiteboard updated:Yes  Environmental interventions: Yes    Verify Tele #:

## 2024-02-25 NOTE — PROGRESS NOTES
02/25/24 1122   Appointment Info   Signing Clinician's Name / Credentials (OT) Blanca Barton, OTR/L   Therapeutic Activities   Therapeutic Activity Minutes (37328) 25   Symptoms noted during/after treatment dizziness;fatigue   Treatment Detail/Skilled Intervention Pt ambulated in room and greater distance in hallway today with use of 4WW and CGA for safety only on her basline 3 liters of O2. Pt did report slight dizziness and needed 1 rest break while walking in santoro, but tolerated this much better vs yesterday.   OT Discharge Planning   OT Plan d/c home today with family assist and home care therapies   OT Discharge Recommendation (DC Rec) home with assist;home with home care occupational therapy   OT Rationale for DC Rec Pt has refused STR, is agreeable to going home with family assist as needed and home care therapies to continue to address strength and activity tolerance.   OT Brief overview of current status see above for details, pt able to tolerate increased activity today, is on her baseline level of O2, feels ready to D/C home   OT Equipment Needed at Discharge   (pt has all necessary equipment)   Total Session Time   Timed Code Treatment Minutes 25   Total Session Time (sum of timed and untimed services) 25

## 2024-02-26 ENCOUNTER — PATIENT OUTREACH (OUTPATIENT)
Dept: FAMILY MEDICINE | Facility: OTHER | Age: 74
End: 2024-02-26
Payer: COMMERCIAL

## 2024-02-26 NOTE — PROGRESS NOTES
:     Accessed patients chart to determine her discharge plan. Patient has a home care referral placed. Unsure whether or not this was set up. Per PT/OT, patient is agreeable to home care services.     Pt/family was given the Medicare Compare list for Home Care, with associated star ratings to assist with choice for referrals/discharge planning Yes    Education was given to pt/family that star ratings are updated/maintained by Medicare and can be reviewed by visiting www.medicare.gov Yes    [facility] cannot accept patient back at this time.     Spoke with [facility] and they stated they can accept patient for home care services.     OSMEL Cantu on 2/26/2024 at 10:34 AM

## 2024-02-26 NOTE — TELEPHONE ENCOUNTER
Patient has PCP elsewhere, no follow-up here. No TCM call required per policy.  Marcy Patel RN on 2/26/2024 at 8:07 AM

## 2024-05-04 ENCOUNTER — HEALTH MAINTENANCE LETTER (OUTPATIENT)
Age: 74
End: 2024-05-04

## 2025-05-17 ENCOUNTER — HEALTH MAINTENANCE LETTER (OUTPATIENT)
Age: 75
End: 2025-05-17

## (undated) RX ORDER — MAGNESIUM SULFATE 1 G/100ML
INJECTION INTRAVENOUS
Status: DISPENSED
Start: 2024-02-18

## (undated) RX ORDER — FENTANYL CITRATE 50 UG/ML
INJECTION, SOLUTION INTRAMUSCULAR; INTRAVENOUS
Status: DISPENSED
Start: 2024-01-18

## (undated) RX ORDER — FOLIC ACID 1 MG/1
TABLET ORAL
Status: DISPENSED
Start: 2024-02-18

## (undated) RX ORDER — CLONIDINE HYDROCHLORIDE 0.1 MG/1
TABLET ORAL
Status: DISPENSED
Start: 2024-02-18

## (undated) RX ORDER — DEXAMETHASONE SODIUM PHOSPHATE 10 MG/ML
INJECTION, SOLUTION INTRAMUSCULAR; INTRAVENOUS
Status: DISPENSED
Start: 2023-10-26

## (undated) RX ORDER — MULTIPLE VITAMINS W/ MINERALS TAB 9MG-400MCG
TAB ORAL
Status: DISPENSED
Start: 2024-02-18

## (undated) RX ORDER — POTASSIUM CHLORIDE 20MEQ/15ML
LIQUID (ML) ORAL
Status: DISPENSED
Start: 2019-04-01

## (undated) RX ORDER — LIDOCAINE HYDROCHLORIDE 10 MG/ML
INJECTION, SOLUTION EPIDURAL; INFILTRATION; INTRACAUDAL; PERINEURAL
Status: DISPENSED
Start: 2024-01-17

## (undated) RX ORDER — AMPICILLIN AND SULBACTAM 2; 1 G/1; G/1
INJECTION, POWDER, FOR SOLUTION INTRAMUSCULAR; INTRAVENOUS
Status: DISPENSED
Start: 2024-02-18

## (undated) RX ORDER — HYDROMORPHONE HYDROCHLORIDE 1 MG/ML
INJECTION, SOLUTION INTRAMUSCULAR; INTRAVENOUS; SUBCUTANEOUS
Status: DISPENSED
Start: 2023-10-26

## (undated) RX ORDER — LIDOCAINE HYDROCHLORIDE AND EPINEPHRINE 10; 10 MG/ML; UG/ML
INJECTION, SOLUTION INFILTRATION; PERINEURAL
Status: DISPENSED
Start: 2023-06-17

## (undated) RX ORDER — SODIUM CHLORIDE 9 MG/ML
INJECTION, SOLUTION INTRAVENOUS
Status: DISPENSED
Start: 2019-04-01

## (undated) RX ORDER — MIDODRINE HYDROCHLORIDE 10 MG/1
TABLET ORAL
Status: DISPENSED
Start: 2024-01-18

## (undated) RX ORDER — FUROSEMIDE 10 MG/ML
INJECTION INTRAMUSCULAR; INTRAVENOUS
Status: DISPENSED
Start: 2024-02-18

## (undated) RX ORDER — PANTOPRAZOLE SODIUM 40 MG/10ML
INJECTION, POWDER, LYOPHILIZED, FOR SOLUTION INTRAVENOUS
Status: DISPENSED
Start: 2024-01-18

## (undated) RX ORDER — DOPAMINE HYDROCHLORIDE 160 MG/100ML
INJECTION, SOLUTION INTRAVENOUS
Status: DISPENSED
Start: 2019-04-01

## (undated) RX ORDER — PANTOPRAZOLE SODIUM 40 MG/10ML
INJECTION, POWDER, LYOPHILIZED, FOR SOLUTION INTRAVENOUS
Status: DISPENSED
Start: 2024-01-17

## (undated) RX ORDER — IPRATROPIUM BROMIDE AND ALBUTEROL SULFATE 2.5; .5 MG/3ML; MG/3ML
SOLUTION RESPIRATORY (INHALATION)
Status: DISPENSED
Start: 2024-02-18

## (undated) RX ORDER — FENTANYL CITRATE 50 UG/ML
INJECTION, SOLUTION INTRAMUSCULAR; INTRAVENOUS
Status: DISPENSED
Start: 2024-01-17

## (undated) RX ORDER — PHYTONADIONE 10 MG/ML
INJECTION, EMULSION INTRAMUSCULAR; INTRAVENOUS; SUBCUTANEOUS
Status: DISPENSED
Start: 2024-01-17

## (undated) RX ORDER — FENTANYL CITRATE 50 UG/ML
INJECTION, SOLUTION INTRAMUSCULAR; INTRAVENOUS
Status: DISPENSED
Start: 2023-06-17

## (undated) RX ORDER — HYDROMORPHONE HYDROCHLORIDE 1 MG/ML
INJECTION, SOLUTION INTRAMUSCULAR; INTRAVENOUS; SUBCUTANEOUS
Status: DISPENSED
Start: 2023-06-18